# Patient Record
Sex: MALE | Race: WHITE | NOT HISPANIC OR LATINO | Employment: FULL TIME | URBAN - NONMETROPOLITAN AREA
[De-identification: names, ages, dates, MRNs, and addresses within clinical notes are randomized per-mention and may not be internally consistent; named-entity substitution may affect disease eponyms.]

---

## 2017-12-05 ENCOUNTER — HOSPITAL ENCOUNTER (OUTPATIENT)
Facility: HOSPITAL | Age: 42
Setting detail: OBSERVATION
Discharge: HOME | End: 2017-12-07
Attending: EMERGENCY MEDICINE | Admitting: INTERNAL MEDICINE
Payer: COMMERCIAL

## 2017-12-05 ENCOUNTER — APPOINTMENT (OUTPATIENT)
Dept: CT IMAGING | Facility: HOSPITAL | Age: 42
End: 2017-12-05
Payer: COMMERCIAL

## 2017-12-05 ENCOUNTER — APPOINTMENT (EMERGENCY)
Dept: RADIOLOGY | Facility: HOSPITAL | Age: 42
End: 2017-12-05
Payer: COMMERCIAL

## 2017-12-05 ENCOUNTER — APPOINTMENT (EMERGENCY)
Dept: CT IMAGING | Facility: HOSPITAL | Age: 42
End: 2017-12-05
Payer: COMMERCIAL

## 2017-12-05 DIAGNOSIS — R09.02 HYPOXIA: ICD-10-CM

## 2017-12-05 DIAGNOSIS — R41.0 CONFUSION: ICD-10-CM

## 2017-12-05 DIAGNOSIS — R09.02 HYPOXEMIA: ICD-10-CM

## 2017-12-05 DIAGNOSIS — G93.40 ACUTE ENCEPHALOPATHY: ICD-10-CM

## 2017-12-05 DIAGNOSIS — R41.82 ALTERED MENTAL STATUS: Primary | ICD-10-CM

## 2017-12-05 DIAGNOSIS — R41.0 DISORIENTATION: ICD-10-CM

## 2017-12-05 PROBLEM — G89.29 CHRONIC BACK PAIN: Status: ACTIVE | Noted: 2017-12-05

## 2017-12-05 PROBLEM — Z72.0 TOBACCO ABUSE: Status: ACTIVE | Noted: 2017-12-05

## 2017-12-05 PROBLEM — F11.20 OPIATE DEPENDENCE, CONTINUOUS (HCC): Status: ACTIVE | Noted: 2017-12-05

## 2017-12-05 PROBLEM — M54.9 CHRONIC BACK PAIN: Status: ACTIVE | Noted: 2017-12-05

## 2017-12-05 PROBLEM — I10 ESSENTIAL HYPERTENSION: Status: ACTIVE | Noted: 2017-12-05

## 2017-12-05 PROBLEM — R47.01 EXPRESSIVE APHASIA: Status: ACTIVE | Noted: 2017-12-05

## 2017-12-05 LAB
ALBUMIN SERPL BCP-MCNC: 3.6 G/DL (ref 3.5–5)
ALP SERPL-CCNC: 39 U/L (ref 46–116)
ALT SERPL W P-5'-P-CCNC: 29 U/L (ref 12–78)
AMMONIA PLAS-SCNC: 12 UMOL/L (ref 11–35)
AMPHETAMINES SERPL QL SCN: NEGATIVE
ANION GAP SERPL CALCULATED.3IONS-SCNC: 6 MMOL/L (ref 4–13)
APAP SERPL-MCNC: <2 UG/ML (ref 10–30)
AST SERPL W P-5'-P-CCNC: 14 U/L (ref 5–45)
BARBITURATES UR QL: NEGATIVE
BASE EX.OXY STD BLDV CALC-SCNC: 90.4 % (ref 60–80)
BASE EXCESS BLDV CALC-SCNC: 2.9 MMOL/L
BASOPHILS # BLD AUTO: 0.02 THOUSANDS/ΜL (ref 0–0.1)
BASOPHILS NFR BLD AUTO: 0 % (ref 0–1)
BENZODIAZ UR QL: NEGATIVE
BILIRUB SERPL-MCNC: 0.5 MG/DL (ref 0.2–1)
BILIRUB UR QL STRIP: NEGATIVE
BUN SERPL-MCNC: 17 MG/DL (ref 5–25)
CALCIUM SERPL-MCNC: 8.5 MG/DL (ref 8.3–10.1)
CHLORIDE SERPL-SCNC: 102 MMOL/L (ref 100–108)
CLARITY UR: CLEAR
CO2 SERPL-SCNC: 33 MMOL/L (ref 21–32)
COCAINE UR QL: NEGATIVE
COLOR UR: YELLOW
CREAT SERPL-MCNC: 0.98 MG/DL (ref 0.6–1.3)
EOSINOPHIL # BLD AUTO: 0.12 THOUSAND/ΜL (ref 0–0.61)
EOSINOPHIL NFR BLD AUTO: 2 % (ref 0–6)
ERYTHROCYTE [DISTWIDTH] IN BLOOD BY AUTOMATED COUNT: 13.2 % (ref 11.6–15.1)
ETHANOL SERPL-MCNC: <3 MG/DL (ref 0–3)
GFR SERPL CREATININE-BSD FRML MDRD: 94 ML/MIN/1.73SQ M
GLUCOSE SERPL-MCNC: 108 MG/DL (ref 65–140)
GLUCOSE SERPL-MCNC: 90 MG/DL (ref 65–140)
GLUCOSE UR STRIP-MCNC: NEGATIVE MG/DL
HCO3 BLDV-SCNC: 28.6 MMOL/L (ref 24–30)
HCT VFR BLD AUTO: 41.4 % (ref 36.5–49.3)
HGB BLD-MCNC: 13.7 G/DL (ref 12–17)
HGB UR QL STRIP.AUTO: NEGATIVE
INR PPP: 0.91 (ref 0.86–1.16)
KETONES UR STRIP-MCNC: ABNORMAL MG/DL
LACTATE SERPL-SCNC: 1.1 MMOL/L (ref 0.5–2)
LEUKOCYTE ESTERASE UR QL STRIP: NEGATIVE
LYMPHOCYTES # BLD AUTO: 2.57 THOUSANDS/ΜL (ref 0.6–4.47)
LYMPHOCYTES NFR BLD AUTO: 45 % (ref 14–44)
MAGNESIUM SERPL-MCNC: 2 MG/DL (ref 1.6–2.6)
MCH RBC QN AUTO: 29.8 PG (ref 26.8–34.3)
MCHC RBC AUTO-ENTMCNC: 33.1 G/DL (ref 31.4–37.4)
MCV RBC AUTO: 90 FL (ref 82–98)
METHADONE UR QL: NEGATIVE
MONOCYTES # BLD AUTO: 0.38 THOUSAND/ΜL (ref 0.17–1.22)
MONOCYTES NFR BLD AUTO: 7 % (ref 4–12)
NEUTROPHILS # BLD AUTO: 2.63 THOUSANDS/ΜL (ref 1.85–7.62)
NEUTS SEG NFR BLD AUTO: 46 % (ref 43–75)
NITRITE UR QL STRIP: NEGATIVE
O2 CT BLDV-SCNC: 17.7 ML/DL
OPIATES UR QL SCN: NEGATIVE
PCO2 BLDV: 48.1 MM HG (ref 42–50)
PCP UR QL: NEGATIVE
PH BLDV: 7.39 [PH] (ref 7.3–7.4)
PH UR STRIP.AUTO: 6 [PH] (ref 4.5–8)
PLATELET # BLD AUTO: 218 THOUSANDS/UL (ref 149–390)
PMV BLD AUTO: 9.4 FL (ref 8.9–12.7)
PO2 BLDV: 85.6 MM HG (ref 35–45)
POTASSIUM SERPL-SCNC: 3.8 MMOL/L (ref 3.5–5.3)
PROT SERPL-MCNC: 7 G/DL (ref 6.4–8.2)
PROT UR STRIP-MCNC: NEGATIVE MG/DL
PROTHROMBIN TIME: 12.2 SECONDS (ref 12.1–14.4)
RBC # BLD AUTO: 4.6 MILLION/UL (ref 3.88–5.62)
SALICYLATES SERPL-MCNC: 3 MG/DL (ref 3–20)
SODIUM SERPL-SCNC: 141 MMOL/L (ref 136–145)
SP GR UR STRIP.AUTO: 1.01 (ref 1–1.03)
THC UR QL: NEGATIVE
TROPONIN I SERPL-MCNC: <0.02 NG/ML
TSH SERPL DL<=0.05 MIU/L-ACNC: 2.05 UIU/ML (ref 0.36–3.74)
UROBILINOGEN UR QL STRIP.AUTO: 0.2 E.U./DL
WBC # BLD AUTO: 5.72 THOUSAND/UL (ref 4.31–10.16)

## 2017-12-05 PROCEDURE — 84443 ASSAY THYROID STIM HORMONE: CPT | Performed by: EMERGENCY MEDICINE

## 2017-12-05 PROCEDURE — 83735 ASSAY OF MAGNESIUM: CPT | Performed by: EMERGENCY MEDICINE

## 2017-12-05 PROCEDURE — 87086 URINE CULTURE/COLONY COUNT: CPT | Performed by: INTERNAL MEDICINE

## 2017-12-05 PROCEDURE — 36415 COLL VENOUS BLD VENIPUNCTURE: CPT | Performed by: EMERGENCY MEDICINE

## 2017-12-05 PROCEDURE — 86618 LYME DISEASE ANTIBODY: CPT | Performed by: EMERGENCY MEDICINE

## 2017-12-05 PROCEDURE — 80320 DRUG SCREEN QUANTALCOHOLS: CPT | Performed by: EMERGENCY MEDICINE

## 2017-12-05 PROCEDURE — 71275 CT ANGIOGRAPHY CHEST: CPT

## 2017-12-05 PROCEDURE — 82805 BLOOD GASES W/O2 SATURATION: CPT | Performed by: EMERGENCY MEDICINE

## 2017-12-05 PROCEDURE — G0480 DRUG TEST DEF 1-7 CLASSES: HCPCS | Performed by: EMERGENCY MEDICINE

## 2017-12-05 PROCEDURE — 81003 URINALYSIS AUTO W/O SCOPE: CPT | Performed by: EMERGENCY MEDICINE

## 2017-12-05 PROCEDURE — 70450 CT HEAD/BRAIN W/O DYE: CPT

## 2017-12-05 PROCEDURE — 71020 HB CHEST X-RAY 2VW FRONTAL&LATL: CPT

## 2017-12-05 PROCEDURE — 83605 ASSAY OF LACTIC ACID: CPT | Performed by: EMERGENCY MEDICINE

## 2017-12-05 PROCEDURE — 80307 DRUG TEST PRSMV CHEM ANLYZR: CPT | Performed by: EMERGENCY MEDICINE

## 2017-12-05 PROCEDURE — 93005 ELECTROCARDIOGRAM TRACING: CPT | Performed by: EMERGENCY MEDICINE

## 2017-12-05 PROCEDURE — 80053 COMPREHEN METABOLIC PANEL: CPT | Performed by: EMERGENCY MEDICINE

## 2017-12-05 PROCEDURE — 99285 EMERGENCY DEPT VISIT HI MDM: CPT

## 2017-12-05 PROCEDURE — 85025 COMPLETE CBC W/AUTO DIFF WBC: CPT | Performed by: EMERGENCY MEDICINE

## 2017-12-05 PROCEDURE — 84439 ASSAY OF FREE THYROXINE: CPT | Performed by: EMERGENCY MEDICINE

## 2017-12-05 PROCEDURE — 84484 ASSAY OF TROPONIN QUANT: CPT | Performed by: EMERGENCY MEDICINE

## 2017-12-05 PROCEDURE — 80329 ANALGESICS NON-OPIOID 1 OR 2: CPT | Performed by: EMERGENCY MEDICINE

## 2017-12-05 PROCEDURE — 85610 PROTHROMBIN TIME: CPT | Performed by: EMERGENCY MEDICINE

## 2017-12-05 PROCEDURE — 74177 CT ABD & PELVIS W/CONTRAST: CPT

## 2017-12-05 PROCEDURE — 82140 ASSAY OF AMMONIA: CPT | Performed by: EMERGENCY MEDICINE

## 2017-12-05 PROCEDURE — 82948 REAGENT STRIP/BLOOD GLUCOSE: CPT

## 2017-12-05 RX ORDER — NICOTINE 21 MG/24HR
1 PATCH, TRANSDERMAL 24 HOURS TRANSDERMAL DAILY
Status: DISCONTINUED | OUTPATIENT
Start: 2017-12-06 | End: 2017-12-07 | Stop reason: HOSPADM

## 2017-12-05 RX ORDER — ALPRAZOLAM 0.5 MG/1
TABLET ORAL
COMMUNITY
End: 2019-11-27 | Stop reason: HOSPADM

## 2017-12-05 RX ORDER — BISOPROLOL FUMARATE 5 MG/1
5 TABLET ORAL DAILY
Status: DISCONTINUED | OUTPATIENT
Start: 2017-12-06 | End: 2017-12-07 | Stop reason: HOSPADM

## 2017-12-05 RX ORDER — ACETAMINOPHEN 325 MG/1
650 TABLET ORAL EVERY 6 HOURS PRN
Status: DISCONTINUED | OUTPATIENT
Start: 2017-12-05 | End: 2017-12-07 | Stop reason: HOSPADM

## 2017-12-05 RX ORDER — ASPIRIN 81 MG/1
81 TABLET, CHEWABLE ORAL DAILY
Status: DISCONTINUED | OUTPATIENT
Start: 2017-12-05 | End: 2017-12-07 | Stop reason: HOSPADM

## 2017-12-05 RX ORDER — ONDANSETRON 2 MG/ML
4 INJECTION INTRAMUSCULAR; INTRAVENOUS EVERY 6 HOURS PRN
Status: DISCONTINUED | OUTPATIENT
Start: 2017-12-05 | End: 2017-12-07 | Stop reason: HOSPADM

## 2017-12-05 RX ORDER — SODIUM CHLORIDE 9 MG/ML
125 INJECTION, SOLUTION INTRAVENOUS CONTINUOUS
Status: DISCONTINUED | OUTPATIENT
Start: 2017-12-05 | End: 2017-12-06

## 2017-12-05 RX ORDER — BISOPROLOL FUMARATE 5 MG/1
5 TABLET ORAL DAILY
Status: ON HOLD | COMMUNITY
End: 2021-01-19 | Stop reason: SDUPTHER

## 2017-12-05 RX ORDER — OXYCODONE HYDROCHLORIDE 15 MG/1
10 TABLET, FILM COATED, EXTENDED RELEASE ORAL EVERY 12 HOURS SCHEDULED
COMMUNITY
End: 2019-11-27 | Stop reason: HOSPADM

## 2017-12-05 RX ADMIN — SODIUM CHLORIDE 125 ML/HR: 0.9 INJECTION, SOLUTION INTRAVENOUS at 23:37

## 2017-12-05 RX ADMIN — IOHEXOL 100 ML: 350 INJECTION, SOLUTION INTRAVENOUS at 23:08

## 2017-12-05 RX ADMIN — ASPIRIN 81 MG CHEWABLE TABLET 81 MG: 81 TABLET CHEWABLE at 23:29

## 2017-12-05 RX ADMIN — SODIUM CHLORIDE 1000 ML: 0.9 INJECTION, SOLUTION INTRAVENOUS at 22:03

## 2017-12-06 ENCOUNTER — APPOINTMENT (OUTPATIENT)
Dept: NON INVASIVE DIAGNOSTICS | Facility: HOSPITAL | Age: 42
End: 2017-12-06
Payer: COMMERCIAL

## 2017-12-06 ENCOUNTER — APPOINTMENT (OUTPATIENT)
Dept: MRI IMAGING | Facility: HOSPITAL | Age: 42
End: 2017-12-06
Payer: COMMERCIAL

## 2017-12-06 ENCOUNTER — APPOINTMENT (OUTPATIENT)
Dept: ULTRASOUND IMAGING | Facility: HOSPITAL | Age: 42
End: 2017-12-06
Payer: COMMERCIAL

## 2017-12-06 PROBLEM — E83.39 HYPERPHOSPHATEMIA: Status: ACTIVE | Noted: 2017-12-06

## 2017-12-06 LAB
25(OH)D3 SERPL-MCNC: 15 NG/ML (ref 30–100)
ALBUMIN SERPL BCP-MCNC: 3 G/DL (ref 3.5–5)
ALP SERPL-CCNC: 32 U/L (ref 46–116)
ALT SERPL W P-5'-P-CCNC: 25 U/L (ref 12–78)
AMPHETAMINES SERPL QL SCN: NEGATIVE
ANION GAP SERPL CALCULATED.3IONS-SCNC: 7 MMOL/L (ref 4–13)
ARTERIAL PATENCY WRIST A: YES
ARTERIAL PATENCY WRIST A: YES
AST SERPL W P-5'-P-CCNC: 12 U/L (ref 5–45)
BARBITURATES UR QL: NEGATIVE
BASE EXCESS BLDA CALC-SCNC: 0.8 MMOL/L
BASE EXCESS BLDA CALC-SCNC: 1.8 MMOL/L
BASOPHILS # BLD AUTO: 0.03 THOUSANDS/ΜL (ref 0–0.1)
BASOPHILS NFR BLD AUTO: 0 % (ref 0–1)
BENZODIAZ UR QL: NEGATIVE
BILIRUB SERPL-MCNC: 0.4 MG/DL (ref 0.2–1)
BUN SERPL-MCNC: 12 MG/DL (ref 5–25)
CALCIUM SERPL-MCNC: 8.2 MG/DL (ref 8.3–10.1)
CHLORIDE SERPL-SCNC: 104 MMOL/L (ref 100–108)
CHOLEST SERPL-MCNC: 163 MG/DL (ref 50–200)
CK SERPL-CCNC: 79 U/L (ref 39–308)
CO2 SERPL-SCNC: 29 MMOL/L (ref 21–32)
COCAINE UR QL: NEGATIVE
CREAT SERPL-MCNC: 0.81 MG/DL (ref 0.6–1.3)
EOSINOPHIL # BLD AUTO: 0.11 THOUSAND/ΜL (ref 0–0.61)
EOSINOPHIL NFR BLD AUTO: 2 % (ref 0–6)
ERYTHROCYTE [DISTWIDTH] IN BLOOD BY AUTOMATED COUNT: 13.3 % (ref 11.6–15.1)
EST. AVERAGE GLUCOSE BLD GHB EST-MCNC: 111 MG/DL
GAS + CO PNL BLDA: 4.1 % (ref 0–1.5)
GFR SERPL CREATININE-BSD FRML MDRD: 109 ML/MIN/1.73SQ M
GLUCOSE SERPL-MCNC: 90 MG/DL (ref 65–140)
HAV IGM SER QL: NORMAL
HBA1C MFR BLD: 5.5 % (ref 4.2–6.3)
HBV CORE IGM SER QL: NORMAL
HBV SURFACE AG SER QL: NORMAL
HCO3 BLDA-SCNC: 27.3 MMOL/L (ref 22–28)
HCO3 BLDA-SCNC: 27.6 MMOL/L (ref 22–28)
HCT VFR BLD AUTO: 38.9 % (ref 36.5–49.3)
HCV AB SER QL: NORMAL
HDLC SERPL-MCNC: 41 MG/DL (ref 40–60)
HGB BLD-MCNC: 12.6 G/DL (ref 12–17)
LACTATE SERPL-SCNC: 0.9 MMOL/L (ref 0.5–2)
LDLC SERPL CALC-MCNC: 107 MG/DL (ref 0–100)
LYMPHOCYTES # BLD AUTO: 2.16 THOUSANDS/ΜL (ref 0.6–4.47)
LYMPHOCYTES NFR BLD AUTO: 32 % (ref 14–44)
MAGNESIUM SERPL-MCNC: 1.8 MG/DL (ref 1.6–2.6)
MCH RBC QN AUTO: 29.4 PG (ref 26.8–34.3)
MCHC RBC AUTO-ENTMCNC: 32.4 G/DL (ref 31.4–37.4)
MCV RBC AUTO: 91 FL (ref 82–98)
METHADONE UR QL: NEGATIVE
MONOCYTES # BLD AUTO: 0.4 THOUSAND/ΜL (ref 0.17–1.22)
MONOCYTES NFR BLD AUTO: 6 % (ref 4–12)
NASAL CANNULA: 2
NASAL CANNULA: 2
NEUTROPHILS # BLD AUTO: 4.02 THOUSANDS/ΜL (ref 1.85–7.62)
NEUTS SEG NFR BLD AUTO: 60 % (ref 43–75)
O2 CT BLDA-SCNC: 17.5 ML/DL (ref 16–23)
O2 CT BLDA-SCNC: 18.5 ML/DL (ref 16–23)
OPIATES UR QL SCN: NEGATIVE
OXYHGB MFR BLDA: 91.9 % (ref 94–97)
OXYHGB MFR BLDA: 94.9 % (ref 94–97)
PCO2 BLDA: 46.5 MM HG (ref 36–44)
PCO2 BLDA: 53.3 MM HG (ref 36–44)
PCP UR QL: NEGATIVE
PH BLDA: 7.33 [PH] (ref 7.35–7.45)
PH BLDA: 7.39 [PH] (ref 7.35–7.45)
PHOSPHATE SERPL-MCNC: 4.7 MG/DL (ref 2.7–4.5)
PLATELET # BLD AUTO: 214 THOUSANDS/UL (ref 149–390)
PMV BLD AUTO: 9.6 FL (ref 8.9–12.7)
PO2 BLDA: 90.8 MM HG (ref 75–129)
PO2 BLDA: 98.5 MM HG (ref 75–129)
POTASSIUM SERPL-SCNC: 4.3 MMOL/L (ref 3.5–5.3)
PROT SERPL-MCNC: 6 G/DL (ref 6.4–8.2)
RBC # BLD AUTO: 4.28 MILLION/UL (ref 3.88–5.62)
SODIUM SERPL-SCNC: 140 MMOL/L (ref 136–145)
SPECIMEN SOURCE: ABNORMAL
SPECIMEN SOURCE: ABNORMAL
T4 FREE SERPL-MCNC: 1.07 NG/DL (ref 0.76–1.46)
THC UR QL: NEGATIVE
TRIGL SERPL-MCNC: 74 MG/DL
TROPONIN I SERPL-MCNC: <0.02 NG/ML
TROPONIN I SERPL-MCNC: <0.02 NG/ML
VIT B12 SERPL-MCNC: 305 PG/ML (ref 100–900)
WBC # BLD AUTO: 6.72 THOUSAND/UL (ref 4.31–10.16)

## 2017-12-06 PROCEDURE — 94664 DEMO&/EVAL PT USE INHALER: CPT

## 2017-12-06 PROCEDURE — 93880 EXTRACRANIAL BILAT STUDY: CPT

## 2017-12-06 PROCEDURE — 84100 ASSAY OF PHOSPHORUS: CPT | Performed by: INTERNAL MEDICINE

## 2017-12-06 PROCEDURE — 97167 OT EVAL HIGH COMPLEX 60 MIN: CPT

## 2017-12-06 PROCEDURE — 70553 MRI BRAIN STEM W/O & W/DYE: CPT

## 2017-12-06 PROCEDURE — 86592 SYPHILIS TEST NON-TREP QUAL: CPT | Performed by: INTERNAL MEDICINE

## 2017-12-06 PROCEDURE — 80074 ACUTE HEPATITIS PANEL: CPT | Performed by: INTERNAL MEDICINE

## 2017-12-06 PROCEDURE — A9585 GADOBUTROL INJECTION: HCPCS | Performed by: INTERNAL MEDICINE

## 2017-12-06 PROCEDURE — 80307 DRUG TEST PRSMV CHEM ANLYZR: CPT | Performed by: INTERNAL MEDICINE

## 2017-12-06 PROCEDURE — 80061 LIPID PANEL: CPT | Performed by: INTERNAL MEDICINE

## 2017-12-06 PROCEDURE — G8978 MOBILITY CURRENT STATUS: HCPCS | Performed by: PHYSICAL THERAPIST

## 2017-12-06 PROCEDURE — 70544 MR ANGIOGRAPHY HEAD W/O DYE: CPT

## 2017-12-06 PROCEDURE — 82306 VITAMIN D 25 HYDROXY: CPT | Performed by: INTERNAL MEDICINE

## 2017-12-06 PROCEDURE — 82805 BLOOD GASES W/O2 SATURATION: CPT | Performed by: INTERNAL MEDICINE

## 2017-12-06 PROCEDURE — 80053 COMPREHEN METABOLIC PANEL: CPT | Performed by: INTERNAL MEDICINE

## 2017-12-06 PROCEDURE — 82607 VITAMIN B-12: CPT | Performed by: INTERNAL MEDICINE

## 2017-12-06 PROCEDURE — 84484 ASSAY OF TROPONIN QUANT: CPT | Performed by: INTERNAL MEDICINE

## 2017-12-06 PROCEDURE — 85025 COMPLETE CBC W/AUTO DIFF WBC: CPT | Performed by: INTERNAL MEDICINE

## 2017-12-06 PROCEDURE — G8987 SELF CARE CURRENT STATUS: HCPCS

## 2017-12-06 PROCEDURE — 83036 HEMOGLOBIN GLYCOSYLATED A1C: CPT | Performed by: INTERNAL MEDICINE

## 2017-12-06 PROCEDURE — 82550 ASSAY OF CK (CPK): CPT | Performed by: INTERNAL MEDICINE

## 2017-12-06 PROCEDURE — 83735 ASSAY OF MAGNESIUM: CPT | Performed by: INTERNAL MEDICINE

## 2017-12-06 PROCEDURE — 94760 N-INVAS EAR/PLS OXIMETRY 1: CPT

## 2017-12-06 PROCEDURE — 97116 GAIT TRAINING THERAPY: CPT | Performed by: PHYSICAL THERAPIST

## 2017-12-06 PROCEDURE — 82375 ASSAY CARBOXYHB QUANT: CPT | Performed by: INTERNAL MEDICINE

## 2017-12-06 PROCEDURE — G8989 SELF CARE D/C STATUS: HCPCS

## 2017-12-06 PROCEDURE — 83605 ASSAY OF LACTIC ACID: CPT | Performed by: INTERNAL MEDICINE

## 2017-12-06 PROCEDURE — 93306 TTE W/DOPPLER COMPLETE: CPT

## 2017-12-06 PROCEDURE — G8988 SELF CARE GOAL STATUS: HCPCS

## 2017-12-06 PROCEDURE — 94762 N-INVAS EAR/PLS OXIMTRY CONT: CPT

## 2017-12-06 PROCEDURE — 97163 PT EVAL HIGH COMPLEX 45 MIN: CPT | Performed by: PHYSICAL THERAPIST

## 2017-12-06 PROCEDURE — G8979 MOBILITY GOAL STATUS: HCPCS | Performed by: PHYSICAL THERAPIST

## 2017-12-06 PROCEDURE — 87633 RESP VIRUS 12-25 TARGETS: CPT | Performed by: INTERNAL MEDICINE

## 2017-12-06 PROCEDURE — 36600 WITHDRAWAL OF ARTERIAL BLOOD: CPT

## 2017-12-06 PROCEDURE — 87389 HIV-1 AG W/HIV-1&-2 AB AG IA: CPT | Performed by: INTERNAL MEDICINE

## 2017-12-06 PROCEDURE — 97530 THERAPEUTIC ACTIVITIES: CPT

## 2017-12-06 RX ORDER — OXYCODONE HYDROCHLORIDE 5 MG/1
5 TABLET ORAL EVERY 6 HOURS PRN
Status: DISCONTINUED | OUTPATIENT
Start: 2017-12-06 | End: 2017-12-07 | Stop reason: HOSPADM

## 2017-12-06 RX ADMIN — OXYCODONE HYDROCHLORIDE 5 MG: 5 TABLET ORAL at 23:03

## 2017-12-06 RX ADMIN — ASPIRIN 81 MG CHEWABLE TABLET 81 MG: 81 TABLET CHEWABLE at 09:06

## 2017-12-06 RX ADMIN — ENOXAPARIN SODIUM 40 MG: 40 INJECTION SUBCUTANEOUS at 00:17

## 2017-12-06 RX ADMIN — NICOTINE 1 PATCH: 14 PATCH, EXTENDED RELEASE TRANSDERMAL at 09:06

## 2017-12-06 RX ADMIN — GADOBUTROL 9 ML: 604.72 INJECTION INTRAVENOUS at 19:52

## 2017-12-06 RX ADMIN — SODIUM CHLORIDE 125 ML/HR: 0.9 INJECTION, SOLUTION INTRAVENOUS at 09:08

## 2017-12-06 RX ADMIN — OXYCODONE HYDROCHLORIDE 5 MG: 5 TABLET ORAL at 15:43

## 2017-12-06 NOTE — RESPIRATORY THERAPY NOTE
RT Protocol Note  Donna Matthews 36 y o  male MRN: [id number]  Unit/Bed#: 021-21 Encounter: [id number]    Assessment    Principal Problem:    Acute encephalopathy  Active Problems:    Hypoxia    Tobacco abuse    Opiate dependence, continuous (HCC)    Essential hypertension    Expressive aphasia    Chronic back pain      Home Pulmonary Medications:  No home respiratory medicines    Past Medical History:   Diagnosis Date    Chronic pain     back    Hypertension      Social History     Social History    Marital status: /Civil Union     Spouse name: N/A    Number of children: N/A    Years of education: N/A     Social History Main Topics    Smoking status: Current Every Day Smoker    Smokeless tobacco: Never Used    Alcohol use No    Drug use: No    Sexual activity: Not Asked     Other Topics Concern    None     Social History Narrative    None       Subjective  No sob, patient stated his back hurts, but not from coughing, more from disc       Objective  If patient becomes wheezy, start bronchodialatory therapy  Physical Exam:   Assessment Type: Assess only  General Appearance: Alert, Awake, Eyes open/responds to voice, Drowsy  Respiratory Pattern: Normal  Chest Assessment: Chest expansion symmetrical  Bilateral Breath Sounds: Clear  Cough: Non-productive, Dry    Vitals:  Blood pressure 138/81, pulse 83, temperature 98 8 °F (37 1 °C), temperature source Temporal, resp  rate 18, height 5' 11" (1 803 m), weight 90 7 kg (199 lb 15 3 oz), SpO2 96 %  Results from last 7 days  Lab Units 12/06/17  0012   PH ART  7 332*   PCO2 ART mm Hg 53 3*   PO2 ART mm Hg 90 8   HCO3 ART mmol/L 27 6   BASE EXC ART mmol/L 0 8   O2 CONTENT ART mL/dL 17 5   O2 HGB, ARTERIAL % 91 9*   ABG SOURCE  Radial, Left   EARLENE TEST  Yes       Imaging and other studies: I have personally reviewed pertinent reports              Plan    Respiratory Plan: No distress/Pulmonary history

## 2017-12-06 NOTE — ED NOTES
When asking patient orientation questions, patient was orientated to date when first asked  Patient then stated it was January  When asked again in a few minutes, patient stated it was December        Moi Duffy RN  12/05/17 3883

## 2017-12-06 NOTE — SPEECH THERAPY NOTE
Consult received  Patient deferred evaluation; feeling his speech and language is [facility]  Patient was screened and without expressive or receptive language deficits  Dysphagia screen also completed and patient is [facility] on regular diet and thin liquids

## 2017-12-06 NOTE — PROGRESS NOTES
[facility] Progress Note  Patient: Leonel Montero 36 y o  male   MRN: [id number]  PCP: Gregory Pat DO  Unit/Bed#: 131-56 Encounter: [id number]  Date Of Visit: 17    Assessment:    Principal Problem:    Acute encephalopathy  Active Problems:    Hypoxia    Tobacco abuse    Opiate dependence, continuous (HCC)    Essential hypertension    Expressive aphasia    Chronic back pain    Hyperphosphatemia      Plan:    · Acute encephalopathy, present on admission, possible transient global amnesia, possible CVA, MRI MRA of the brain    Case discussed with Neurology, will follow up with PT OT evaluation, check carotid ultrasound    Patient had some hypercapnia present on admission, this has resolved, will assess ambulatory pulse ox later today, no clear etiology  VTE Pharmacologic Prophylaxis:   Pharmacologic: Enoxaparin (Lovenox)  Mechanical VTE Prophylaxis in Place: Yes    Current Length of Stay: 0 day(s)    Current Patient Status: Observation     Code Status: Level 1 - Full Code      Subjective:   No pain, patient has very poor recollection of the events that led to this hospital stay  Patient states that he was feeling well prior to getting admitted    Objective:     Vitals:   Temp (24hrs), Av 9 °F (36 6 °C), Min:96 7 °F (35 9 °C), Max:99 2 °F (37 3 °C)    HR:  [] 63  Resp:  [13-18] 18  BP: ()/(55-87) 107/57  SpO2:  [91 %-98 %] 95 %  Body mass index is 27 89 kg/m²  Input and Output Summary (last 24 hours): Intake/Output Summary (Last 24 hours) at 17 7600  Last data filed at 17 0908   Gross per 24 hour   Intake          1189 58 ml   Output              950 ml   Net           239 58 ml       Physical Exam:     Physical Exam   Constitutional: He appears well-developed and well-nourished  No distress  Pulmonary/Chest: Effort normal and breath sounds normal  No respiratory distress  He has no wheezes  Neurological: He displays normal reflexes   No cranial nerve deficit  He exhibits normal muscle tone  Coordination normal    Nystagmus  noted by Neurology team   Skin: He is not diaphoretic  Psychiatric:   Patient with flat affect, seems depressed, very slow to answer some questions, patient has very poor recollection of coming to the hospital and the events of yesterday evening   Nursing note and vitals reviewed  Additional Data:     Labs:      Results from last 7 days  Lab Units 12/06/17  0501   WBC Thousand/uL 6 72   HEMOGLOBIN g/dL 12 6   HEMATOCRIT % 38 9   PLATELETS Thousands/uL 214   NEUTROS PCT % 60   LYMPHS PCT % 32   MONOS PCT % 6   EOS PCT % 2       Results from last 7 days  Lab Units 12/06/17  0501   SODIUM mmol/L 140   POTASSIUM mmol/L 4 3   CHLORIDE mmol/L 104   CO2 mmol/L 29   BUN mg/dL 12   CREATININE mg/dL 0 81   CALCIUM mg/dL 8 2*   TOTAL PROTEIN g/dL 6 0*   BILIRUBIN TOTAL mg/dL 0 40   ALK PHOS U/L 32*   ALT U/L 25   AST U/L 12   GLUCOSE RANDOM mg/dL 90       Results from last 7 days  Lab Units 12/05/17  2109   INR  0 91       * I Have Reviewed All Lab Data Listed Above  * Additional Pertinent Lab Tests Reviewed: [facility] Admission Reviewed    CT scan chest abdomen pelvis, negative for any acute pathology, CT of the brain unremarkable        Recent Cultures (last 7 days):           Last 24 Hours Medication List:     aspirin 81 mg Oral Daily   bisoprolol 5 mg Oral Daily   enoxaparin 40 mg Subcutaneous Q24H   nicotine 1 patch Transdermal Daily        Today, Patient Was Seen By: Lori Aden DO

## 2017-12-06 NOTE — H&P
H&P Exam - Glenna Mckeon 36 y o  male MRN: [id number]    Unit/Bed#: DIPESH Encounter: [id number]    Assessment:  Patient Active Problem List   Diagnosis    Hypoxia    Acute encephalopathy    Tobacco abuse    Opiate dependence, continuous ([facility])    Essential hypertension    Expressive aphasia    Chronic back pain       Plan:  1)  Acute encephalopathy/Expressive aphasia:  Place the patient in 23-hour observation, med/surg level of care with telemetry monitoring  Initiate the Stroke/TIA pathway  Aspirin 81 mg PO Qdaily  NSS IV fluids  In the setting of hypoxia, check an ABG  Initiate continuous pulse oximetry  Check a rapid urine drug screen  NSS IV fluids  Check a carboxyhemoglobin (carbon monoxide) level  Check a Lyme antibody profile  Check a RPR  Check an MRI of the brain with and without contrast   Check an MRA of the head without contrast   Check a carotid duplex bilaterally  Check an EEG  Consult Tele Neurology  Check a urinalysis and urine culture  His ammonia level was within normal limits  The patient may require transfer to [address] [place] [address] for EMU (24-hour EEG monitoring)  I will hold his Oxycontin as well as his Alprazolam, both of which could be contributing to his symptomatology  He may need an LP (lumbar puncture) if his symptoms persist   Speech/swallow evaluation  Consult PT and OT  The patient may have PRES (posterior reversible encephalopathy syndrome) with his chronic hypertension and in the setting of the patient being non-compliant with his anti-hypertensive medication  2)  Hypoxia:  I will order supplemental oxygen via a nasal cannula to maintain his oxygen saturation at 92% and above  Initiate continuous pulse oximetry and the respiratory protocol  Incentive spirometry 10 times per hour while awake  Check an ABG  Check a CTA of the chest/PE protocol    He will need formal pulmonary function tests completed as an outpatient and will need outpatient follow-up with Pulmonology  Smoking cessation counseling  Check the respiratory pathogen profile  3)  Continuous opiate dependence/Chronic back pain:  Hold the patient's Oxycontin, which may be contributing to his current symptomatology  He will need to be seen by Pain Management as an outpatient  4)  Essential hypertension:  Per the patient's wife, the patient has been noncompliant with his anti-hypertensive medication  Continue PO Bisoprolol  Follow his blood pressure trend closely  5)  Tobacco abuse:  Nicotine patch  Smoking cessation counseling  6)  DVT Prophylaxis:  Lovenox 40 mg SQ every 24 hours  SCD's bilaterally  History of Present Illness   The patient is a 36year-old male presented to the emergency department with the complaints of confusion and word-finding difficulties  The patient was in his usual state of health this morning  This afternoon, the patient's family noticed that the patient was acting confused  He was talking about people and events that did not make sense  He had difficulty finding what words he wanted to say  Over last several weeks, the patient's wife has noticed that he has been falling asleep frequently  He has been experiencing brief unresponsive episodes  He has also been experiencing episodes of weakness in his bilateral hands, in which he uncontrollably drops an object that he is grasping  He had similar symptoms approximately 2 years ago and had an extensive hospitalization and workup at [address] [place] [address]  No definitive diagnosis was found at that time  Currently, he is falling asleep frequent during my history and physical exam     Review of Systems:  Per HPI, all other systems have been reviewed and were negative      Historical Information   Past Medical History:   Diagnosis Date    Chronic pain     back    Hypertension      Past Surgical History:   Procedure Laterality Date    CARPAL TUNNEL RELEASE      ROTATOR CUFF REPAIR      ULNAR NERVE REPAIR       Social History   History   Alcohol Use No     History   Drug Use No     History   Smoking Status    Current Every Day Smoker   Smokeless Tobacco    Never Used     Family History:   Mother with Multiple Sclerosis    Meds/Allergies   all medications and allergies reviewed     No Known Allergies    Objective   First Vitals:   Blood Pressure: 125/72 (12/05/17 2050)  Pulse: 95 (12/05/17 2050)  Temperature: 99 2 °F (37 3 °C) (12/05/17 2050)  Temp Source: Temporal (12/05/17 2050)  Respirations: 17 (12/05/17 2050)  Weight - Scale: 90 4 kg (199 lb 3 2 oz) (12/05/17 2050)  SpO2: 94 % (12/05/17 2050)    Current Vitals:   Blood Pressure: 132/87 (12/05/17 2330)  Pulse: 94 (12/05/17 2330)  Temperature: 99 2 °F (37 3 °C) (12/05/17 2050)  Temp Source: Temporal (12/05/17 2050)  Respirations: 17 (12/05/17 2330)  Weight - Scale: 90 4 kg (199 lb 3 2 oz) (12/05/17 2050)  SpO2: 98 % (12/05/17 2330)    No intake or output data in the 24 hours ending 12/05/17 2346    Invasive Devices     Peripheral Intravenous Line            Peripheral IV 12/05/17 Right Antecubital less than 1 day                Physical Exam   General:  NAD, lethargic, follows commands, falling asleep during the physical exam, oriented x 3  HEENT:  NC/AT, mucous membranes moist  Neck:  Supple, No JVP elevation  CV:  + S1, + S2, RRR  Pulm:  Lung fields are CTA bilaterally  Abd:  Soft, Non-tender, Non-distended  Ext:  No clubbing/cyanosis/edema  Neuro:  No focal deficits      Lab Results:  Reviewed  Lab Results   Component Value Date    WBC 5 72 12/05/2017    HGB 13 7 12/05/2017    HCT 41 4 12/05/2017    MCV 90 12/05/2017     12/05/2017     Lab Results   Component Value Date    GLUCOSE 108 12/05/2017    CALCIUM 8 5 12/05/2017     12/05/2017    K 3 8 12/05/2017    CO2 33 (H) 12/05/2017     12/05/2017    BUN 17 12/05/2017    CREATININE 0 98 12/05/2017     Lab Results   Component Value Date ALT 29 12/05/2017    AST 14 12/05/2017    ALKPHOS 39 (L) 12/05/2017    BILITOT 0 50 12/05/2017       Imaging:  CT scan of the head without contrast (12/5/17):  CT BRAIN - WITHOUT CONTRAST     INDICATION:  Confusion and disorientation for 40 minutes  Change in speech      COMPARISON:  None      TECHNIQUE:  CT examination of the brain was performed  In addition to axial images, coronal reformatted images were created and submitted for interpretation        Radiation dose length product (DLP) for this visit:  968 09 mGy-cm   This examination, like all CT scans performed in the [facility], was performed utilizing techniques to minimize radiation dose exposure, including the use of iterative   reconstruction and automated exposure control        IMAGE QUALITY:  Diagnostic      FINDINGS:     PARENCHYMA:  No intracranial mass, mass effect or midline shift  No CT signs of acute infarction  There is no parenchymal hemorrhage           VENTRICLES AND EXTRA-AXIAL SPACES:  Normal for patient's age      VISUALIZED ORBITS AND PARANASAL SINUSES:  Unremarkable      CALVARIUM AND EXTRACRANIAL SOFT TISSUES:   Normal      IMPRESSION:     No acute intracranial abnormality         Chest xray PA and lateral (12/5/17):  CHEST 2 View     INDICATION:  Change in mental status      COMPARISON:  11/4/2015     VIEWS:  PA and lateral projections; 3 images     FINDINGS:          Cardiomediastinal silhouette appears stable      The lungs are clear  No pneumothorax or pleural effusion      Visualized osseous structures appear within normal limits for the patient's age      IMPRESSION:     No active pulmonary disease  EKG, Pathology, and Other Studies:  EKG (12/5/17): Normal sinus rhythm, Heart rate of 98 bpm    Code Status: Level 1-Full Code  Advance Directive and Living Will:      Power of :    POLST:      Counseling / Coordination of Care: Total floor / unit time spent today 45 minutes

## 2017-12-06 NOTE — ED PROVIDER NOTES
History  Chief Complaint   Patient presents with    Altered Mental Status     Pt's family reports he had sudden onset of confusion  Pt has some unsteady gait  Pt's family reports change in speech     This is a 36year old male with the noted past medical history who presents with an abrupt episode of confusion and abnormal mental status beginning at home approximately 40 minutes prior to arrival and witnessed by his son who was present at the onset of symptoms  In discussion with his son, he describes the patient as having had brief episode of generalized shaking that occurred with eyes closed followed by a period of abnormal speech that lasted several minutes; in particular, the patient was noted to state, "That girl in [place] got killed and I'm going to find the marcus that did it " 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  that were nonsensical   Patient's wife was then contacted and brought him to the emergency department  Patient otherwise had been in his normal state of health; he had had 3 mechanical falls occurring within the past month  The patient denied any difficulties with gait/instability/unsteadiness on his feet that occurred prior to those falls  He does have a previous history of similar symptoms occurring in 2015; he was seen in this emergency department initially and subsequently transferred to [facility] where he underwent extensive workup that included EEG/LP that were unremarkable  He additionally had an MRI at that time that was also unremarkable  He has not had any episodes exactly analogous to the current one but his wife notes intermittent episodes in which he will appear slightly confused or dazed lasting a few minutes at a time  Denies associated fever/chills/focal weakness/focal paresthesias/chest pain/headache/blurred vision/double vision/nausea/vomiting/abdominal pain/rash   Of note, the patient's father-in-law  just over one month prior and both the patient and his other family members were strongly affected by this  Hypoxia vs  Infection vs  TIA/Stroke vs  Metabolic/Toxic  Dementia an unlikely cause given rapidity of onset  Hypoxia:   ED O2 sat showed 92-95% RA  Unclear etiology for hypoxemia but not sufficiently low to produce AMS  Plan: Continuous O2 NC and pulse ox - maintain O2 sat > 95%   CXR ordered and wnl  Infection: 1 of 4 SIRS criteria: Lab Results       Component                Value               Date                       WBC                      5 72                2017                 WBC                      6 64                2015              UA w/ reflex culture ordered   Blood cultures x2 not ordered: low suspicion for infectious etiology  Physical exam findings not suggestive of meningitis   Season not appropriate to [facility]; however patient with previous hx Lyme disease 1 yr prior and completed full abx treatment course for this  Did find tick in home several weeks prior although was not embedded and had not taken blood meal   TIA/stroke   History of TIA/CVA: none  No focal findings on physical exam   No early signs of stroke on HCT; no hemorrhage or other acute findings  AMS started in TPA window but not candidate d/t presence of questionable seizure at onset of sx  Additionally, he has no focal findings on physical examination suggestive of acute CVA and my overall suspicion for acute CVA is extremely low  Brain MRI may be indicated: will need to be admitted regardless for further workup given complexity/diagnostic uncertainty of sx  EEG may be indicated while admitted  Metabolic:   Electrolytes ordered and wnl    TSH wnl; T4 wnl  New medicines: none  Recently stopped medicines: none  Tox screen pending          History provided by:  Patient and spouse  Altered Mental Status   Presenting symptoms: confusion    Associated symptoms: no fever, no headaches, no light-headedness, no nausea, no palpitations, no rash, no vomiting and no weakness        Prior to Admission Medications   Prescriptions Last Dose Informant Patient Reported? Taking? ALPRAZolam (XANAX) 0 5 mg tablet   Yes Yes   Sig: Take by mouth daily at bedtime as needed for anxiety   UNKNOWN TO PATIENT   Yes Yes   bisoprolol (ZEBETA) 5 mg tablet   Yes Yes   Sig: Take 5 mg by mouth daily   oxyCODONE (OxyCONTIN) 15 mg 12 hr tablet   Yes Yes   Sig: Take 10 mg by mouth every 12 (twelve) hours      Facility-Administered Medications: None       Past Medical History:   Diagnosis Date    Chronic pain     back    Hypertension        Past Surgical History:   Procedure Laterality Date    CARPAL TUNNEL RELEASE      ROTATOR CUFF REPAIR      ULNAR NERVE REPAIR         History reviewed  No pertinent family history  I have reviewed and agree with the history as documented  Social History   Substance Use Topics    Smoking status: Current Every Day Smoker    Smokeless tobacco: Never Used    Alcohol use No        Review of Systems   Constitutional: Negative for chills and fever  Eyes: Negative for photophobia, pain and visual disturbance  Respiratory: Negative for cough and shortness of breath  Cardiovascular: Negative for chest pain and palpitations  Gastrointestinal: Negative for nausea and vomiting  Skin: Negative for color change, pallor, rash and wound  Neurological: Negative for dizziness, facial asymmetry, weakness, light-headedness, numbness and headaches  Hematological: Negative for adenopathy  Does not bruise/bleed easily  Psychiatric/Behavioral: Positive for confusion and decreased concentration  The patient is not nervous/anxious  All other systems reviewed and are negative        Physical Exam  ED Triage Vitals   Temperature Pulse Respirations Blood Pressure SpO2   12/05/17 2050 12/05/17 2050 12/05/17 2050 12/05/17 2050 12/05/17 2050   99 2 °F (37 3 °C) 95 17 125/72 94 %      Temp Source Heart Rate Source Patient Position - Orthostatic VS BP Location FiO2 (%)   12/05/17 2050 12/05/17 2050 12/05/17 2050 12/05/17 2050 --   Temporal Monitor Sitting Left arm       Pain Score       12/05/17 2315       5           Orthostatic Vital Signs  Vitals:    12/05/17 2130 12/05/17 2200 12/05/17 2245 12/05/17 2315   BP: 127/73 119/76 121/78 130/85   Pulse: 100 91 90 93   Patient Position - Orthostatic VS:   Sitting Sitting       Physical Exam   Constitutional: He appears well-developed and well-nourished  He is cooperative  No distress  HENT:   Head: Normocephalic and atraumatic  Right Ear: Hearing and external ear normal    Left Ear: Hearing and external ear normal    Nose: Nose normal    Mouth/Throat: Uvula is midline, oropharynx is clear and moist and mucous membranes are normal  No oropharyngeal exudate  Eyes: Conjunctivae, EOM and lids are normal  Pupils are equal, round, and reactive to light  Neck: Trachea normal, normal range of motion and phonation normal  Neck supple  No JVD present  No tracheal tenderness, no spinous process tenderness and no muscular tenderness present  No tracheal deviation present  No thyroid mass and no thyromegaly present  Cardiovascular: Normal rate, regular rhythm, S1 normal, S2 normal, normal heart sounds and intact distal pulses  Exam reveals no gallop and no friction rub  No murmur heard  Pulses:       Radial pulses are 2+ on the right side, and 2+ on the left side  Dorsalis pedis pulses are 2+ on the right side, and 2+ on the left side  Posterior tibial pulses are 2+ on the right side, and 2+ on the left side  Pulmonary/Chest: Effort normal and breath sounds normal  No stridor  No respiratory distress  He has no decreased breath sounds  He has no wheezes  He has no rhonchi  He has no rales  He exhibits no tenderness  Abdominal: Soft  He exhibits no distension and no mass  There is no tenderness   There is no rigidity, no rebound, no guarding and no CVA tenderness  Musculoskeletal: Normal range of motion  He exhibits no edema, tenderness or deformity  Lymphadenopathy:     He has no cervical adenopathy  Neurological: He has normal strength  He is disoriented (Oriented to person/place  Initially stated that month was January but year 2017 )  No cranial nerve deficit or sensory deficit  He exhibits normal muscle tone  Coordination (Finger/nose wnl bilaterally) normal  GCS eye subscore is 4  GCS verbal subscore is 4  GCS motor subscore is 6  PERRLA; EOMI  Sensation intact to light touch over face in V1-V3 distribution bilaterally  Facial expressions symmetric  Tongue/uvula midline  Shoulder shrug equal bilaterally  Strength 5/5 in UE/LE bilaterally  Sensation intact to light touch in UE/LE bilaterally  Skin: Skin is warm, dry and intact  No rash noted  He is not diaphoretic  No erythema  Psychiatric: He has a normal mood and affect  His speech is normal  He is withdrawn  Cognition and memory are not impaired  He exhibits normal recent memory and normal remote memory  Patient somewhat inattentive particularly when not actively stimulated or spoken to and required multiple reminders to attend to examination and answer questions  Overall mood slightly withdrawn; when speaking however, he is briskly conversant and appropriately responsive  He is inattentive  Nursing note and vitals reviewed        ED Medications  Medications   sodium chloride 0 9 % infusion (not administered)   acetaminophen (TYLENOL) tablet 650 mg (not administered)   aspirin chewable tablet 81 mg (not administered)   sodium chloride 0 9 % bolus 1,000 mL (1,000 mL Intravenous New Bag 12/5/17 2203)   iohexol (OMNIPAQUE) 350 MG/ML injection (SINGLE-DOSE) 100 mL (100 mL Intravenous Given 12/5/17 2308)       Diagnostic Studies  Results Reviewed     Procedure Component Value Units Date/Time    Urine culture [[id number]]     Lab Status:  No result Specimen:  Urine from Urine, Clean Catch     Blood gas, arterial [[id number]]     Lab Status:  No result Specimen:  Blood, Arterial     Blood gas, venous [[id number]]  (Abnormal) Collected:  12/05/17 2244    Lab Status:  Final result Specimen:  Blood from Arm, Left Updated:  12/05/17 2255     pH, Ab 7 392     pCO2, Ab 48 1 mm Hg      pO2, Ab 85 6 (H) mm Hg      HCO3, Ab 28 6 mmol/L      Base Excess, Ab 2 9 mmol/L      O2 Content, Ab 17 7 ml/dL      O2 HGB, VENOUS 90 4 (H) %     Lactic acid, plasma [[id number]]  (Normal) Collected:  12/05/17 2124    Lab Status:  Final result Specimen:  Blood from Arm, Right Updated:  12/05/17 2148     LACTIC ACID 1 1 mmol/L     Narrative:         Result may be elevated if tourniquet was used during collection  TSH [[id number]]  (Normal) Collected:  12/05/17 2109    Lab Status:  Final result Specimen:  Blood from Arm, Right Updated:  12/05/17 2147     TSH 3RD GENERATON 2 046 uIU/mL     Narrative:         Patients undergoing fluorescein dye angiography may retain small amounts of fluorescein in the body for 48-72 hours post procedure  Samples containing fluorescein can produce falsely depressed TSH values  If the patient had this procedure,a specimen should be resubmitted post fluorescein clearance      Acetaminophen level [[id number]]  (Abnormal) Collected:  12/05/17 2109    Lab Status:  Final result Specimen:  Blood from Arm, Right Updated:  12/05/17 2144     Acetaminophen Level <2 0 (L) ug/mL     Salicylate level [[id number]]  (Normal) Collected:  12/05/17 2109    Lab Status:  Final result Specimen:  Blood from Arm, Right Updated:  47/35/09 6365     Salicylate Lvl 3 0 mg/dL     Troponin I [[id number]]  (Normal) Collected:  12/05/17 2109    Lab Status:  Final result Specimen:  Blood from Arm, Right Updated:  12/05/17 2140     Troponin I <0 02 ng/mL     Narrative:         [employer] Chemistry analyzer 99% cutoff is > 0 04 ng/mL in network labs    o cTnI 99% cutoff is useful only when applied to patients in the clinical setting of myocardial ischemia  o cTnI 99% cutoff should be interpreted in the context of clinical history, ECG findings and possibly cardiac imaging to establish correct diagnosis  o cTnI 99% cutoff may be suggestive but clearly not indicative of a coronary event without the clinical setting of myocardial ischemia  Comprehensive metabolic panel [[id number]]  (Abnormal) Collected:  12/05/17 2109    Lab Status:  Final result Specimen:  Blood from Arm, Right Updated:  12/05/17 2136     Sodium 141 mmol/L      Potassium 3 8 mmol/L      Chloride 102 mmol/L      CO2 33 (H) mmol/L      Anion Gap 6 mmol/L      BUN 17 mg/dL      Creatinine 0 98 mg/dL      Glucose 108 mg/dL      Calcium 8 5 mg/dL      AST 14 U/L      ALT 29 U/L      Alkaline Phosphatase 39 (L) U/L      Total Protein 7 0 g/dL      Albumin 3 6 g/dL      Total Bilirubin 0 50 mg/dL      eGFR 94 ml/min/1 73sq m     Narrative:         National Kidney Disease Education Program recommendations are as follows:  GFR calculation is accurate only with a steady state creatinine  Chronic Kidney disease less than 60 ml/min/1 73 sq  meters  Kidney failure less than 15 ml/min/1 73 sq  meters  Ethanol [[id number]]  (Normal) Collected:  12/05/17 2109    Lab Status:  Final result Specimen:  Blood from Arm, Right Updated:  12/05/17 2135     Ethanol Lvl <3 mg/dL     Magnesium [[id number]]  (Normal) Collected:  12/05/17 2109    Lab Status:  Final result Specimen:  Blood from Arm, Right Updated:  12/05/17 2133     Magnesium 2 0 mg/dL     Ammonia [[id number]]  (Normal) Collected:  12/05/17 2109    Lab Status:  Final result Specimen:  Blood from Arm, Right Updated:  12/05/17 2129     Ammonia 12 umol/L     Lyme Antibody Profile with reflex to [facility] [[id number]] Collected:  12/05/17 2124    Lab Status:   In process Specimen:  Blood from Arm, Right Updated:  12/05/17 2128    Protime-INR [[id number]]  (Normal) Collected:  12/05/17 2109    Lab Status:  Final result Specimen:  Blood from Arm, Right Updated:  12/05/17 2124     Protime 12 2 seconds      INR 0 91    CBC and differential [[id number]]  (Abnormal) Collected:  12/05/17 2109    Lab Status:  Final result Specimen:  Blood from Arm, Right Updated:  12/05/17 2117     WBC 5 72 Thousand/uL      RBC 4 60 Million/uL      Hemoglobin 13 7 g/dL      Hematocrit 41 4 %      MCV 90 fL      MCH 29 8 pg      MCHC 33 1 g/dL      RDW 13 2 %      MPV 9 4 fL      Platelets 568 Thousands/uL      Neutrophils Relative 46 %      Lymphocytes Relative 45 (H) %      Monocytes Relative 7 %      Eosinophils Relative 2 %      Basophils Relative 0 %      Neutrophils Absolute 2 63 Thousands/µL      Lymphocytes Absolute 2 57 Thousands/µL      Monocytes Absolute 0 38 Thousand/µL      Eosinophils Absolute 0 12 Thousand/µL      Basophils Absolute 0 02 Thousands/µL     T4, free [[id number]] Collected:  12/05/17 2109    Lab Status: In process Specimen:  Blood from Arm, Right Updated:  12/05/17 2114    Rapid drug screen, urine [[id number]]     Lab Status:  No result Specimen:  Urine     UA w Reflex to Microscopic w Reflex to Culture [[id number]]     Lab Status:  No result Specimen:  Urine     Fingerstick Glucose (POCT) [[id number]]  (Normal) Collected:  12/05/17 2049    Lab Status:  Final result Updated:  12/05/17 2051     POC Glucose 90 mg/dl                  XR chest 2 views   ED Interpretation by Yasmin Aiken DO (12/05 2127)   Airway is midline  Lungs are clear bilaterally with no evidence of pulmonary vascular congestion/focal infiltrate/pleural effusion//pneumothorax  Cardiac and mediastinal silhouettes are within normal limits  Osseous structures appear normal       Final Result by Benny Carr MD (12/05 2201)      No active pulmonary disease  Workstation performed: [id number]         CT head without contrast   Final Result by Benny Carr MD (12/05 2120)      No acute intracranial abnormality           Workstation performed: [id number]         PE Study with CT abdomen & pelvis with contrast    (Results Pending)              Procedures  ECG 12 Lead Documentation  Date/Time: 12/5/2017 9:28 PM  Performed by: Lara Duarte  Authorized by: Lara Duarte     Indications / Diagnosis:  Altered mental status  ECG reviewed by me, the ED Provider: yes    Patient location:  ED  Previous ECG:     Previous ECG:  Compared to current    Comparison ECG info:  4 Nov 2015    Similarity:  No change    Comparison to cardiac monitor: Yes    Interpretation:     Interpretation: normal    Rate:     ECG rate:  98    ECG rate assessment: normal    Rhythm:     Rhythm: sinus rhythm    Ectopy:     Ectopy: none    QRS:     QRS axis:  Normal    QRS intervals:  Normal  Conduction:     Conduction: normal    ST segments:     ST segments:  Normal  T waves:     T waves: normal    Comments:      Pr 166 qrs 116 qtc 474           Phone Contacts  ED Phone Contact    ED Course  ED Course as of Dec 05 2355   Tue Dec 05, 2017   2155 1  WBC wnl   2  Hg/Hct wnl   3  Plt wnl   4  Electrolytes wnl   5  TSH wnl  T4 pending  6  Specific drug levels negative: EtOH/apap/salicylate  7  Ammonia wnl   8  Lactic acid wnl   9  UA/UDS pending  10  Lyme serology pending  11  CT head reviewed and wnl  No acute pathology present  12  CXR reviewed and wnl         2234 Persistent hypoxemia of unclear etiology while breathing room air without obvious hypoventilation and no acute abnormalities on CXR or physical exam  Will send VBG to assess cause of arterial hypoxemia and will obtain CTA chest/CT ap to assess for other acute abnormalities  Patient remains awake/alert without evidence of acute worsening or further change in mental status  D/w Dr Radha Valente of [address] [place] [address]  Patient case discussed; he agrees to accept patient in observation admission  2325 VBG: normal pH with normal PCO2 and HCO3          MDM  Number of Diagnoses or Management Options  Altered mental status: new and requires workup  Confusion: new and requires workup  Hypoxemia: new and requires workup     Amount and/or Complexity of Data Reviewed  Clinical lab tests: ordered and reviewed  Tests in the radiology section of CPT®: ordered and reviewed  Decide to obtain previous medical records or to obtain history from someone other than the patient: yes  Obtain history from someone other than the patient: yes  Review and summarize past medical records: yes  Discuss the patient with other providers: yes  Independent visualization of images, tracings, or specimens: yes    Risk of Complications, Morbidity, and/or Mortality  Presenting problems: high  Diagnostic procedures: high  Management options: high    Patient Progress  Patient progress: stable    The patient presented with a condition in which there was a high probability of imminent or life-threatening deterioration, and critical care services (excluding separately billable procedures) totalled 30-74 minutes  Disposition  Final diagnoses: Altered mental status   Confusion   Hypoxemia     Time reflects when diagnosis was documented in both MDM as applicable and the Disposition within this note     Time User Action Codes Description Comment    12/5/2017 10:33 PM Lizett Serum Add [R41 82] Altered mental status     12/5/2017 10:33 PM Lizett Serum Add [R41 0] Confusion     12/5/2017 10:33 PM Lizett Serum Add [R09 02] Hypoxemia       ED Disposition     ED Disposition Condition Comment    Admit  Case was discussed with Dr Lori Aldana and the patient's admission status was agreed to be Admission Status: observation status to the service of Dr Lori Aldana  Follow-up Information    None       Patient's Medications   Discharge Prescriptions    No medications on file     No discharge procedures on file      ED Provider  Electronically Signed by           Fidencio Jay DO  12/06/17 0008

## 2017-12-06 NOTE — PHYSICAL THERAPY NOTE
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)   Comments (I) with driving   Restrictions/Precautions   Other Precautions Droplet precautions;Multiple lines;Telemetry;O2;Pain   General   Family/Caregiver Present No   Cognition   Arousal/Participation Alert   Orientation Level Oriented X4   Following Commands Follows all commands and directions without difficulty   RLE Assessment   RLE Assessment WFL  (5/5)   LLE Assessment   LLE Assessment WFL  (5/5)   Coordination   Sensation WFL   Light Touch   RLE Light Touch Grossly intact   LLE Light Touch Grossly intact   Bed Mobility   Supine to Sit 7  Independent   Sit to Supine 7  Independent   Additional Comments no difficulty with bed mobility  pt on 2L's with SpO2 at rest 94-95%  Transfers   Sit to Stand 7  Independent   Stand to Sit 7  Independent   Stand pivot 7  Independent   Additional Comments pt with no difficulty performing transfers or ambulation  pt ambulates with a normal gait pattern no LOB or instability  Pt with normal coordination and balance tests  Normal alternate toe tapping and finger to nose      Ambulation/Elevation   Gait pattern WNL   Gait Assistance 7  Independent   Assistive Device None   Distance 200ft no A D  (I) but presents with decreased endurance with limited ambulation tolerance   Balance   Static Sitting Good   Dynamic Sitting Good   Static Standing Good   Dynamic Standing Good   Ambulatory Fair +   Endurance Deficit   Endurance Deficit Yes   Endurance Deficit Description limited ambulation tolerance due to fatigue   Activity Tolerance   Activity Tolerance Patient limited by fatigue   Assessment   Prognosis Good   Problem List Decreased endurance;Decreased mobility;Pain   Assessment Pt is a 36year old male presenting with fair to good strength balance and functional mobility performing all bed mobility transfers and ambulation at an (I) level  Pt however presents with decreased endurance and limited ambulation tolerance requiring 2L's O2 to maintain SpO2 at 93-94%  Pt with no strength or coordination deficits  Pt would benefit from continued PT to improve endurance ambulation and to ensure (I) stair negotiation  Goals   Patient Goals To feel better and return home   STG Expiration Date 12/13/17   Short Term Goal #1 (I) with ambulation 500ft no A D  Short Term Goal #2 (I) with stair negotiation 11 steps with HR    Plan   Treatment/Interventions Functional transfer training;LE strengthening/ROM; Elevations; Therapeutic exercise; Endurance training;Patient/family training;Bed mobility;Gait training   PT Frequency (3-5x/wk)   Recommendation   Recommendation Home independently   PT - OK to Discharge Yes   Pt with SCD's on when PT entered room  SCD's reapplied and turned on with pt supine in bed and call bell in reach

## 2017-12-06 NOTE — OCCUPATIONAL THERAPY NOTE
Occupational Therapy Evaluation     Patient Name: Megan BETANCOURT Date: 12/6/2017  Problem List  Patient Active Problem List   Diagnosis    Hypoxia    Acute encephalopathy    Tobacco abuse    Opiate dependence, continuous ([facility] [address] )    Essential hypertension    Expressive aphasia    Chronic back pain    Hyperphosphatemia     Past Medical History  Past Medical History:   Diagnosis Date    Chronic pain     back    Hypertension      Past Surgical History  Past Surgical History:   Procedure Laterality Date    CARPAL TUNNEL RELEASE      ROTATOR CUFF REPAIR      ULNAR NERVE REPAIR               12/06/17 9639   Note Type   Note type Eval only   Restrictions/Precautions   Weight Bearing Precautions Per Order No   Other Precautions Droplet precautions;Multiple lines;Telemetry;O2;Fall Risk;Pain   Pain Assessment   Pain Assessment 0-10   Pain Score 6   Pain Type Chronic pain   Pain Location Back   Home Living   Additional Comments see PT evaluation    Prior Function   Level of [place] Independent with ADLs and functional mobility   Lives With Spouse   ADL Assistance Independent   IADLs Independent   Falls in the last 6 months 1 to 4   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   Psychosocial   Psychosocial (WDL) X   Patient Behaviors/Mood Flat affect   ADL   Where Assessed Edge of bed   LB Dressing Assistance 7  Independent   Bed Mobility   Supine to Sit 6  Modified independent   Additional items Bedrails   Sit to Supine 6  Modified independent   Additional items Bedrails   Transfers   Sit to Stand 5  Supervision   Stand to Sit 5  Supervision   Additional Comments pt did not utilize device during session and stated he had no dizziness; pt with slight LOB at start of session   Functional Mobility   Functional Mobility 5  Supervision   Additional Comments pt performed FM c no device in hallway with no dizziness; SpO2 at start of session was 94% and at end of session was 96%;   Balance Static Sitting Good   Dynamic Sitting Good   Static Standing Fair +   Dynamic Standing Fair +   Ambulatory Fair   Activity Tolerance   Activity Tolerance Patient limited by fatigue;Patient limited by pain   RUE Assessment   RUE Assessment WFL   LUE Assessment   LUE Assessment WFL   Hand Function   Gross Motor Coordination Functional   Fine Motor Coordination Functional   Sensation   Light Touch No apparent deficits   Sharp/Dull No apparent deficits   Cognition   Overall Cognitive Status WFL   Arousal/Participation Alert   Attention Within functional limits   Orientation Level Oriented X4   Memory Within functional limits   Following Commands Follows all commands and directions without difficulty   Assessment   Assessment pt with no functional limitations requiring skilled OT intervention; pt is more appropriate for continued PT intervention at this time to improve endurance and FM prior to return home to ensure safety   Recommendation   OT Discharge Recommendation Home with family support   OT - OK to Discharge Yes   Barthel Index   Feeding 10   Bathing 5   Grooming Score 5   Dressing Score 10   Bladder Score 10   Bowels Score 10   Toilet Use Score 10   Transfers (Bed/Chair) Score 10   Mobility (Level Surface) Score 10   Stairs Score 0   Barthel Index Score 80     Pt left supine in bed at end of session; all needs within reach; all lines intact; scds connected and turned on  Pt is performing at [facility]; OT services will be discontinued at this time

## 2017-12-06 NOTE — SOCIAL WORK
Met with pt to discuss role as  in helping pt to develop discharge plan and to help pt carry out their plan  Pt lives in a split level house with his wife  Pt has 2 steps on the outside with no railing  Pt has 8 steps on the inside  Pt has no DME at home  Pt is independent with ADL's  Pt's wife does the cooking and cleaning  Pt and his wife both drive  Pt has never had home care or any services in the home  Pt's PCP is Dr Selene Dawson  Pt uses [facility]  Discharge checklist discussed with patient with good understanding  Pt with no Case management needs will continue to follow

## 2017-12-06 NOTE — CASE MANAGEMENT
Initial Clinical Review    Admission: Date/Time/Statement: OBSERVATION 12/5/17 @ 2233    Orders Placed This Encounter   Procedures    Place in Observation     Standing Status:   Standing     Number of Occurrences:   1     Order Specific Question:   Admitting Physician     Answer:   Cong Pratt [id number]     Order Specific Question:   Level of Care     Answer:   Med Surg [16]   ED: Date/Time/Mode of Arrival:   ED Arrival Information     Expected Arrival Acuity Means of Arrival Escorted By Service Admission Type    - 12/5/2017 20:40 Emergent Walk-In Family Member General Medicine Emergency    Arrival Complaint    ALT MENTAL STATUS      Chief Complaint:   Chief Complaint   Patient presents with    Altered Mental Status     Pt's family reports he had sudden onset of confusion  Pt has some unsteady gait  Pt's family reports change in speech   History of Illness: This is a 36year old male with the noted past medical history who presents with an abrupt episode of confusion and abnormal mental status beginning at home approximately 40 minutes prior to arrival and witnessed by his son who was present at the onset of symptoms  In discussion with his son, he describes the patient as having had brief episode of generalized shaking that occurred with eyes closed followed by a period of abnormal speech that lasted several minutes; in particular, the patient was noted to state, "That girl in [place] got killed and I'm going to find the marcus that did it " 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  that were nonsensical   Patient's wife was then contacted and brought him to the emergency department  Patient otherwise had been in his normal state of health; he had had 3 mechanical falls occurring within the past month  The patient denied any difficulties with gait/instability/unsteadiness on his feet that occurred prior to those falls    He does have a previous history of similar symptoms occurring in ; he was seen in this emergency department initially and subsequently transferred to [facility] where he underwent extensive workup that included EEG/LP that were unremarkable  He additionally had an MRI at that time that was also unremarkable  He has not had any episodes exactly analogous to the current one but his wife notes intermittent episodes in which he will appear slightly confused or dazed lasting a few minutes at a time  Denies associated fever/chills/focal weakness/focal paresthesias/chest pain/headache/blurred vision/double vision/nausea/vomiting/abdominal pain/rash  Of note, the patient's father-in-law  just over one month prior and both the patient and his other family members were strongly affected by this  Psychiatric/Behavioral: Positive for confusion and decreased concentration  Neurological: He has normal strength  He is disoriented (Oriented to person/place  Initially stated that month was January but year  )  No cranial nerve deficit or sensory deficit  He exhibits normal muscle tone  Coordination (Finger/nose wnl bilaterally) normal  GCS eye subscore is 4  GCS verbal subscore is 4  GCS motor subscore is 6  PERRLA; EOMI  Sensation intact to light touch over face in V1-V3 distribution bilaterally  Facial expressions symmetric  Tongue/uvula midline  Shoulder shrug equal bilaterally  Strength 5/5 in UE/LE bilaterally  Sensation intact to light touch in UE/LE bilaterally  Skin: Skin is warm, dry and intact  No rash noted  He is not diaphoretic  No erythema  Psychiatric: He has a normal mood and affect  His speech is normal  He is withdrawn  Cognition and memory are not impaired  He exhibits normal recent memory and normal remote memory  Patient somewhat inattentive particularly when not actively stimulated or spoken to and required multiple reminders to attend to examination and answer questions   Overall mood slightly withdrawn; when speaking however, he is briskly conversant and appropriately responsive  He is inattentive  ED Vital Signs:   ED Triage Vitals   Temperature Pulse Respirations Blood Pressure SpO2   12/05/17 2050 12/05/17 2050 12/05/17 2050 12/05/17 2050 12/05/17 2050   99 2 °F (37 3 °C) 95 17 125/72 94 %      Temp Source Heart Rate Source Patient Position - Orthostatic VS BP Location FiO2 (%)   12/05/17 2050 12/05/17 2050 12/05/17 2050 12/05/17 2050 --   Temporal Monitor Sitting Left arm       Pain Score       12/05/17 2315       5        Wt Readings from Last 1 Encounters:   12/06/17 90 7 kg (199 lb 15 3 oz)   Vital Signs (abnormal): Abnormal Labs/Diagnostic Test Results:   ACETAMINOPHEN <2 0   CARBOXYHGB 4 1   pH, Arterial 7 350 - 7 450 7 332   L    pCO2, Arterial 36 0 - 44 0 mm Hg 53 3   H    pO2, Arterial 75 0 - 129 0 mm Hg 90 8     HCO3, Arterial 22 0 - 28 0 mmol/L 27 6     Base Excess, Arterial mmol/L 0 8     O2 Content, Arterial 16 0 - 23 0 mL/dL 17 5     O2 HGB,Arterial  94 0 - 97 0 % 91 9   L    SOURCE   Radial, Left     EARLENE TEST  Yes     Nasal Cannula  2     CT HEAD=No acute intracranial abnormality  CXR=No active pulmonary disease  CTA CHEST, A/P=No evidence of pulmonary embolus  No acute intra-abdominal abnormality    ECG 12 Lead Documentation  Date/Time: 12/5/2017 9:28 PM  Performed by: Efraín Rodriguez  Authorized by: Efraín Rodriguez   Indications / Diagnosis:  Altered mental status  ECG reviewed by me, the ED Provider: yes    Patient location:  ED  Previous ECG:     Previous ECG:  Compared to current    Comparison ECG info:  4 Nov 2015    Similarity:  No change    Comparison to cardiac monitor: Yes    Interpretation:     Interpretation: normal    Rate:     ECG rate:  98    ECG rate assessment: normal    Rhythm:     Rhythm: sinus rhythm    Ectopy:     Ectopy: none    QRS:     QRS axis:  Normal    QRS intervals:  Normal  Conduction:     Conduction: normal    ST segments:     ST segments:  Normal  T waves: T waves: normal    Comments:      Pr 166 qrs 116 qtc 474  ED Treatment:   Medication Administration from 12/05/2017 2040 to 12/05/2017 2350       Date/Time Order Dose Route Action Action by Comments     12/05/2017 2342 sodium chloride 0 9 % bolus 1,000 mL 0 mL Intravenous Stopped Shailesh Muir RN      12/05/2017 2203 sodium chloride 0 9 % bolus 1,000 mL 1,000 mL Intravenous New Bag Philip Orr RN      12/05/2017 2337 sodium chloride 0 9 % infusion 125 mL/hr Intravenous New Bag Shailesh Muir RN      12/05/2017 2308 iohexol (OMNIPAQUE) 350 MG/ML injection (SINGLE-DOSE) 100 mL 100 mL Intravenous Given Kaur Mirza      12/05/2017 2329 aspirin chewable tablet 81 mg 81 mg Oral Given Shailesh Muir RN       Past Medical/Surgical History: Active Ambulatory Problems     Diagnosis Date Noted    No Active Ambulatory Problems     Resolved Ambulatory Problems     Diagnosis Date Noted    No Resolved Ambulatory Problems     Past Medical History:   Diagnosis Date    Chronic pain     Hypertension    Admitting Diagnosis: Hypoxemia [R09 02]  Confusion [R41 0]  Altered mental status [R41 82]  Acute encephalopathy [G93 40]  Age/Sex: 36 y o  male  Assessment/Plan:   Patient Active Problem List   Diagnosis    Hypoxia    Acute encephalopathy    Tobacco abuse    Opiate dependence, continuous ([facility] [address] )    Essential hypertension    Expressive aphasia    Chronic back pain    Plan:  1)  Acute encephalopathy/Expressive aphasia:  Place the patient in 23-hour observation, med/surg level of care with telemetry monitoring  Initiate the Stroke/TIA pathway  Aspirin 81 mg PO Qdaily  NSS IV fluids  In the setting of hypoxia, check an ABG  Initiate continuous pulse oximetry  Check a rapid urine drug screen  NSS IV fluids  Check a carboxyhemoglobin (carbon monoxide) level  Check a Lyme antibody profile  Check a RPR    Check an MRI of the brain with and without contrast   Check an MRA of the head without contrast  Check a carotid duplex bilaterally  Check an EEG  Consult Tele Neurology  Check a urinalysis and urine culture  His ammonia level was within normal limits  The patient may require transfer to [address] [place] [address] for EMU (24-hour EEG monitoring)  I will hold his Oxycontin as well as his Alprazolam, both of which could be contributing to his symptomatology  He may need an LP (lumbar puncture) if his symptoms persist   Speech/swallow evaluation  Consult PT and OT  The patient may have PRES (posterior reversible encephalopathy syndrome) with his chronic hypertension and in the setting of the patient being non-compliant with his anti-hypertensive medication  2)  Hypoxia:  I will order supplemental oxygen via a nasal cannula to maintain his oxygen saturation at 92% and above  Initiate continuous pulse oximetry and the respiratory protocol  Incentive spirometry 10 times per hour while awake  Check an ABG  Check a CTA of the chest/PE protocol  He will need formal pulmonary function tests completed as an outpatient and will need outpatient follow-up with Pulmonology  Smoking cessation counseling  Check the respiratory pathogen profile    3)  Continuous opiate dependence/Chronic back pain:  Hold the patient's Oxycontin, which may be contributing to his current symptomatology  He will need to be seen by Pain Management as an outpatient  4)  Essential hypertension:  Per the patient's wife, the patient has been noncompliant with his anti-hypertensive medication  Continue PO Bisoprolol  Follow his blood pressure trend closely  5)  Tobacco abuse:  Nicotine patch  Smoking cessation counseling  6)  DVT Prophylaxis:  Lovenox 40 mg SQ every 24 hours  SCD's bilaterally    Admission Orders:  Telemetry  ASPIRATION PRECAUTIONS  NEURO CHECKS  CVA EDUCATIN  INCENTIVE SPIROMETRY  PT/OT/ST EVAL & TX  BLE VENODYNES  DROPLET ISOLATION  CONSULT NEURO  Scheduled Meds:   aspirin 81 mg Oral Daily bisoprolol 5 mg Oral Daily   enoxaparin 40 mg Subcutaneous Q24H   nicotine 1 patch Transdermal Daily     Continuous Infusions:   sodium chloride 125 mL/hr Last Rate: 125 mL/hr (12/05/17 9084)     PRN Meds:   acetaminophen    ondansetron

## 2017-12-06 NOTE — RESPIRATORY THERAPY NOTE
oxygen safety       When I went in to assess patient, I noticed he had a pack of cigarettes and a lighter on his night table, this patient is on oxygen  I removed the cigarettes, placing them in the drawer  I went over oxygen safety and the hazards of him smoking while wearing the oxygen  He informed me he knew of the risks and that he is a [employer]    I also notified the nurse about the cigarettes and lighter

## 2017-12-06 NOTE — DISCHARGE INSTRUCTIONS
Cigarette Smoking and Your Health   WHAT YOU NEED TO KNOW:   What are the risks to my health if I smoke tobacco?  Nicotine and other chemicals found in tobacco damage every cell in your body  Even if you are a light smoker, you have an increased risk for cancer, heart disease, and lung disease  If you are pregnant or have diabetes, smoking increases your risk for complications  What are the benefits to my health if I stop smoking? · You decrease respiratory symptoms such as coughing, wheezing, and shortness of breath  · You reduce your risk for cancers of the lung, mouth, throat, kidney, bladder, pancreas, stomach, and cervix  If you already have cancer, you increase the benefits of chemotherapy  You also reduce your risk for cancer returning or a second cancer from developing  · You reduce your risk for heart disease, blood clots, heart attack, and stroke  · You reduce your risk for lung infections, and diseases such as pneumonia, asthma, chronic bronchitis, and emphysema  · Your circulation improves  More oxygen can be delivered to your body  If you have diabetes, you lower your risk for complications, such as kidney, artery, and eye diseases  You also lower your risk for nerve damage  Nerve damage can lead to amputations, poor vision, and blindness  · You improve your body's ability to heal and to fight infections  What are the health benefits to others if I stop smoking? Tobacco is harmful to nonsmokers who breathe in your secondhand smoke  The following are ways the health of others around you may improve when you stop smoking:  · You lower the risks for lung cancer and heart disease in nonsmoking adults  · If you are pregnant, you lower the risk for miscarriage, early delivery, low birth weight, and stillbirth  You also lower your baby's risk for SIDS, obesity, developmental delay, and neurobehavioral problems, such as ADHD       · If you have children, you lower their risk for ear infections, colds, pneumonia, bronchitis, and asthma  Where can I find more information and support to stop smoking? · [employer]  Phone: 1- 607 - 083-5131  Web Address: www [employer]  CARE AGREEMENT:   You have the right to help plan your care  Learn about your health condition and how it may be treated  Discuss treatment options with your caregivers to decide what care you want to receive  You always have the right to refuse treatment  The above information is an  only  It is not intended as medical advice for individual conditions or treatments  Talk to your doctor, nurse or pharmacist before following any medical regimen to see if it is safe and effective for you  © 2017 [address] Information is for End User's use only and may not be sold, redistributed or otherwise used for commercial purposes  All illustrations and images included in CareNotes® are the copyrighted property of A D A M , Inc  or [employer]  Cigarette Smoking and Your Health   [place]  Preventive Services Task Force (USPSTF): Final recommendation statement : statin use for the primary prevention of cardiovascular disease in adults: preventive medication  [place]  Preventive Services Task Force (USPSTF)  Sammy Anaya MD  2016  Available from URL: [employer]agram.com pt  As accessed 2016-12-07  American Cancer Society: Benefits of quitting smoking over time  [address]  Available from URL: http://hellerFishNet Securitye [place]/  org/healthy/stayawayfromtobacco/benefits-of-quitting-smoking-over-time  As accessed 2016-11-18  American Cancer Society: Deciding to quit smoking and making a plan  [address]   Available from URL: http://[employer]/  org/healthy/stayawayfromtobacco/guidetoquittingsmoking/deciding-to-quit-smoking-and-making-a-plan  As accessed 2016-11-18  Centers for Disease Control and Prevention (CDC): Smoking & tobacco use: quitting smoking  Centers for Disease Control and Prevention ([facility])  [place], [address]  Available from URL: http://TradeCloud.nl/  As accessed 2016-11-18  Centers for Disease Control and Prevention (CDC): Tips from former smokers: making a quit plan  [facility] for Disease Control and Prevention ([facility])  [place], [address]  Available from URL: https://[employer]/  As accessed 2016-11-18  American Heart Association: How cigarettes damage your body  American Heart Association  [place], [address] [facility]  Available from URL: [employer]  As accessed 2015-06-30  American Heart Association: How Cigarettes Damage Your Body  American Heart Association  [place], [address] [facility]  Available from URL: [employer]  As accessed 2013-10-02  Kristina Guerrier RM: Exposure to tobacco smoke causes immediate damage: a report of the Surgeon [address]; 126(2):158-159  Carmen R & Danielito WS: Perioperative smoking risk  Anesthesiology 2011; 114(4):734-736  [address] [facility]: Harms of Smoking and Health Benefits of Quitting  [address], [place]  2011  Available from URL: [employer] ee  As accessed 2013-10-02  Brian RE, Eunice PM, & Meridian SA: Health effects of light and intermittent smoking: a review  Circulation 2010; 121(13):3802-1686    Preston Gray NM: Tobacco smoke exposure and chronic conditions of childhood  Pediatrics 2010; 126(1):g511-v674  © 2017 [address] Information is for End User's use only and may not be sold, redistributed or otherwise used for commercial purposes  All illustrations and images included in CareNotes® are the copyrighted property of A D A M , Inc  or [place]  The above information is an  only  It is not intended as medical advice for individual conditions or treatments  Talk to your doctor, nurse or pharmacist before following any medical regimen to see if it is safe and effective for you  How to Stop Smoking   WHAT YOU NEED TO KNOW:   You will improve your health and the health of others around you if you stop smoking  Your risk for heart and lung disease, cancer, stroke, heart attack, and vision problems will also decrease  You can benefit from quitting no matter how long you have smoked  DISCHARGE INSTRUCTIONS:   Prepare to stop smoking:  Nicotine is a highly addictive drug found in cigarettes  Withdrawal symptoms can happen when you stop smoking and make it hard to quit  These include anxiety, depression, irritability, trouble sleeping, and increased appetite  You increase your chances of success if you prepare to quit  · Set a quit date  Gradie Minor a date that is within the next 2 weeks  Do not pick a day that you think may be stressful or busy  Write down the day or [place] it on your calender  · Tell friends and family that you plan to quit  Explain that you may have withdrawal symptoms when you try to quit  Ask them to support you  They may be able to encourage you and help reduce your stress to make it easier for you to quit  · Make a list of your reasons for quitting  Put the list somewhere you will see it every day, such as your refrigerator  You can look at the list when you have a craving  · Remove all tobacco and nicotine products from your home, car, and workplace  Also, remove anything else that will tempt you to smoke, such as lighters, matches, or ashtrays  Clean your car, home, and places at work that smell like smoke  The smell of smoke can trigger a craving  · Identify triggers that make you want to smoke  This may include activities, feelings, or people  Also write down 1 way you can deal with each of your triggers  For example, if you want to smoke as soon as you wake up, plan another activity during this time, such as exercise  · Make a plan for how you will quit  Learn about the tools that can help you quit, such as medicine, counseling, or nicotine replacement therapy  Choose at least 2 options to help you quit  Tools to help you stop smoking:   · Counseling  from a trained healthcare provider can provide you with support and skills to quit smoking  The provider will also teach you to manage your withdrawal symptoms and cravings  You may receive counseling from one counselor, in group therapy, or through phone therapy called a quit line  · Nicotine replacement therapy (NRT)  such as nicotine patches, gum, or lozenges may help reduce your nicotine cravings  You may get these without a doctor's order  Do not use e-cigarettes or smokeless tobacco in place of cigarettes or to help you quit  They still contain nicotine  · Prescription medicines  such as nasal sprays or nicotine inhalers may help reduce your withdrawal symptoms  Other medicines may also be used to reduce your urge to smoke  Ask your healthcare provider about these medicines  You may need to start certain medicines 2 weeks before your quit date for them to work well  · Hypnosis  is a practice that helps guide you through thoughts and feelings  Hypnosis may help decrease your cravings and make you more willing to quit  · Acupuncture therapy  uses very thin needles to balance energy channels in the body   This is thought to help decrease cravings and symptoms of nicotine withdrawal  · Support groups  let you talk to others who are trying to quit or have already quit  It may be helpful to speak with others about how they quit  Manage your cravings:   · Avoid situations, people, and places that tempt you to smoke  Go to nonsmoking places, such as libraries or restaurants  Understand what tempts you and try to avoid these things  · Keep your hands busy  Hold things such as a stress ball or pen  · Put candy or toothpicks in your mouth  Keep lollipops, sugarless gum, or toothpicks with you at all times  · Do not have alcohol or caffeine  These drinks may tempt you to smoke  Drink healthy liquids such as water or juice instead  · Reward yourself when you resist your cravings  Rewards will motivate you and help you stay positive  · Do an activity that distracts you from your craving  Examples include going for a walk, exercising, or cleaning  Prevent weight gain after you quit:  You may gain a few pounds after you quit smoking  It is healthier for you to gain a few pounds than to continue to smoke  The following can help you prevent weight gain:  · Eat healthy foods  These include fruits, vegetables, whole-grain breads, low-fat dairy products, beans, lean meats, and fish  Eat healthy snacks, such as low-fat yogurt, if you get hungry between meals  · Drink water before, during, and between meals  This will make your stomach feel full and help prevent you from overeating  Ask your healthcare provider how much liquid to drink each day and which liquids are best for you  · Exercise  Take a walk or do some kind of exercise every day  Ask your healthcare provider what exercise is right for you  This may help reduce your cravings and reduce stress  For support and more information:   · Smokefree  gov  Phone: 5- 780 - 772-5814  Web Address: www smokefree  gov  © 2017 [address] Information is for End User's use only and may not be sold, redistributed or otherwise used for commercial purposes  All illustrations and images included in CareNotes® are the copyrighted property of A D A M , Inc  or [place]  The above information is an  only  It is not intended as medical advice for individual conditions or treatments  Talk to your doctor, nurse or pharmacist before following any medical regimen to see if it is safe and effective for you  How to Stop Smoking   WHAT YOU NEED TO KNOW:   Why should I stop smoking? You will improve your health and the health of others around you if you stop smoking  Your risk for heart and lung disease, cancer, stroke, heart attack, and vision problems will also decrease  You can benefit from quitting no matter how long you have smoked  How can I prepare to stop smoking? Nicotine is a highly addictive drug found in cigarettes  Withdrawal symptoms can happen when you stop smoking and make it hard to quit  These include anxiety, depression, irritability, trouble sleeping, and increased appetite  You increase your chances of success if you prepare to quit  · Set a quit date  [place] a date that is within the next 2 weeks  Do not pick a day that you think may be stressful or busy  Write down the day or [place] it on your calender  · Tell friends and family that you plan to quit  Explain that you may have withdrawal symptoms when you try to quit  Ask them to support you  They may be able to encourage you and help reduce your stress to make it easier for you to quit  · Make a list of your reasons for quitting  Put the list somewhere you will see it every day, such as your refrigerator  You can look at the list when you have a craving  · Remove all tobacco and nicotine products from your home, car, and workplace  Also, remove anything else that will tempt you to smoke, such as lighters, matches, or ashtrays  Clean your car, home, and places at work that smell like smoke   The smell of smoke can trigger a craving  · Identify triggers that make you want to smoke  This may include activities, feelings, or people  Also write down 1 way you can deal with each of your triggers  For example, if you want to smoke as soon as you wake up, plan another activity during this time, such as exercise  · Make a plan for how you will quit  Learn about the tools that can help you quit, such as medicine, counseling, or nicotine replacement therapy  Choose at least 2 options to help you quit  What are some tools to help me stop smoking? · Counseling  from a trained healthcare provider can provide you with support and skills to quit smoking  The provider will also teach you to manage your withdrawal symptoms and cravings  You may receive counseling from one counselor, in group therapy, or through phone therapy called a quit line  · Nicotine replacement therapy (NRT)  such as nicotine patches, gum, or lozenges may help reduce your nicotine cravings  You may get these without a doctor's order  Do not use e-cigarettes or smokeless tobacco in place of cigarettes or to help you quit  They still contain nicotine  · Prescription medicines  such as nasal sprays or nicotine inhalers may help reduce your withdrawal symptoms  Other medicines may also be used to reduce your urge to smoke  Ask your healthcare provider about these medicines  You may need to start certain medicines 2 weeks before your quit date for them to work well  · Hypnosis  is a practice that helps guide you through thoughts and feelings  Hypnosis may help decrease your cravings and make you more willing to quit  · Acupuncture therapy  uses very thin needles to balance energy channels in the body  This is thought to help decrease cravings and symptoms of nicotine withdrawal      · Support groups  let you talk to others who are trying to quit or have already quit  It may be helpful to speak with others about how they quit    How can I manage my cravings? · Avoid situations, people, and places that tempt you to smoke  Go to nonsmoking places, such as libraries or restaurants  Understand what tempts you and try to avoid these things  · Keep your hands busy  Hold things such as a stress ball or pen  · Put candy or toothpicks in your mouth  Keep lollipops, sugarless gum, or toothpicks with you at all times  · Do not have alcohol or caffeine  These drinks may tempt you to smoke  Drink healthy liquids such as water or juice instead  · Reward yourself when you resist your cravings  Rewards will motivate you and help you stay positive  · Do an activity that distracts you from your craving  Examples include going for a walk, exercising, or cleaning  What should I know about weight gain after I quit? You may gain a few pounds after you quit smoking  It is healthier for you to gain a few pounds than to continue to smoke  The following can help you prevent weight gain:  · Eat healthy foods  These include fruits, vegetables, whole-grain breads, low-fat dairy products, beans, lean meats, and fish  Eat healthy snacks, such as low-fat yogurt, if you get hungry between meals  · Drink water before, during, and between meals  This will make your stomach feel full and help prevent you from overeating  Ask your healthcare provider how much liquid to drink each day and which liquids are best for you  · Exercise  Take a walk or do some kind of exercise every day  Ask your healthcare provider what exercise is right for you  This may help reduce your cravings and reduce stress  Where can I find support and more information? · [employer]  Phone: 8- 441 - 016-5409  Web Address: www [employer]  CARE AGREEMENT:   You have the right to help plan your care  Learn about your health condition and how it may be treated  Discuss treatment options with your caregivers to decide what care you want to receive   You always have the right to refuse treatment  The above information is an  only  It is not intended as medical advice for individual conditions or treatments  Talk to your doctor, nurse or pharmacist before following any medical regimen to see if it is safe and effective for you  © 2017 [address] Information is for End User's use only and may not be sold, redistributed or otherwise used for commercial purposes  All illustrations and images included in CareNotes® are the copyrighted property of A D A M , Inc  or [address]

## 2017-12-06 NOTE — PROGRESS NOTES
The ABG was completed with the following results:  [facility] for [facility] (MRN [id number]) as of 12/6/2017 06:12   Ref  Range 12/6/2017 00:12   EARLENE TEST Unknown Yes   pH, Arterial Latest Ref Range: 7 350 - 7 450  7 332 (L)   pCO2, Arterial Latest Ref Range: 36 0 - 44 0 mm Hg 53 3 (H)   pO2, Arterial Latest Ref Range: 75 0 - 129 0 mm Hg 90 8   HCO3, Arterial Latest Ref Range: 22 0 - 28 0 mmol/L 27 6   Base Excess, Arterial Latest Units: mmol/L 0 8   O2 Content, Arterial Latest Ref Range: 16 0 - 23 0 mL/dL 17 5   O2 HGB,Arterial Latest Ref Range: 94 0 - 97 0 % 91 9 (L)   ABG SOURCE Unknown Radial, Left   NASAL CANNULA (HISTORICAL) Unknown 2     I will have a repeat ABG completed at 8:00 a m  this morning  He may require BiPAP therapy or the Vapotherm unit  His chronic medication of OxyContin could be contributing to decreasing his respiratory status and increasing his carbon dioxide level  He may have obstructive lung disease with his history of tobacco abuse  He will need outpatient pulmonary function tests and a pulmonology evaluation

## 2017-12-07 VITALS
SYSTOLIC BLOOD PRESSURE: 142 MMHG | OXYGEN SATURATION: 95 % | HEIGHT: 71 IN | TEMPERATURE: 99.5 F | RESPIRATION RATE: 18 BRPM | HEART RATE: 76 BPM | DIASTOLIC BLOOD PRESSURE: 92 MMHG | WEIGHT: 192 LBS | BODY MASS INDEX: 26.88 KG/M2

## 2017-12-07 PROBLEM — R47.01 EXPRESSIVE APHASIA: Status: RESOLVED | Noted: 2017-12-05 | Resolved: 2017-12-07

## 2017-12-07 PROBLEM — R09.02 HYPOXIA: Status: RESOLVED | Noted: 2017-12-05 | Resolved: 2017-12-07

## 2017-12-07 PROBLEM — E83.39 HYPERPHOSPHATEMIA: Status: RESOLVED | Noted: 2017-12-06 | Resolved: 2017-12-07

## 2017-12-07 PROBLEM — G93.40 ACUTE ENCEPHALOPATHY: Status: RESOLVED | Noted: 2017-12-05 | Resolved: 2017-12-07

## 2017-12-07 LAB
ADENOVIRUS: NOT DETECTED
ALBUMIN SERPL BCP-MCNC: 3.4 G/DL (ref 3.5–5)
ALP SERPL-CCNC: 42 U/L (ref 46–116)
ALT SERPL W P-5'-P-CCNC: 26 U/L (ref 12–78)
ANION GAP SERPL CALCULATED.3IONS-SCNC: 5 MMOL/L (ref 4–13)
AST SERPL W P-5'-P-CCNC: 13 U/L (ref 5–45)
ATRIAL RATE: 98 BPM
BACTERIA UR CULT: NORMAL
BASOPHILS # BLD AUTO: 0.03 THOUSANDS/ΜL (ref 0–0.1)
BASOPHILS NFR BLD AUTO: 1 % (ref 0–1)
BILIRUB SERPL-MCNC: 0.8 MG/DL (ref 0.2–1)
BUN SERPL-MCNC: 10 MG/DL (ref 5–25)
C PNEUM DNA SPEC QL NAA+PROBE: NOT DETECTED
CALCIUM SERPL-MCNC: 8.5 MG/DL (ref 8.3–10.1)
CHLORIDE SERPL-SCNC: 105 MMOL/L (ref 100–108)
CO2 SERPL-SCNC: 32 MMOL/L (ref 21–32)
CREAT SERPL-MCNC: 0.83 MG/DL (ref 0.6–1.3)
EOSINOPHIL # BLD AUTO: 0.09 THOUSAND/ΜL (ref 0–0.61)
EOSINOPHIL NFR BLD AUTO: 1 % (ref 0–6)
ERYTHROCYTE [DISTWIDTH] IN BLOOD BY AUTOMATED COUNT: 12.9 % (ref 11.6–15.1)
FLUAV H1 RNA SPEC QL NAA+PROBE: NOT DETECTED
FLUAV H3 RNA SPEC QL NAA+PROBE: NOT DETECTED
FLUAV RNA SPEC QL NAA+PROBE: NOT DETECTED
FLUBV RNA SPEC QL NAA+PROBE: NOT DETECTED
GFR SERPL CREATININE-BSD FRML MDRD: 108 ML/MIN/1.73SQ M
GLUCOSE SERPL-MCNC: 110 MG/DL (ref 65–140)
HBOV DNA SPEC QL NAA+PROBE: NOT DETECTED
HCOV 229E RNA SPEC QL NAA+PROBE: NOT DETECTED
HCOV HKU1 RNA SPEC QL NAA+PROBE: NOT DETECTED
HCOV NL63 RNA SPEC QL NAA+PROBE: NOT DETECTED
HCOV OC43 RNA SPEC QL NAA+PROBE: NOT DETECTED
HCT VFR BLD AUTO: 42.9 % (ref 36.5–49.3)
HGB BLD-MCNC: 14.4 G/DL (ref 12–17)
HPIV1 RNA SPEC QL NAA+PROBE: NOT DETECTED
HPIV2 RNA SPEC QL NAA+PROBE: NOT DETECTED
HPIV3 RNA SPEC QL NAA+PROBE: NOT DETECTED
HPIV4 RNA SPEC QL NAA+PROBE: NOT DETECTED
LYMPHOCYTES # BLD AUTO: 1.79 THOUSANDS/ΜL (ref 0.6–4.47)
LYMPHOCYTES NFR BLD AUTO: 27 % (ref 14–44)
M PNEUMO DNA SPEC QL NAA+PROBE: NOT DETECTED
MAGNESIUM SERPL-MCNC: 2.1 MG/DL (ref 1.6–2.6)
MCH RBC QN AUTO: 29.8 PG (ref 26.8–34.3)
MCHC RBC AUTO-ENTMCNC: 33.6 G/DL (ref 31.4–37.4)
MCV RBC AUTO: 89 FL (ref 82–98)
METAPNEUMOVIRUS: NOT DETECTED
MONOCYTES # BLD AUTO: 0.43 THOUSAND/ΜL (ref 0.17–1.22)
MONOCYTES NFR BLD AUTO: 7 % (ref 4–12)
NEUTROPHILS # BLD AUTO: 4.32 THOUSANDS/ΜL (ref 1.85–7.62)
NEUTS SEG NFR BLD AUTO: 64 % (ref 43–75)
P AXIS: 40 DEGREES
PLATELET # BLD AUTO: 241 THOUSANDS/UL (ref 149–390)
PMV BLD AUTO: 9.4 FL (ref 8.9–12.7)
POTASSIUM SERPL-SCNC: 4.1 MMOL/L (ref 3.5–5.3)
PR INTERVAL: 166 MS
PROT SERPL-MCNC: 7 G/DL (ref 6.4–8.2)
QRS AXIS: -16 DEGREES
QRSD INTERVAL: 116 MS
QT INTERVAL: 372 MS
QTC INTERVAL: 474 MS
RBC # BLD AUTO: 4.83 MILLION/UL (ref 3.88–5.62)
RHINOVIRUS RNA SPEC QL NAA+PROBE: NOT DETECTED
RPR SER QL: NORMAL
RSV A RNA SPEC QL NAA+PROBE: NOT DETECTED
RSV B RNA SPEC QL NAA+PROBE: NOT DETECTED
SODIUM SERPL-SCNC: 142 MMOL/L (ref 136–145)
T WAVE AXIS: 54 DEGREES
VENTRICULAR RATE: 98 BPM
WBC # BLD AUTO: 6.66 THOUSAND/UL (ref 4.31–10.16)

## 2017-12-07 PROCEDURE — 80053 COMPREHEN METABOLIC PANEL: CPT | Performed by: INTERNAL MEDICINE

## 2017-12-07 PROCEDURE — 85025 COMPLETE CBC W/AUTO DIFF WBC: CPT | Performed by: INTERNAL MEDICINE

## 2017-12-07 PROCEDURE — 83735 ASSAY OF MAGNESIUM: CPT | Performed by: INTERNAL MEDICINE

## 2017-12-07 PROCEDURE — 94762 N-INVAS EAR/PLS OXIMTRY CONT: CPT

## 2017-12-07 RX ORDER — ERGOCALCIFEROL 1.25 MG/1
50000 CAPSULE ORAL WEEKLY
Qty: 8 CAPSULE | Refills: 0 | Status: SHIPPED | OUTPATIENT
Start: 2017-12-07 | End: 2021-01-19 | Stop reason: HOSPADM

## 2017-12-07 RX ORDER — NICOTINE 21 MG/24HR
1 PATCH, TRANSDERMAL 24 HOURS TRANSDERMAL DAILY
Qty: 28 PATCH | Refills: 0 | Status: SHIPPED | OUTPATIENT
Start: 2017-12-07 | End: 2019-11-27 | Stop reason: HOSPADM

## 2017-12-07 RX ADMIN — NICOTINE 1 PATCH: 14 PATCH, EXTENDED RELEASE TRANSDERMAL at 09:08

## 2017-12-07 RX ADMIN — ASPIRIN 81 MG CHEWABLE TABLET 81 MG: 81 TABLET CHEWABLE at 09:09

## 2017-12-07 RX ADMIN — OXYCODONE HYDROCHLORIDE 5 MG: 5 TABLET ORAL at 07:22

## 2017-12-07 RX ADMIN — BISOPROLOL FUMARATE 5 MG: 5 TABLET, COATED ORAL at 09:09

## 2017-12-07 NOTE — SOCIAL WORK
Pt is being discharged today  Set up follow up appointments for pt  was only able to get an appt with Pulmonary in 7 weeks   I did email  to get an appointment  In 1 month as requested by Md  Called Quentin Perez at Sleep study x2 to find how to set up sleep study at home  Left message awaiting her return phone call  Pt's family will give the pt a ride home  Case Management reviewed discharge planning process including the following: identifying help that is needed at home, pt's preference for discharge needs and Meds at [facility]  Reviewed with Pt that any member of the healthcare team can answer questions regarding : medications, jmportance of recognizing  Signs and symptoms of any  medical problems  Case Management also encouraged pt to follow up with all recommended appointments after discharge

## 2017-12-07 NOTE — CASE MANAGEMENT
Continued Stay Review    Date: 12-7-17      Vital Signs: /92   Pulse 76   Temp 99 5 °F (37 5 °C) (Temporal)   Resp 18   Ht 5' 11" (1 803 m)   Wt 87 1 kg (192 lb)   SpO2 95%   BMI 26 78 kg/m²     12/07/17 0712  99 5 °F (37 5 °C)  76  18  142/92  --  95 %  --  Lying     ROOM AIR SAT  95%       Medications:   Scheduled Meds:   aspirin 81 mg Oral Daily   bisoprolol 5 mg Oral Daily   enoxaparin 40 mg Subcutaneous Q24H   nicotine 1 patch Transdermal Daily     Continuous Infusions:    PRN Meds:   acetaminophen    ondansetron    oxyCODONE    Abnormal Labs/Diagnostic Results:      MRI  BRAIN  NO ACUTE FINDING  MRA MRV BRAIN  NO ACUTE FINDING  Age/Sex: 36 y o  male     Assessment/Plan:      CONSULT NEUROLOGY  RECOMMEND A WORKUP FOR TOXIC ENCEPHALOPATHY    PT OT AND SPEECH EVALS COMPLETED AND WNL       assess ambulatory pulse ox  PENDING     Discharge Plan: HOME

## 2017-12-07 NOTE — DISCHARGE INSTR - APPOINTMENTS
Pt has an appt with Dr Eli Rosas (pulmonary ) on Tuesday January 23, 2018 at 1:40 pm in the [place] office

## 2017-12-07 NOTE — DISCHARGE SUMMARY
Discharge Summary - [facility]    Patient Information: Ghazala Proctor 36 y o  male MRN: [id number]  Unit/Bed#: 737-14 Encounter: [id number]    Discharging Physician / Practitioner: Jemma Black DO  PCP: Alexi Blunt DO  Admission Date: 12/5/2017  Discharge Date: 12/07/17    Reason for Admission:     Admitting HPI as noted below:      The patient is a 36year-old male presented to the emergency department with the complaints of confusion and word-finding difficulties  The patient was in his usual state of health this morning  This afternoon, the patient's family noticed that the patient was acting confused  He was talking about people and events that did not make sense  He had difficulty finding what words he wanted to say  Over last several weeks, the patient's wife has noticed that he has been falling asleep frequently  He has been experiencing brief unresponsive episodes  He has also been experiencing episodes of weakness in his bilateral hands, in which he uncontrollably drops an object that he is grasping  He had similar symptoms approximately 2 years ago and had an extensive hospitalization and workup at [address] [place] [address]  No definitive diagnosis was found at that time    Currently, he is falling asleep frequent during my history and physical exam       Discharge Diagnoses:     Principal Problem (Resolved):    Acute encephalopathy  Active Problems:    Tobacco abuse    Opiate dependence, continuous (HCC)    Essential hypertension    Chronic back pain  Resolved Problems:    Hypoxia    Expressive aphasia    Hyperphosphatemia    Consultations During Hospital Stay:  · Telemedicine neurology    Procedures Performed:     · None    Significant Findings / Test Results:     · Transient hypoxia and hypercapnia  · Overnight pulse oximetry/sleep apnea screen was negative for any hypoxia on room air, please note the patient did not sleep much at all overnight during the screening test     Test Results Pending at Discharge (will require follow up): · None     Outpatient Tests Requested:  · Sleep study  · Pulmonary function tests  · Repeat vitamin D level in 8 week's    Complications:  None    Hospital Course:     Bhargav Markham is a 36 y o  male patient who originally presented to the hospital on 12/5/2017 due to HPI as noted above, patient had gradual in persistent improvement in mentation, patient currently functioning at baseline with no other acute episodes occurring during his hospital stay  Patient was seen and evaluated by tele neurology consulted, recommendation was for MRI and MRA of the brain  Patient also had an echocardiogram performed which showed normal ejection fraction of 65%  Carotid ultrasound negative for any significant stenosis  Patient has history of hypertension, has a history of noncompliance with blood pressure medications, patient was restarted on his home bisoprolol with improvement in blood pressure, patient did not have any significant uncontrolled hypertension during this hospital stay  Patient advised to stop smoking cigarettes, patient prescribed nicotine patch at discharge  No clear etiology for patient's presentation was identified, he is recommended to follow up closely with his primary care physician, recommendations to follow up with pain management as long-term narcotic therapy is not recommended  This recommendation was specifically discussed with the patient at bedside on day of discharge  Patient was given information to follow up with [facility] management  Condition at Discharge: good     Discharge Day Visit / Exam:     Subjective:  Patient is mentating at baseline, he has no acute complaints    Vitals: Blood Pressure: 142/92 (12/07/17 0712)  Pulse: 76 (12/07/17 0712)  Temperature: 99 5 °F (37 5 °C) (12/07/17 0712)  Temp Source: Temporal (12/07/17 4904)  Respirations: 18 (12/07/17 0712)  Height: 5' 11" (180 3 cm) (12/05/17 2355)  Weight - Scale: 87 1 kg (192 lb) (12/07/17 0600)  SpO2: 95 % (12/07/17 3357)  Exam:   Physical Exam   Constitutional: He is oriented to person, place, and time  He appears well-nourished  No distress  Pulmonary/Chest: Effort normal    Neurological: He is alert and oriented to person, place, and time  No cranial nerve deficit  Coordination normal    Skin: He is not diaphoretic  Psychiatric: He has a normal mood and affect  His behavior is normal  Judgment and thought content normal    Nursing note and vitals reviewed  Discharge instructions/Information to patient and family:   See after visit summary for information provided to patient and family  Provisions for Follow-Up Care:  See after visit summary for information related to follow-up care and any pertinent home health orders  Disposition:     Home    For Discharges to [facility]:   · Not Applicable to this Patient - Not Applicable to this Patient    Planned Readmission:  No     Discharge Statement:  I spent 35 minutes discharging the patient  This time was spent on the day of discharge  I had direct contact with the patient on the day of discharge  Greater than 50% of the total time was spent examining patient, answering all patient questions, arranging and discussing plan of care with patient as well as directly providing post-discharge instructions  Additional time then spent on discharge activities  Discharge Medications:  See after visit summary for reconciled discharge medications provided to patient and family        ** Please Note: This note has been constructed using a voice recognition system **

## 2017-12-07 NOTE — NURSING NOTE
Patient discharged to home in stable condition  Discharge paperwork explained to patient with patient stating verbal understanding of same  Patient chose to walk to the hospital exit

## 2017-12-07 NOTE — PLAN OF CARE

## 2017-12-08 LAB
B BURGDOR IGG SER IA-ACNC: 0.1
B BURGDOR IGM SER IA-ACNC: 0.16
HIV 1+2 AB+HIV1 P24 AG SERPL QL IA: NORMAL

## 2018-01-03 ENCOUNTER — TRANSCRIBE ORDERS (OUTPATIENT)
Dept: SLEEP CENTER | Facility: CLINIC | Age: 43
End: 2018-01-03

## 2018-01-15 ENCOUNTER — APPOINTMENT (OUTPATIENT)
Dept: LAB | Facility: MEDICAL CENTER | Age: 43
End: 2018-01-15
Payer: COMMERCIAL

## 2018-01-15 ENCOUNTER — TRANSCRIBE ORDERS (OUTPATIENT)
Dept: RADIOLOGY | Facility: MEDICAL CENTER | Age: 43
End: 2018-01-15

## 2018-01-15 DIAGNOSIS — Z79.899 LONG TERM USE OF DRUG: Primary | ICD-10-CM

## 2018-01-15 PROCEDURE — 80307 DRUG TEST PRSMV CHEM ANLYZR: CPT

## 2018-01-19 ENCOUNTER — GENERIC CONVERSION - ENCOUNTER (OUTPATIENT)
Dept: OTHER | Facility: OTHER | Age: 43
End: 2018-01-19

## 2018-01-19 LAB
AMPHETAMINES UR QL SCN: NEGATIVE NG/ML
BARBITURATES UR QL SCN: NEGATIVE NG/ML
BENZODIAZ UR QL SCN: NEGATIVE
BZE UR QL SCN: NEGATIVE NG/ML
CANNABINOIDS UR QL SCN: NEGATIVE NG/ML
METHADONE UR QL SCN: NEGATIVE NG/ML
OPIATES UR QL: NEGATIVE
PCP UR QL: NEGATIVE NG/ML
PROPOXYPH UR QL: NEGATIVE NG/ML

## 2018-01-27 ENCOUNTER — APPOINTMENT (OUTPATIENT)
Dept: RADIOLOGY | Facility: CLINIC | Age: 43
End: 2018-01-27
Payer: COMMERCIAL

## 2018-01-27 ENCOUNTER — OFFICE VISIT (OUTPATIENT)
Dept: URGENT CARE | Facility: CLINIC | Age: 43
End: 2018-01-27
Payer: COMMERCIAL

## 2018-01-27 VITALS
DIASTOLIC BLOOD PRESSURE: 88 MMHG | SYSTOLIC BLOOD PRESSURE: 128 MMHG | OXYGEN SATURATION: 98 % | TEMPERATURE: 98 F | RESPIRATION RATE: 14 BRPM | HEART RATE: 87 BPM

## 2018-01-27 DIAGNOSIS — R11.0 NAUSEA: Primary | ICD-10-CM

## 2018-01-27 PROCEDURE — 99213 OFFICE O/P EST LOW 20 MIN: CPT | Performed by: NURSE PRACTITIONER

## 2018-01-27 PROCEDURE — 74018 RADEX ABDOMEN 1 VIEW: CPT

## 2018-01-27 RX ORDER — PROCHLORPERAZINE MALEATE 5 MG/1
5 TABLET ORAL EVERY 6 HOURS PRN
Qty: 15 TABLET | Refills: 0 | Status: SHIPPED | OUTPATIENT
Start: 2018-01-27 | End: 2019-11-27 | Stop reason: HOSPADM

## 2018-01-27 NOTE — PROGRESS NOTES
Assessment/Plan:      Diagnoses and all orders for this visit:    Nausea  -     X-ray abdomen 1 view          Subjective:     Patient ID: Heather Chappell is a 36 y o  male  C/O nausea and intermittent vomiting  First started in December after being placed on Arlon Lavon for uncontrolled hypertension  He has had several incidences of vomiting and nausea that seem to improve when he Discontinues the Arlon Lavon for a few days  Never mentioned this to PCP  This episode started 3 days ago with nausea  He vomited once yesterday and once this morning  He reports minimal nausea at the time of this visit  Incidentally, he stopped the Arlon Lavon again a couple days ago  Denies abdominal pain  Vomiting          Review of Systems   Constitutional: Negative  HENT: Negative  Eyes: Negative  Respiratory: Negative  Cardiovascular: Negative  Gastrointestinal: Positive for nausea and vomiting  Endocrine: Negative  Musculoskeletal: Negative  Skin: Negative  Psychiatric/Behavioral: Negative  Objective:  /88   Pulse 87   Temp 98 °F (36 7 °C)   Resp 14   SpO2 98%      Physical Exam   Constitutional: He is oriented to person, place, and time  He appears well-developed and well-nourished  HENT:   Head: Normocephalic and atraumatic  Cardiovascular: Normal rate and regular rhythm  Pulmonary/Chest: Breath sounds normal    Abdominal: Soft  Bowel sounds are normal    Neurological: He is alert and oriented to person, place, and time  He has normal reflexes  Skin: Skin is warm  No rash noted  Psychiatric: He has a normal mood and affect  Nursing note and vitals reviewed  Patient Instructions   X-ray of abdomen preliminarily reviewed  No acute process noted  I advised him to contact PCP and make him aware of possible nausea from Arlon Lavon  If symptoms persist despite stopping Zybeta, recommend GI consult  Pt and wife verbalized understanding

## 2018-01-27 NOTE — PATIENT INSTRUCTIONS
X-ray of abdomen preliminarily reviewed  No acute process noted  I advised him to contact PCP and make him aware of possible nausea from Dacosta Fake  If symptoms persist despite stopping Zybeta, recommend GI consult  Pt and wife verbalized understanding

## 2018-02-08 ENCOUNTER — TRANSCRIBE ORDERS (OUTPATIENT)
Dept: LAB | Facility: MEDICAL CENTER | Age: 43
End: 2018-02-08

## 2018-02-08 ENCOUNTER — APPOINTMENT (OUTPATIENT)
Dept: LAB | Facility: MEDICAL CENTER | Age: 43
End: 2018-02-08
Payer: COMMERCIAL

## 2018-02-08 DIAGNOSIS — F11.20: ICD-10-CM

## 2018-02-08 DIAGNOSIS — F11.20: Primary | ICD-10-CM

## 2018-02-08 DIAGNOSIS — G89.4 CHRONIC PAIN SYNDROME: Primary | ICD-10-CM

## 2018-02-08 DIAGNOSIS — G89.4 CHRONIC PAIN DISORDER: ICD-10-CM

## 2018-02-08 PROCEDURE — 80307 DRUG TEST PRSMV CHEM ANLYZR: CPT | Performed by: FAMILY MEDICINE

## 2018-02-08 PROCEDURE — G0480 DRUG TEST DEF 1-7 CLASSES: HCPCS

## 2018-02-08 PROCEDURE — 80365 DRUG SCREENING OXYCODONE: CPT

## 2018-02-17 LAB
AMPHETAMINES UR QL SCN: NEGATIVE NG/ML
BARBITURATES UR QL SCN: NEGATIVE NG/ML
BENZODIAZ UR QL SCN: POSITIVE
BZE UR QL SCN: NEGATIVE NG/ML
CANNABINOIDS UR QL SCN: NEGATIVE NG/ML
METHADONE UR QL SCN: NEGATIVE NG/ML
OPIATES UR QL: NEGATIVE
PCP UR QL: NEGATIVE NG/ML
PROPOXYPH UR QL: NEGATIVE NG/ML

## 2018-02-19 LAB
OXYCODONE UR QL CFM: ABNORMAL NG/ML
OXYCODONE+OXYMORPHONE SERPLBLD QL SCN: POSITIVE
OXYCODONE+OXYMORPHONE UR QL SCN: POSITIVE
OXYMORPHONE UR CFM-MCNC: 4950 NG/ML
OXYMORPHONE UR QL CFM: POSITIVE

## 2018-04-17 ENCOUNTER — TRANSCRIBE ORDERS (OUTPATIENT)
Dept: LAB | Facility: CLINIC | Age: 43
End: 2018-04-17

## 2018-04-17 ENCOUNTER — APPOINTMENT (OUTPATIENT)
Dept: LAB | Facility: CLINIC | Age: 43
End: 2018-04-17
Payer: COMMERCIAL

## 2018-04-17 DIAGNOSIS — I10 HYPERTENSION, UNSPECIFIED TYPE: Primary | ICD-10-CM

## 2018-04-17 DIAGNOSIS — I10 HYPERTENSION, UNSPECIFIED TYPE: ICD-10-CM

## 2018-04-17 LAB
ANION GAP SERPL CALCULATED.3IONS-SCNC: 7 MMOL/L (ref 4–13)
BUN SERPL-MCNC: 34 MG/DL (ref 5–25)
CALCIUM SERPL-MCNC: 8.8 MG/DL (ref 8.3–10.1)
CHLORIDE SERPL-SCNC: 107 MMOL/L (ref 100–108)
CO2 SERPL-SCNC: 24 MMOL/L (ref 21–32)
CREAT SERPL-MCNC: 1 MG/DL (ref 0.6–1.3)
GFR SERPL CREATININE-BSD FRML MDRD: 92 ML/MIN/1.73SQ M
GLUCOSE P FAST SERPL-MCNC: 115 MG/DL (ref 65–99)
POTASSIUM SERPL-SCNC: 4 MMOL/L (ref 3.5–5.3)
SODIUM SERPL-SCNC: 138 MMOL/L (ref 136–145)

## 2018-04-17 PROCEDURE — 36415 COLL VENOUS BLD VENIPUNCTURE: CPT

## 2018-04-17 PROCEDURE — 80048 BASIC METABOLIC PNL TOTAL CA: CPT

## 2019-04-14 ENCOUNTER — OFFICE VISIT (OUTPATIENT)
Dept: URGENT CARE | Facility: CLINIC | Age: 44
End: 2019-04-14
Payer: COMMERCIAL

## 2019-04-14 VITALS
SYSTOLIC BLOOD PRESSURE: 142 MMHG | OXYGEN SATURATION: 99 % | DIASTOLIC BLOOD PRESSURE: 80 MMHG | RESPIRATION RATE: 20 BRPM | TEMPERATURE: 98.9 F | HEART RATE: 110 BPM

## 2019-04-14 DIAGNOSIS — L08.9 LACERATION OF LEFT THUMB WITH INFECTION, SUBSEQUENT ENCOUNTER: Primary | ICD-10-CM

## 2019-04-14 DIAGNOSIS — S61.012D LACERATION OF LEFT THUMB WITH INFECTION, SUBSEQUENT ENCOUNTER: Primary | ICD-10-CM

## 2019-04-14 PROCEDURE — 99203 OFFICE O/P NEW LOW 30 MIN: CPT | Performed by: NURSE PRACTITIONER

## 2019-04-14 RX ORDER — CEPHALEXIN 500 MG/1
500 CAPSULE ORAL EVERY 8 HOURS SCHEDULED
Qty: 30 CAPSULE | Refills: 0 | Status: SHIPPED | OUTPATIENT
Start: 2019-04-14 | End: 2019-04-24

## 2019-04-14 RX ORDER — LISINOPRIL 10 MG/1
10 TABLET ORAL DAILY
COMMUNITY
End: 2021-01-19 | Stop reason: HOSPADM

## 2019-11-24 ENCOUNTER — APPOINTMENT (EMERGENCY)
Dept: CT IMAGING | Facility: HOSPITAL | Age: 44
End: 2019-11-24
Payer: COMMERCIAL

## 2019-11-24 ENCOUNTER — HOSPITAL ENCOUNTER (OUTPATIENT)
Facility: HOSPITAL | Age: 44
Setting detail: OBSERVATION
End: 2019-11-25
Attending: EMERGENCY MEDICINE | Admitting: FAMILY MEDICINE
Payer: COMMERCIAL

## 2019-11-24 ENCOUNTER — APPOINTMENT (EMERGENCY)
Dept: RADIOLOGY | Facility: HOSPITAL | Age: 44
End: 2019-11-24
Payer: COMMERCIAL

## 2019-11-24 DIAGNOSIS — T50.902A INTENTIONAL DRUG OVERDOSE, INITIAL ENCOUNTER (HCC): Primary | ICD-10-CM

## 2019-11-24 LAB
ALBUMIN SERPL BCP-MCNC: 3.7 G/DL (ref 3.5–5)
ALP SERPL-CCNC: 56 U/L (ref 46–116)
ALT SERPL W P-5'-P-CCNC: 15 U/L (ref 12–78)
AMMONIA PLAS-SCNC: 23 UMOL/L (ref 11–35)
AMPHETAMINES SERPL QL SCN: NEGATIVE
ANION GAP SERPL CALCULATED.3IONS-SCNC: 11 MMOL/L (ref 4–13)
APAP SERPL-MCNC: <2 UG/ML (ref 10–20)
APTT PPP: 24 SECONDS (ref 23–37)
AST SERPL W P-5'-P-CCNC: 19 U/L (ref 5–45)
BACTERIA UR QL AUTO: ABNORMAL /HPF
BARBITURATES UR QL: NEGATIVE
BASE EXCESS BLDA CALC-SCNC: -8.6 MMOL/L
BASOPHILS # BLD AUTO: 0.04 THOUSANDS/ΜL (ref 0–0.1)
BASOPHILS NFR BLD AUTO: 1 % (ref 0–1)
BENZODIAZ UR QL: POSITIVE
BILIRUB SERPL-MCNC: 0.4 MG/DL (ref 0.2–1)
BILIRUB UR QL STRIP: NEGATIVE
BUN SERPL-MCNC: 17 MG/DL (ref 5–25)
CALCIUM SERPL-MCNC: 7.9 MG/DL (ref 8.3–10.1)
CHLORIDE SERPL-SCNC: 106 MMOL/L (ref 100–108)
CK MB SERPL-MCNC: 1.2 % (ref 0–2.5)
CK MB SERPL-MCNC: 2.4 NG/ML (ref 0–5)
CK SERPL-CCNC: 201 U/L (ref 39–308)
CLARITY UR: CLEAR
CO2 SERPL-SCNC: 23 MMOL/L (ref 21–32)
COCAINE UR QL: NEGATIVE
COLOR UR: YELLOW
CREAT SERPL-MCNC: 1.21 MG/DL (ref 0.6–1.3)
EOSINOPHIL # BLD AUTO: 0.09 THOUSAND/ΜL (ref 0–0.61)
EOSINOPHIL NFR BLD AUTO: 2 % (ref 0–6)
ERYTHROCYTE [DISTWIDTH] IN BLOOD BY AUTOMATED COUNT: 13.2 % (ref 11.6–15.1)
ETHANOL SERPL-MCNC: <3 MG/DL (ref 0–3)
GFR SERPL CREATININE-BSD FRML MDRD: 72 ML/MIN/1.73SQ M
GLUCOSE SERPL-MCNC: 74 MG/DL (ref 65–140)
GLUCOSE SERPL-MCNC: 87 MG/DL (ref 65–140)
GLUCOSE UR STRIP-MCNC: NEGATIVE MG/DL
HCO3 BLDA-SCNC: 16.9 MMOL/L (ref 22–28)
HCT VFR BLD AUTO: 41.5 % (ref 36.5–49.3)
HGB BLD-MCNC: 13 G/DL (ref 12–17)
HGB UR QL STRIP.AUTO: NEGATIVE
IMM GRANULOCYTES # BLD AUTO: 0.03 THOUSAND/UL (ref 0–0.2)
IMM GRANULOCYTES NFR BLD AUTO: 1 % (ref 0–2)
INR PPP: 1.07 (ref 0.84–1.19)
KETONES UR STRIP-MCNC: NEGATIVE MG/DL
LACTATE SERPL-SCNC: 1.7 MMOL/L (ref 0.5–2)
LEUKOCYTE ESTERASE UR QL STRIP: ABNORMAL
LYMPHOCYTES # BLD AUTO: 1.21 THOUSANDS/ΜL (ref 0.6–4.47)
LYMPHOCYTES NFR BLD AUTO: 21 % (ref 14–44)
MAGNESIUM SERPL-MCNC: 1.7 MG/DL (ref 1.6–2.6)
MCH RBC QN AUTO: 29.7 PG (ref 26.8–34.3)
MCHC RBC AUTO-ENTMCNC: 31.3 G/DL (ref 31.4–37.4)
MCV RBC AUTO: 95 FL (ref 82–98)
METHADONE UR QL: NEGATIVE
MONOCYTES # BLD AUTO: 0.33 THOUSAND/ΜL (ref 0.17–1.22)
MONOCYTES NFR BLD AUTO: 6 % (ref 4–12)
NEUTROPHILS # BLD AUTO: 3.99 THOUSANDS/ΜL (ref 1.85–7.62)
NEUTS SEG NFR BLD AUTO: 69 % (ref 43–75)
NITRITE UR QL STRIP: NEGATIVE
NON-SQ EPI CELLS URNS QL MICRO: ABNORMAL /HPF
NRBC BLD AUTO-RTO: 0 /100 WBCS
O2 CT BLDA-SCNC: 17.7 ML/DL (ref 16–23)
OPIATES UR QL SCN: POSITIVE
OXYHGB MFR BLDA: 93.7 % (ref 94–97)
PCO2 BLDA: 35.3 MM HG (ref 36–44)
PCP UR QL: POSITIVE
PH BLDA: 7.3 [PH] (ref 7.35–7.45)
PH BLDV: 7.28 [PH] (ref 7.3–7.4)
PH UR STRIP.AUTO: 6 [PH]
PLATELET # BLD AUTO: 252 THOUSANDS/UL (ref 149–390)
PMV BLD AUTO: 9 FL (ref 8.9–12.7)
PO2 BLDA: 83 MM HG (ref 75–129)
POTASSIUM SERPL-SCNC: 3.8 MMOL/L (ref 3.5–5.3)
PROT SERPL-MCNC: 7.3 G/DL (ref 6.4–8.2)
PROT UR STRIP-MCNC: ABNORMAL MG/DL
PROTHROMBIN TIME: 14 SECONDS (ref 11.6–14.5)
RBC # BLD AUTO: 4.37 MILLION/UL (ref 3.88–5.62)
RBC #/AREA URNS AUTO: ABNORMAL /HPF
SALICYLATES SERPL-MCNC: <3 MG/DL (ref 3–20)
SODIUM SERPL-SCNC: 140 MMOL/L (ref 136–145)
SP GR UR STRIP.AUTO: >=1.03 (ref 1–1.03)
SPECIMEN SOURCE: ABNORMAL
THC UR QL: NEGATIVE
TROPONIN I SERPL-MCNC: <0.02 NG/ML
TSH SERPL DL<=0.05 MIU/L-ACNC: 0.94 UIU/ML (ref 0.36–3.74)
UROBILINOGEN UR QL STRIP.AUTO: 0.2 E.U./DL
WBC # BLD AUTO: 5.69 THOUSAND/UL (ref 4.31–10.16)
WBC #/AREA URNS AUTO: ABNORMAL /HPF

## 2019-11-24 PROCEDURE — 83735 ASSAY OF MAGNESIUM: CPT | Performed by: EMERGENCY MEDICINE

## 2019-11-24 PROCEDURE — 99285 EMERGENCY DEPT VISIT HI MDM: CPT | Performed by: EMERGENCY MEDICINE

## 2019-11-24 PROCEDURE — 82553 CREATINE MB FRACTION: CPT | Performed by: EMERGENCY MEDICINE

## 2019-11-24 PROCEDURE — 82550 ASSAY OF CK (CPK): CPT | Performed by: EMERGENCY MEDICINE

## 2019-11-24 PROCEDURE — 99285 EMERGENCY DEPT VISIT HI MDM: CPT

## 2019-11-24 PROCEDURE — 96361 HYDRATE IV INFUSION ADD-ON: CPT

## 2019-11-24 PROCEDURE — 84443 ASSAY THYROID STIM HORMONE: CPT | Performed by: EMERGENCY MEDICINE

## 2019-11-24 PROCEDURE — 71045 X-RAY EXAM CHEST 1 VIEW: CPT

## 2019-11-24 PROCEDURE — 80320 DRUG SCREEN QUANTALCOHOLS: CPT | Performed by: EMERGENCY MEDICINE

## 2019-11-24 PROCEDURE — 85610 PROTHROMBIN TIME: CPT | Performed by: EMERGENCY MEDICINE

## 2019-11-24 PROCEDURE — 93005 ELECTROCARDIOGRAM TRACING: CPT

## 2019-11-24 PROCEDURE — 81001 URINALYSIS AUTO W/SCOPE: CPT | Performed by: EMERGENCY MEDICINE

## 2019-11-24 PROCEDURE — G0425 INPT/ED TELECONSULT30: HCPCS | Performed by: EMERGENCY MEDICINE

## 2019-11-24 PROCEDURE — 36600 WITHDRAWAL OF ARTERIAL BLOOD: CPT

## 2019-11-24 PROCEDURE — 82800 BLOOD PH: CPT | Performed by: EMERGENCY MEDICINE

## 2019-11-24 PROCEDURE — 84484 ASSAY OF TROPONIN QUANT: CPT | Performed by: EMERGENCY MEDICINE

## 2019-11-24 PROCEDURE — 82948 REAGENT STRIP/BLOOD GLUCOSE: CPT

## 2019-11-24 PROCEDURE — 85730 THROMBOPLASTIN TIME PARTIAL: CPT | Performed by: EMERGENCY MEDICINE

## 2019-11-24 PROCEDURE — 82140 ASSAY OF AMMONIA: CPT | Performed by: EMERGENCY MEDICINE

## 2019-11-24 PROCEDURE — 96365 THER/PROPH/DIAG IV INF INIT: CPT

## 2019-11-24 PROCEDURE — 80053 COMPREHEN METABOLIC PANEL: CPT | Performed by: EMERGENCY MEDICINE

## 2019-11-24 PROCEDURE — 80307 DRUG TEST PRSMV CHEM ANLYZR: CPT | Performed by: EMERGENCY MEDICINE

## 2019-11-24 PROCEDURE — 85025 COMPLETE CBC W/AUTO DIFF WBC: CPT | Performed by: EMERGENCY MEDICINE

## 2019-11-24 PROCEDURE — 82805 BLOOD GASES W/O2 SATURATION: CPT | Performed by: EMERGENCY MEDICINE

## 2019-11-24 PROCEDURE — 70450 CT HEAD/BRAIN W/O DYE: CPT

## 2019-11-24 PROCEDURE — 83605 ASSAY OF LACTIC ACID: CPT | Performed by: EMERGENCY MEDICINE

## 2019-11-24 PROCEDURE — 80329 ANALGESICS NON-OPIOID 1 OR 2: CPT | Performed by: EMERGENCY MEDICINE

## 2019-11-24 PROCEDURE — 36415 COLL VENOUS BLD VENIPUNCTURE: CPT | Performed by: EMERGENCY MEDICINE

## 2019-11-24 PROCEDURE — 99219 PR INITIAL OBSERVATION CARE/DAY 50 MINUTES: CPT | Performed by: PHYSICIAN ASSISTANT

## 2019-11-24 RX ORDER — BISOPROLOL FUMARATE 5 MG/1
5 TABLET ORAL DAILY
Status: DISCONTINUED | OUTPATIENT
Start: 2019-11-25 | End: 2019-11-25 | Stop reason: HOSPADM

## 2019-11-24 RX ORDER — MIDAZOLAM HYDROCHLORIDE 1 MG/ML
3 INJECTION INTRAMUSCULAR; INTRAVENOUS ONCE
Status: COMPLETED | OUTPATIENT
Start: 2019-11-24 | End: 2019-11-24

## 2019-11-24 RX ORDER — LISINOPRIL 10 MG/1
10 TABLET ORAL DAILY
Status: DISCONTINUED | OUTPATIENT
Start: 2019-11-25 | End: 2019-11-25 | Stop reason: HOSPADM

## 2019-11-24 RX ORDER — MAGNESIUM SULFATE HEPTAHYDRATE 40 MG/ML
2 INJECTION, SOLUTION INTRAVENOUS ONCE
Status: COMPLETED | OUTPATIENT
Start: 2019-11-24 | End: 2019-11-24

## 2019-11-24 RX ORDER — ONDANSETRON 2 MG/ML
4 INJECTION INTRAMUSCULAR; INTRAVENOUS EVERY 6 HOURS PRN
Status: DISCONTINUED | OUTPATIENT
Start: 2019-11-24 | End: 2019-11-25 | Stop reason: HOSPADM

## 2019-11-24 RX ORDER — NALOXONE HYDROCHLORIDE 1 MG/ML
1 INJECTION PARENTERAL ONCE
Status: COMPLETED | OUTPATIENT
Start: 2019-11-24 | End: 2019-11-24

## 2019-11-24 RX ORDER — SODIUM CHLORIDE 9 MG/ML
100 INJECTION, SOLUTION INTRAVENOUS CONTINUOUS
Status: DISCONTINUED | OUTPATIENT
Start: 2019-11-24 | End: 2019-11-25 | Stop reason: HOSPADM

## 2019-11-24 RX ORDER — NICOTINE 21 MG/24HR
1 PATCH, TRANSDERMAL 24 HOURS TRANSDERMAL DAILY
Status: DISCONTINUED | OUTPATIENT
Start: 2019-11-25 | End: 2019-11-25 | Stop reason: HOSPADM

## 2019-11-24 RX ADMIN — SODIUM CHLORIDE 100 ML/HR: 0.9 INJECTION, SOLUTION INTRAVENOUS at 20:30

## 2019-11-24 RX ADMIN — CALCIUM GLUCONATE 2 G: 98 INJECTION, SOLUTION INTRAVENOUS at 18:03

## 2019-11-24 RX ADMIN — SODIUM CHLORIDE 1000 ML: 0.9 INJECTION, SOLUTION INTRAVENOUS at 16:39

## 2019-11-24 RX ADMIN — SODIUM CHLORIDE 1000 ML: 0.9 INJECTION, SOLUTION INTRAVENOUS at 13:51

## 2019-11-24 RX ADMIN — MAGNESIUM SULFATE HEPTAHYDRATE 2 G: 40 INJECTION, SOLUTION INTRAVENOUS at 16:40

## 2019-11-24 NOTE — CONSULTS
TeleConsultation - Medical Toxicology  Julius Lazo 38 y o  male MRN: [id number]  Unit/Bed#: TR15 Encounter: [id number]      REQUIRED DOCUMENTATION:     1  This service was provided via Telemedicine  2  Provider located at [facility]  3  TeleMed provider: Cathy Banda DO   4  Identify all parties in room with patient during tele consult:  Francisco Mckeon RN, Philippe Koyanagi (mother-in-law)  5  After connecting through [facility], patient was identified by name and date of birth and assistant checked wristband  Patient was then informed that this was a Telemedicine visit and that the exam was being conducted confidentially over secure lines  My office door was closed  No one else was in the room  Patient acknowledged consent and understanding of privacy and security of the Telemedicine visit, and gave us permission to have the assistant stay in the room in order to assist with the history and to conduct the exam   I informed the patient that I have reviewed their record in Epic and presented the opportunity for them to ask any questions regarding the visit today  The patient agreed to participate  Consultation - Medical Toxicology  Julius Lazo 38 y o  male MRN: [id number]  Unit/Bed#: TR15 Encounter: [id number]      Reason for Consult / Principal Problem: overdose  Inpatient consult to Toxicology  Consult performed by: Cathy Banda DO  Consult ordered by: Lashae Sotelo MD        11/24/19      ASSESSMENT:  38year-old male with acute, intentional overdose on dextromethorphan, diphenhydramine, ibuprofen and loperamide  1  Acute, intentional overdose on dextromethorphan, diphenhydramine, ibuprofen and loperamide  2  Anticholinergic toxicity, improved  3  QT interval prolongation  4  QRS prolongation  5  Hypocalcemia  6  Hypomagnesemia  7  Dehydration with possible renal insufficiency   8  Substance abuse    Results for Zo Leal (MRN [id number]) as of 11/24/2019 16:22   Ref   Range 12/6/2017 05:01 12/7/2017 06:18 4/17/2018 07:35 11/24/2019 13:48   Creatinine Latest Ref Range: 0 60 - 1 30 mg/dL 0 81 0 83 1 00 1 21       RECOMMENDATIONS:  Please admit patient overnight and continue supportive care, telemetry for QT monitoring, electrolyte optimization, and renal monitoring  Loperamide and diphenhydramine are both QT prolonging agents and are likely contributory to his prolonged QT interval, as well as his depressed calcium and magnesium  His mental status has normalized and he is no longer anticholinergic with delirium, has not signs of dextromethorphan toxicity and has no toxicity associated with ibuprofen as there were no gastrointestinal symptoms  Urine drug screen positive for PCP is likely a result of dextromethorphan  Positive benzodiazepine result may be from the prehospital midazolam  However, opiate result is likely indicative of exposure to heroin, codeine or morphine  Provided QT interval is less than 500 ms in the morning after electrolyte optimization, and patient is asymptomatic with normal vital signs, mental status and ambulatory ability, he may be cleared from a toxicology perspective  QRS prolongation is likely a result of the diphenhydramine but does appear consistent with prior ECGS  HE has abused these medications in the past so it is unknown if this is his baseline QRS  Nonetheless, telemetry monitoring is recommended  Although he denies suicidal ideation, his history is concerning for suicide attempt and psychiatric evaluation is recommended  For further questions, please call [facility]  Service or Patient Access Center to reach the medical  on call  QT prolongation recommendations:  Treatment of prolonged QT interval primarily includes optimization of electrolytes (magnesium, potassium, calcium)  A concerning corrected QT interval in the toxicological setting is > 500ms  This may increase risk of torsades de pointes  Bradycardia further increases the risk while tachycardia is generally protective  Should torsades de pointes occur, electrical cardioversion should occur if hemodynamically stable, whereas stable patients may be treated with magnesium, lidocaine and overdrive pacing if stable  Recommended magnesium administration is 2 grams IV over 5 minutes, may repeat if necessary, and followed by 0 5-1 gram/hour infusion  Overdrive pacing is started at 130-150 beats per minute and then decreased as tolerated toward 100-120 beats per minute  Pharmacological alternatives to electrical pacing may include epinephrine, dobutamine or isoproterenol  Avoid class 1a and 3 antidysrhythmics  QRS prolongation:  For QRS widening greater than 120ms, please administer 1-2 ampule (50-100mEq) bolus of sodium bicarbonate and recheck ECG, VBG and BMP  Repeat sodium bicarbonate administration as necessary for QRS >120ms  Goal QRS is less than 120ms, Na is 145-155 and goal pH is 7 45-7  55  Bicarbonate drips are usually unnecessary and of limited therapeutic use  Alternative methods to achieve these parameters are hyperventilation if patient is ventilated, hypertonic saline administration, and lidocaine  QRS > 100ms is associated with seizures and QRS > 160ms is associated with dysrhythmias  Of note, one third of population may have incomplete right bundle branch block  Benzodiazepines are indicated for seizures in this setting  For dysrhythmias, sodium bicarbonate is indicated along with lidocaine as the antidysrhythmic of choice  HPI: Tyler Mosquera is a 38y o  year old male who presents with agitated delirium consistent with anticholinergic toxicity as described by ED physician  Patient overdosed on ibuprofen, dextromethorphan, loperamide, and diphenhydramine  Mental status improved to baseline in ED but he remained mildly tachycardic with prolonged QT interval  Electrolytes were abnormal and creatinine appeared about 0 4 above baseline  ROS provided by patient and ED physician   Review of Systems   Constitutional: Positive for activity change  HENT: Negative  Eyes: Negative  Respiratory: Negative  Cardiovascular: Positive for palpitations  Gastrointestinal: Negative  Genitourinary: Negative  Musculoskeletal: Negative  Neurological: Positive for speech difficulty  Psychiatric/Behavioral: Positive for agitation, confusion, decreased concentration and hallucinations  Negative for self-injury and suicidal ideas  The patient is nervous/anxious and is hyperactive  Historical Information   Past Medical History:   Diagnosis Date    Biceps tendon tear     Carpal tunnel syndrome     Chronic pain     back    Hypertension      Past Surgical History:   Procedure Laterality Date    CARPAL TUNNEL RELEASE      ROTATOR CUFF REPAIR      ULNAR NERVE REPAIR       Social History   Social History     Substance and Sexual Activity   Alcohol Use No     Social History     Substance and Sexual Activity   Drug Use No    Comment: pt denies all substances      Social History     Tobacco Use   Smoking Status Current Every Day Smoker    Packs/day: 1 00    Years: 25 00    Pack years: 25 00   Smokeless Tobacco Never Used     History reviewed  No pertinent family history  Prior to Admission medications    Medication Sig Start Date End Date Taking?  Authorizing Provider   ALPRAZolam [place]) 0 5 mg tablet Take by mouth daily at bedtime as needed for anxiety    Historical Provider, MD   bisoprolol (ZEBETA) 5 mg tablet Take 5 mg by mouth daily    Historical Provider, MD   ergocalciferol (VITAMIN D2) 50,000 units Take 1 capsule by mouth once a week for 8 doses 12/7/17 1/26/18  Mignon Chavira DO   lisinopril (ZESTRIL) 10 mg tablet Take 10 mg by mouth daily    Historical Provider, MD   nicotine (NICODERM CQ) 14 mg/24hr TD 24 hr patch Place 1 patch on the skin daily 12/7/17   Mignon Chavira DO   oxyCODONE (OxyCONTIN) 15 mg 12 hr tablet Take 10 mg by mouth every 12 (twelve) hours    Historical Provider, MD feldmanorperazine (COMPAZINE) 5 mg tablet Take 1 tablet (5 mg total) by mouth every 6 (six) hours as needed for nausea or vomiting for up to 3 days 1/27/18 1/30/18  SCOTT Sosa       Current Facility-Administered Medications   Medication Dose Route Frequency    calcium gluconate 2 g in sodium chloride 0 9 % 100 mL IVPB  2 g Intravenous Once    magnesium sulfate 2 g/50 mL IVPB (premix) 2 g  2 g Intravenous Once    sodium chloride 0 9 % bolus 1,000 mL  1,000 mL Intravenous Once       No Known Allergies    Objective       Intake/Output Summary (Last 24 hours) at 11/24/2019 1708  Last data filed at 11/24/2019 1446  Gross per 24 hour   Intake 1000 ml   Output    Net 1000 ml       Invasive Devices:   Peripheral IV 11/24/19 Right Antecubital (Active)   Site Assessment Clean;Dry; Intact 11/24/2019  1:33 PM   Dressing Type Transparent 11/24/2019  1:33 PM   Line Status Blood return noted; Flushed 11/24/2019  1:33 PM   Dressing Status Dry;Clean; Intact 11/24/2019  1:33 PM       Peripheral IV 11/24/19 Left Antecubital (Active)   Site Assessment Clean;Dry; Intact 11/24/2019  1:35 PM   Dressing Type Transparent 11/24/2019  1:35 PM   Line Status Blood return noted 11/24/2019  1:35 PM   Dressing Status Clean;Dry; Intact 11/24/2019  1:35 PM       Peripheral IV 11/24/19 Right Forearm (Active)   Site Assessment Clean;Dry; Intact 11/24/2019  1:48 PM   Dressing Type Transparent 11/24/2019  1:48 PM   Line Status Blood return noted 11/24/2019  1:48 PM   Dressing Status Clean;Dry; Intact 11/24/2019  1:48 PM       Vitals   Vitals:    11/24/19 1600 11/24/19 1615 11/24/19 1630 11/24/19 1645   BP: 116/70 122/72 118/80 120/82   Pulse: 92 89 93 99   Resp: 16 16 13 15   Patient Position - Orthostatic VS: Sitting Sitting Sitting Sitting   Temp:           Physical Exam   Constitutional: He is oriented to person, place, and time  He appears well-developed and well-nourished  HENT:   Head: Normocephalic and atraumatic  Eyes: Pupils are equal, round, and reactive to light  Conjunctivae and EOM are normal    Neck: Normal range of motion  Cardiovascular: Regular rhythm  Tachycardia present  Pulmonary/Chest: Effort normal    Musculoskeletal: Normal range of motion  Neurological: He is alert and oriented to person, place, and time  Skin: Skin is dry  Psychiatric: His behavior is normal  He is not actively hallucinating  He expresses impulsivity and inappropriate judgment  He expresses no suicidal plans  He exhibits abnormal recent memory  He exhibits normal remote memory  He is attentive  Nursing note and vitals reviewed  EKG, Pathology, and Other Studies: I have personally reviewed and interpreted the ECG: NSR 91 BPM,  MSQTc 504 MS, no obvious ischemic changes    Lab Results: I have personally reviewed pertinent reports        Labs:  Results from last 7 days   Lab Units 11/24/19  1348   WBC Thousand/uL 5 69   HEMOGLOBIN g/dL 13 0   HEMATOCRIT % 41 5   PLATELETS Thousands/uL 252   NEUTROS PCT % 69   LYMPHS PCT % 21   MONOS PCT % 6      Results from last 7 days   Lab Units 11/24/19  1348   SODIUM mmol/L 140   POTASSIUM mmol/L 3 8   CHLORIDE mmol/L 106   CO2 mmol/L 23   BUN mg/dL 17   CREATININE mg/dL 1 21   CALCIUM mg/dL 7 9*   ALK PHOS U/L 56   ALT U/L 15   AST U/L 19   MAGNESIUM mg/dL 1 7      Results from last 7 days   Lab Units 11/24/19  1348   INR  1 07   PTT seconds 24     Results from last 7 days   Lab Units 11/24/19  1348   LACTIC ACID mmol/L 1 7     0   Lab Value Date/Time    TROPONINI <0 02 11/24/2019 1348    TROPONINI <0 02 12/06/2017 0501    TROPONINI <0 02 12/06/2017 0200    TROPONINI <0 02 12/05/2017 2109     Results from last 7 days   Lab Units 11/24/19  1348   PH MUKESH  7 275*     Results from last 7 days   Lab Units 11/24/19  1348   ACETAMINOPHEN LVL ug/mL <2*   ETHANOL LVL mg/dL <3   SALICYLATE LVL mg/dL <3*         Imaging Studies: I have personally reviewed pertinent reports     and CT head normal      Minutes of critical care time 48  -Critical care time was exclusive of seperately billable procedures and teaching time    -Critical care was necessary to treat or prevent imminent or life-threatening deterioration of the following condition: cardiac failure, CNS failure/comprimise, dehydration, renal failure, toxidrome   -Critical care time was spent personally by me on the following activities as well as the above as per the course and rest of chart: obtaining history from patient/surrogate, developement of a treatment plan, discussions with primary provider(s), evaluation of patient's response to the treatment, examination of the patient, recommending treatements and interventions, re-evaluation of the patient's condition, review of old charts, recommending/reviewing laboratory studies, recommending/reviewing of radiographic studies

## 2019-11-24 NOTE — ED PROVIDER NOTES
History  Chief Complaint   Patient presents with    Overdose - Intentional     pt is very confused and is not answering any questions; pt is denying taking any substances; found on the side of the road stating that he felt like he was having a heart attack     Patient: Sydnee Amador  44 y o /male  YOB: 1975  MRN: [id number]  PCP: Lanie Dang DO  Date of evaluation: 11/24/2019    (N B   [address] may have been used in the preparation of this document  Occasional wrong word or "sound-alike" substitutions may have occurred due to the inherent limitations of voice recognition software  Interpretation should be guided by context )    (history and review of systems are impossible at this point  Patient is physically agitated, reaching for invisible objects, speaking in recognizable words but word salad  )    EMS providers report the patient was attempting to flag down help  He told them that he was afraid he was having a heart attack  He was afraid he would die  On their evaluation, he was very agitated, unable to give history except fact that he had taken a lot of Tylenol p m     They were able to give him a total of 5 mg of Versed which made him calm enough for them to get an EKG intravenous access  History provided by:  EMS personnel  History limited by:  Mental status change  Overdose - Intentional   Ingested substance: Per EMS history and per PD information from patient's family, ingested substances will include Tylenol p m , Mucinex, and methamphetamines  Prior to Admission Medications   Prescriptions Last Dose Informant Patient Reported? Taking?    ALPRAZolam (XANAX) 0 5 mg tablet   Yes No   Sig: Take by mouth daily at bedtime as needed for anxiety   bisoprolol (ZEBETA) 5 mg tablet   Yes No   Sig: Take 5 mg by mouth daily   ergocalciferol (VITAMIN D2) 50,000 units   No No   Sig: Take 1 capsule by mouth once a week for 8 doses   lisinopril (ZESTRIL) 10 mg tablet Yes No   Sig: Take 10 mg by mouth daily   nicotine (NICODERM CQ) 14 mg/24hr TD 24 hr patch   No No   Sig: Place 1 patch on the skin daily   oxyCODONE (OxyCONTIN) 15 mg 12 hr tablet   Yes No   Sig: Take 10 mg by mouth every 12 (twelve) hours   prochlorperazine (COMPAZINE) 5 mg tablet   No No   Sig: Take 1 tablet (5 mg total) by mouth every 6 (six) hours as needed for nausea or vomiting for up to 3 days      Facility-Administered Medications: None       Past Medical History:   Diagnosis Date    Biceps tendon tear     Carpal tunnel syndrome     Chronic pain     back    Hypertension        Past Surgical History:   Procedure Laterality Date    CARPAL TUNNEL RELEASE      ROTATOR CUFF REPAIR      ULNAR NERVE REPAIR         History reviewed  No pertinent family history  I have reviewed and agree with the history as documented  Social History     Tobacco Use    Smoking status: Current Every Day Smoker     Packs/day: 1 00     Years: 25 00     Pack years: 25 00    Smokeless tobacco: Never Used   Substance Use Topics    Alcohol use: No    Drug use: No     Comment: pt denies all substances         Review of Systems   Unable to perform ROS: Mental status change       Physical Exam  Physical Exam   Constitutional: He is oriented to person, place, and time  He appears well-developed and well-nourished  He is cooperative  HENT:   Mouth/Throat: Oropharynx is clear and moist and mucous membranes are normal    Voice normal   Eyes: Pupils are equal, round, and reactive to light  EOM are normal    Cardiovascular: Normal rate and regular rhythm  Pulmonary/Chest: Effort normal    Abdominal: Soft  Normal appearance  Bowel sounds are decreased  There is no tenderness  There is no rigidity, no rebound and no guarding  Neurological: He is alert and oriented to person, place, and time  He displays no atrophy  GCS eye subscore is 4  GCS verbal subscore is 5  GCS motor subscore is 6     Moves all extremities equally and with purpose   Skin: Skin is warm and dry  He is not diaphoretic  Psychiatric: He has a normal mood and affect  His speech is normal  He is agitated  He is not aggressive and not combative  Paranoid: Denied  Homicidal:  denied  The patient is attempting to communicate  He uses recognizable words but it is a word salad  He is attentive  Nursing note and vitals reviewed        Vital Signs  ED Triage Vitals   Temperature Pulse Respirations Blood Pressure SpO2   11/24/19 1332 11/24/19 1332 11/24/19 1332 11/24/19 1332 11/24/19 1332   98 7 °F (37 1 °C) 80 (!) 24 131/89 97 %      Temp src Heart Rate Source Patient Position - Orthostatic VS BP Location FiO2 (%)   -- 11/24/19 1332 11/24/19 1332 11/24/19 1332 --    Monitor Sitting Right arm       Pain Score       11/24/19 1330       No Pain           Vitals:    11/24/19 1430 11/24/19 1515 11/24/19 1530 11/24/19 1545   BP: 116/82 138/80 130/80 128/72   Pulse: 91 91 92 93   Patient Position - Orthostatic VS: Sitting Sitting Sitting Sitting         Visual Acuity  Visual Acuity      Most Recent Value   L Pupil Size (mm)  2   R Pupil Size (mm)  2          ED Medications  Medications   naloxone (FOR EMS ONLY) (NARCAN) 2 MG/2ML injection 2 mg (has no administration in time range)   midazolam (FOR EMS ONLY) (VERSED) 2 mg/2 mL injection 6 mg (has no administration in time range)   sodium chloride 0 9 % bolus 1,000 mL (0 mL Intravenous Stopped 11/24/19 1446)       Diagnostic Studies  Results Reviewed     Procedure Component Value Units Date/Time    Urine Microscopic [[id number]]  (Abnormal) Collected:  11/24/19 1448    Lab Status:  Final result Specimen:  Urine, Clean Catch Updated:  11/24/19 1513     RBC, UA None Seen /hpf      WBC, UA 0-1 /hpf      Epithelial Cells None Seen /hpf      Bacteria, UA Occasional /hpf     Rapid drug screen, urine [[id number]]  (Abnormal) Collected:  11/24/19 1449    Lab Status:  Final result Specimen:  Urine, Clean Catch Updated:  11/24/19 0199 Amph/Meth UR Negative     Barbiturate Ur Negative     Benzodiazepine Urine Positive     Cocaine Urine Negative     Methadone Urine Negative     Opiate Urine Positive     PCP Ur Positive     THC Urine Negative    Narrative:       Presumptive report  If requested, specimen will be sent to reference lab for confirmation  FOR MEDICAL PURPOSES ONLY  IF CONFIRMATION NEEDED PLEASE CONTACT THE LAB WITHIN 5 DAYS  Drug Screen Cutoff Levels:  AMPHETAMINE/METHAMPHETAMINES  1000 ng/mL  BARBITURATES     200 ng/mL  BENZODIAZEPINES     200 ng/mL  COCAINE      300 ng/mL  METHADONE      300 ng/mL  OPIATES      300 ng/mL  PHENCYCLIDINE     25 ng/mL  THC       50 ng/mL      UA (URINE) with reflex to Scope [[id number]]  (Abnormal) Collected:  11/24/19 1448    Lab Status:  Final result Specimen:  Urine, Clean Catch Updated:  11/24/19 1501     Color, UA Yellow     Clarity, UA Clear     Specific Gravity, UA >=1 030     pH, UA 6 0     Leukocytes, UA Trace     Nitrite, UA Negative     Protein, UA 30 (1+) mg/dl      Glucose, UA Negative mg/dl      Ketones, UA Negative mg/dl      Urobilinogen, UA 0 2 E U /dl      Bilirubin, UA Negative     Blood, UA Negative    Blood gas, arterial [[id number]]  (Abnormal) Collected:  11/24/19 1449    Lab Status:  Final result Specimen:  Blood, Arterial from Artery Updated:  11/24/19 1455     pH, Arterial 7 299     pCO2, Arterial 35 3 mm Hg      pO2, Arterial 83 0 mm Hg      HCO3, Arterial 16 9 mmol/L      Base Excess, Arterial -8 6 mmol/L      O2 Content, Arterial 17 7 mL/dL      O2 HGB,Arterial  93 7 %      SOURCE Artery    TSH [[id number]]  (Normal) Collected:  11/24/19 1348    Lab Status:  Final result Specimen:  Blood from Arm, Right Updated:  11/24/19 1439     TSH 3RD GENERATON 0 940 uIU/mL     Narrative:       Patients undergoing fluorescein dye angiography may retain small amounts of fluorescein in the body for 48-72 hours post procedure   Samples containing fluorescein can produce falsely depressed TSH values  If the patient had this procedure,a specimen should be resubmitted post fluorescein clearance  CKMB [[id number]]  (Normal) Collected:  11/24/19 1348    Lab Status:  Final result Specimen:  Blood from Arm, Right Updated:  11/24/19 1439     CK-MB Index 1 2 %      CK-MB 2 4 ng/mL     Lactic acid, plasma [[id number]]  (Normal) Collected:  11/24/19 1348    Lab Status:  Final result Specimen:  Blood from Arm, Right Updated:  11/24/19 1422     LACTIC ACID 1 7 mmol/L     Narrative:       Result may be elevated if tourniquet was used during collection  Troponin I [[id number]]  (Normal) Collected:  11/24/19 1348    Lab Status:  Final result Specimen:  Blood from Arm, Right Updated:  11/24/19 1421     Troponin I <0 02 ng/mL     Magnesium [[id number]]  (Normal) Collected:  11/24/19 1348    Lab Status:  Final result Specimen:  Blood from Arm, Right Updated:  11/24/19 1420     Magnesium 1 7 mg/dL     Salicylate level [[id number]]  (Abnormal) Collected:  11/24/19 1348    Lab Status:  Final result Specimen:  Blood from Arm, Right Updated:  44/98/88 5357     Salicylate Lvl <3 mg/dL     Acetaminophen level-If concentration is detectable, please discuss with medical  on call   [[id number]]  (Abnormal) Collected:  11/24/19 1348    Lab Status:  Final result Specimen:  Blood from Arm, Right Updated:  11/24/19 1420     Acetaminophen Level <2 ug/mL     CK Total with Reflex CKMB [[id number]]  (Normal) Collected:  11/24/19 1348    Lab Status:  Final result Specimen:  Blood from Arm, Right Updated:  11/24/19 1419     Total  U/L     Ethanol [[id number]]  (Normal) Collected:  11/24/19 1348    Lab Status:  Final result Specimen:  Blood from Arm, Right Updated:  11/24/19 1418     Ethanol Lvl <3 mg/dL     Protime-INR [[id number]]  (Normal) Collected:  11/24/19 1348    Lab Status:  Final result Specimen:  Blood from Arm, Right Updated:  11/24/19 1414     Protime 14 0 seconds      INR 1 07    APTT [[id number]]  (Normal) Collected:  11/24/19 1348    Lab Status:  Final result Specimen:  Blood from Arm, Right Updated:  11/24/19 1414     PTT 24 seconds     Ammonia [[id number]]  (Normal) Collected:  11/24/19 1348    Lab Status:  Final result Specimen:  Blood from Arm, Right Updated:  11/24/19 1414     Ammonia 23 umol/L     Fingerstick Glucose (POCT) [[id number]]  (Normal) Collected:  11/24/19 1412    Lab Status:  Final result Updated:  11/24/19 1413     POC Glucose 74 mg/dl     Comprehensive metabolic panel [[id number]]  (Abnormal) Collected:  11/24/19 1348    Lab Status:  Final result Specimen:  Blood from Arm, Right Updated:  11/24/19 1412     Sodium 140 mmol/L      Potassium 3 8 mmol/L      Chloride 106 mmol/L      CO2 23 mmol/L      ANION GAP 11 mmol/L      BUN 17 mg/dL      Creatinine 1 21 mg/dL      Glucose 87 mg/dL      Calcium 7 9 mg/dL      AST 19 U/L      ALT 15 U/L      Alkaline Phosphatase 56 U/L      Total Protein 7 3 g/dL      Albumin 3 7 g/dL      Total Bilirubin 0 40 mg/dL      eGFR 72 ml/min/1 73sq m     Narrative:       [place] guidelines for Chronic Kidney Disease (CKD):     Stage 1 with normal or high GFR (GFR > 90 mL/min/1 73 square meters)    Stage 2 Mild CKD (GFR = 60-89 mL/min/1 73 square meters)    Stage 3A Moderate CKD (GFR = 45-59 mL/min/1 73 square meters)    Stage 3B Moderate CKD (GFR = 30-44 mL/min/1 73 square meters)    Stage 4 Severe CKD (GFR = 15-29 mL/min/1 73 square meters)    Stage 5 End Stage CKD (GFR <15 mL/min/1 73 square meters)  Note: GFR calculation is accurate only with a steady state creatinine    CBC and differential [[id number]]  (Abnormal) Collected:  11/24/19 1348    Lab Status:  Final result Specimen:  Blood from Arm, Right Updated:  11/24/19 1356     WBC 5 69 Thousand/uL      RBC 4 37 Million/uL      Hemoglobin 13 0 g/dL      Hematocrit 41 5 %      MCV 95 fL      MCH 29 7 pg      MCHC 31 3 g/dL      RDW 13 2 %      MPV 9 0 fL      Platelets 081 Thousands/uL nRBC 0 /100 WBCs      Neutrophils Relative 69 %      Immat GRANS % 1 %      Lymphocytes Relative 21 %      Monocytes Relative 6 %      Eosinophils Relative 2 %      Basophils Relative 1 %      Neutrophils Absolute 3 99 Thousands/µL      Immature Grans Absolute 0 03 Thousand/uL      Lymphocytes Absolute 1 21 Thousands/µL      Monocytes Absolute 0 33 Thousand/µL      Eosinophils Absolute 0 09 Thousand/µL      Basophils Absolute 0 04 Thousands/µL     pH, venous [[id number]]  (Abnormal) Collected:  11/24/19 1348    Lab Status:  Final result Specimen:  Blood from Arm, Right Updated:  11/24/19 1356     pH, Ab 7 275                 CT head without contrast   Final Result by Jalen Ayala MD (11/24 1414)      No acute intracranial abnormality  Workstation performed: [id number]         XR chest 1 view portable    (Results Pending)              Procedures  ECG 12 Lead Documentation Only  Date/Time: 11/24/2019 1:31 PM  Performed by: Nazanin Nino MD  Authorized by: Nazanin Nino MD     Indications / Diagnosis:  Overdose  ECG reviewed by me, the ED Provider: yes    Previous ECG:     Comparison to cardiac monitor: Yes    Interpretation:     Interpretation: abnormal    Rate:     ECG rate:  91    ECG rate assessment: normal    Rhythm:     Rhythm: sinus rhythm    Ectopy:     Ectopy: none             ED Course  ED Course as of Nov 24 1608   Sun Nov 24, 2019   1408 Pt is calm and cooperative at present, physically relaxed        [address]: <3   9603 SALICYLATE LEVEL(!): <3   1426 ACETAMINOPHEN LEVEL(!): <2   1426 POC Glucose: 74   1444 IMPRESSION:     No acute intracranial abnormality                  Workstation performed: [id number]   CT head without contrast   1517 PHENCYCLIDINE URINE(!): Positive   1517 OPIATE URINE(!): Positive   1517 BENZODIAZEPINE URINE(!): Positive   1518 AMPH/METH: Negative   1518 COCAINE URINE: Negative   1518 METHADONE URINE: Negative   1603 Pt reports he took about 16 Mucinex DM, 60 loperamide, 20 Advil PM   He denies that he meant to harm himself; he says these things just to "numb" your mind from all your problems  He denies active suicidal thoughts or plans  He does sometimes feel that he wouldn't mind if he   He does recall that when he summoned EMS he definitely wanted to live  MDM  Number of Diagnoses or Management Options     Amount and/or Complexity of Data Reviewed  Tests in the radiology section of CPT®: ordered and reviewed  Tests in the medicine section of CPT®: ordered and reviewed  Decide to obtain previous medical records or to obtain history from someone other than the patient: yes  Obtain history from someone other than the patient: yes  Independent visualization of images, tracings, or specimens: yes    Risk of Complications, Morbidity, and/or Mortality  Presenting problems: high  Diagnostic procedures: high  Management options: high    Patient Progress  Patient progress: improved      Disposition  Final diagnoses:   None     ED Disposition     None      Follow-up Information    None         Patient's Medications   Discharge Prescriptions    No medications on file     No discharge procedures on file      ED Provider  Electronically Signed by           Nazanin Nino MD  19 [id number]

## 2019-11-24 NOTE — ED NOTES
Lilo Villegas in pharmacy called about sending down calcium gluconate 2g in sodium chloride        Odilia Carreon RN  11/24/19 8395

## 2019-11-24 NOTE — ED NOTES
Patient states that he does not want to hurt himself or anyone else  Patient states that he took "a bunch of tylenol pm and mucinex because "he likes the way it makes him feel"  Patient states he has been going through relationship issues and it "numbs" the pain and makes him feel "nothing" and that is why he takes it  Patient denies any other illegal substances        Selvin Castellon RN  11/24/19 3324

## 2019-11-24 NOTE — ED NOTES
Jeff Wing from respiratory therapy called about ABG on patient        Eugenia Shaver RN  11/24/19 7013

## 2019-11-24 NOTE — ED NOTES
Security at bedside  Patient is very restless/agitated with fists clenched        Anum Del Valle RN  11/24/19 6710

## 2019-11-24 NOTE — ED NOTES
Patient resting comfortably in bed  Patient states he does not have any pain at this time        Samina Portillo RN  11/24/19 4315

## 2019-11-25 ENCOUNTER — HOSPITAL ENCOUNTER (INPATIENT)
Facility: HOSPITAL | Age: 44
LOS: 2 days | Discharge: HOME | DRG: 753 | End: 2019-11-27
Attending: PSYCHIATRY & NEUROLOGY | Admitting: HOSPITALIST
Payer: COMMERCIAL

## 2019-11-25 VITALS
HEIGHT: 71 IN | OXYGEN SATURATION: 97 % | HEART RATE: 66 BPM | WEIGHT: 198.19 LBS | DIASTOLIC BLOOD PRESSURE: 90 MMHG | BODY MASS INDEX: 27.75 KG/M2 | SYSTOLIC BLOOD PRESSURE: 141 MMHG | TEMPERATURE: 98 F | RESPIRATION RATE: 18 BRPM

## 2019-11-25 DIAGNOSIS — F19.10 SUBSTANCE ABUSE (HCC): Primary | ICD-10-CM

## 2019-11-25 DIAGNOSIS — T50.902A INTENTIONAL DRUG OVERDOSE, INITIAL ENCOUNTER (HCC): ICD-10-CM

## 2019-11-25 PROBLEM — F11.93 OPIATE WITHDRAWAL (HCC): Status: ACTIVE | Noted: 2019-11-25

## 2019-11-25 PROBLEM — F11.23 OPIATE WITHDRAWAL (HCC): Status: ACTIVE | Noted: 2019-11-25

## 2019-11-25 LAB
ALBUMIN SERPL BCP-MCNC: 3.2 G/DL (ref 3.5–5)
ALP SERPL-CCNC: 48 U/L (ref 46–116)
ALT SERPL W P-5'-P-CCNC: 13 U/L (ref 12–78)
ANION GAP SERPL CALCULATED.3IONS-SCNC: 8 MMOL/L (ref 4–13)
ANION GAP SERPL CALCULATED.3IONS-SCNC: 9 MMOL/L (ref 4–13)
AST SERPL W P-5'-P-CCNC: 13 U/L (ref 5–45)
ATRIAL RATE: 67 BPM
ATRIAL RATE: 70 BPM
ATRIAL RATE: 78 BPM
ATRIAL RATE: 91 BPM
BILIRUB SERPL-MCNC: 0.4 MG/DL (ref 0.2–1)
BUN SERPL-MCNC: 12 MG/DL (ref 5–25)
BUN SERPL-MCNC: 13 MG/DL (ref 5–25)
CALCIUM SERPL-MCNC: 7.5 MG/DL (ref 8.3–10.1)
CALCIUM SERPL-MCNC: 8 MG/DL (ref 8.3–10.1)
CHLORIDE SERPL-SCNC: 110 MMOL/L (ref 100–108)
CHLORIDE SERPL-SCNC: 110 MMOL/L (ref 100–108)
CO2 SERPL-SCNC: 21 MMOL/L (ref 21–32)
CO2 SERPL-SCNC: 22 MMOL/L (ref 21–32)
CREAT SERPL-MCNC: 0.85 MG/DL (ref 0.6–1.3)
CREAT SERPL-MCNC: 0.85 MG/DL (ref 0.6–1.3)
ERYTHROCYTE [DISTWIDTH] IN BLOOD BY AUTOMATED COUNT: 13.5 % (ref 11.6–15.1)
GFR SERPL CREATININE-BSD FRML MDRD: 105 ML/MIN/1.73SQ M
GFR SERPL CREATININE-BSD FRML MDRD: 105 ML/MIN/1.73SQ M
GLUCOSE SERPL-MCNC: 90 MG/DL (ref 65–140)
GLUCOSE SERPL-MCNC: 93 MG/DL (ref 65–140)
HCT VFR BLD AUTO: 44.3 % (ref 36.5–49.3)
HGB BLD-MCNC: 13.7 G/DL (ref 12–17)
MAGNESIUM SERPL-MCNC: 2 MG/DL (ref 1.6–2.6)
MAGNESIUM SERPL-MCNC: 2.1 MG/DL (ref 1.6–2.6)
MCH RBC QN AUTO: 29.8 PG (ref 26.8–34.3)
MCHC RBC AUTO-ENTMCNC: 30.9 G/DL (ref 31.4–37.4)
MCV RBC AUTO: 97 FL (ref 82–98)
P AXIS: 37 DEGREES
P AXIS: 50 DEGREES
P AXIS: 56 DEGREES
P AXIS: 59 DEGREES
PHOSPHATE SERPL-MCNC: 3.3 MG/DL (ref 2.7–4.5)
PLATELET # BLD AUTO: 253 THOUSANDS/UL (ref 149–390)
PMV BLD AUTO: 9.4 FL (ref 8.9–12.7)
POTASSIUM SERPL-SCNC: 3.8 MMOL/L (ref 3.5–5.3)
POTASSIUM SERPL-SCNC: 4 MMOL/L (ref 3.5–5.3)
PR INTERVAL: 144 MS
PR INTERVAL: 150 MS
PR INTERVAL: 150 MS
PR INTERVAL: 158 MS
PROT SERPL-MCNC: 6.6 G/DL (ref 6.4–8.2)
QRS AXIS: -20 DEGREES
QRS AXIS: -5 DEGREES
QRS AXIS: 1 DEGREES
QRS AXIS: 6 DEGREES
QRSD INTERVAL: 100 MS
QRSD INTERVAL: 100 MS
QRSD INTERVAL: 102 MS
QRSD INTERVAL: 104 MS
QT INTERVAL: 410 MS
QT INTERVAL: 430 MS
QT INTERVAL: 444 MS
QT INTERVAL: 448 MS
QTC INTERVAL: 469 MS
QTC INTERVAL: 483 MS
QTC INTERVAL: 490 MS
QTC INTERVAL: 504 MS
RBC # BLD AUTO: 4.59 MILLION/UL (ref 3.88–5.62)
SODIUM SERPL-SCNC: 139 MMOL/L (ref 136–145)
SODIUM SERPL-SCNC: 141 MMOL/L (ref 136–145)
T WAVE AXIS: 32 DEGREES
T WAVE AXIS: 43 DEGREES
T WAVE AXIS: 44 DEGREES
T WAVE AXIS: 45 DEGREES
VENTRICULAR RATE: 67 BPM
VENTRICULAR RATE: 70 BPM
VENTRICULAR RATE: 78 BPM
VENTRICULAR RATE: 91 BPM
WBC # BLD AUTO: 4.97 THOUSAND/UL (ref 4.31–10.16)

## 2019-11-25 PROCEDURE — 85027 COMPLETE CBC AUTOMATED: CPT | Performed by: PHYSICIAN ASSISTANT

## 2019-11-25 PROCEDURE — 99217 PR OBSERVATION CARE DISCHARGE MANAGEMENT: CPT | Performed by: PHYSICIAN ASSISTANT

## 2019-11-25 PROCEDURE — RECHECK: Performed by: PHYSICIAN ASSISTANT

## 2019-11-25 PROCEDURE — 93005 ELECTROCARDIOGRAM TRACING: CPT

## 2019-11-25 PROCEDURE — 84100 ASSAY OF PHOSPHORUS: CPT | Performed by: PHYSICIAN ASSISTANT

## 2019-11-25 PROCEDURE — 80048 BASIC METABOLIC PNL TOTAL CA: CPT | Performed by: PHYSICIAN ASSISTANT

## 2019-11-25 PROCEDURE — 93010 ELECTROCARDIOGRAM REPORT: CPT | Performed by: INTERNAL MEDICINE

## 2019-11-25 PROCEDURE — 80053 COMPREHEN METABOLIC PANEL: CPT | Performed by: PHYSICIAN ASSISTANT

## 2019-11-25 PROCEDURE — 83735 ASSAY OF MAGNESIUM: CPT | Performed by: PHYSICIAN ASSISTANT

## 2019-11-25 RX ORDER — HYDROXYZINE HYDROCHLORIDE 25 MG/1
50 TABLET, FILM COATED ORAL EVERY 4 HOURS PRN
Status: DISCONTINUED | OUTPATIENT
Start: 2019-11-25 | End: 2019-11-27 | Stop reason: HOSPADM

## 2019-11-25 RX ORDER — RISPERIDONE 1 MG/1
1 TABLET, ORALLY DISINTEGRATING ORAL
Status: DISCONTINUED | OUTPATIENT
Start: 2019-11-25 | End: 2019-11-27 | Stop reason: HOSPADM

## 2019-11-25 RX ORDER — NICOTINE 21 MG/24HR
1 PATCH, TRANSDERMAL 24 HOURS TRANSDERMAL DAILY
Status: CANCELLED | OUTPATIENT
Start: 2019-11-26

## 2019-11-25 RX ORDER — METHOCARBAMOL 500 MG/1
500 TABLET, FILM COATED ORAL EVERY 6 HOURS PRN
Status: DISCONTINUED | OUTPATIENT
Start: 2019-11-25 | End: 2019-11-27 | Stop reason: HOSPADM

## 2019-11-25 RX ORDER — RISPERIDONE 1 MG/1
1 TABLET, ORALLY DISINTEGRATING ORAL
Status: CANCELLED | OUTPATIENT
Start: 2019-11-25

## 2019-11-25 RX ORDER — MAGNESIUM HYDROXIDE/ALUMINUM HYDROXICE/SIMETHICONE 120; 1200; 1200 MG/30ML; MG/30ML; MG/30ML
30 SUSPENSION ORAL EVERY 4 HOURS PRN
Status: CANCELLED | OUTPATIENT
Start: 2019-11-25

## 2019-11-25 RX ORDER — ACETAMINOPHEN 325 MG/1
650 TABLET ORAL EVERY 6 HOURS PRN
Status: DISCONTINUED | OUTPATIENT
Start: 2019-11-25 | End: 2019-11-27 | Stop reason: HOSPADM

## 2019-11-25 RX ORDER — ACETAMINOPHEN 325 MG/1
650 TABLET ORAL EVERY 4 HOURS PRN
Status: CANCELLED | OUTPATIENT
Start: 2019-11-25

## 2019-11-25 RX ORDER — DICYCLOMINE HYDROCHLORIDE 10 MG/1
20 CAPSULE ORAL EVERY 6 HOURS PRN
Status: DISCONTINUED | OUTPATIENT
Start: 2019-11-25 | End: 2019-11-27 | Stop reason: HOSPADM

## 2019-11-25 RX ORDER — BENZTROPINE MESYLATE 1 MG/ML
1 INJECTION INTRAMUSCULAR; INTRAVENOUS EVERY 6 HOURS PRN
Status: CANCELLED | OUTPATIENT
Start: 2019-11-25

## 2019-11-25 RX ORDER — BENZTROPINE MESYLATE 1 MG/1
1 TABLET ORAL EVERY 6 HOURS PRN
Status: DISCONTINUED | OUTPATIENT
Start: 2019-11-25 | End: 2019-11-27 | Stop reason: HOSPADM

## 2019-11-25 RX ORDER — BISOPROLOL FUMARATE 5 MG/1
5 TABLET ORAL DAILY
Status: DISCONTINUED | OUTPATIENT
Start: 2019-11-26 | End: 2019-11-27 | Stop reason: HOSPADM

## 2019-11-25 RX ORDER — HYDROXYZINE HYDROCHLORIDE 25 MG/1
50 TABLET, FILM COATED ORAL EVERY 4 HOURS PRN
Status: CANCELLED | OUTPATIENT
Start: 2019-11-25

## 2019-11-25 RX ORDER — OLANZAPINE 10 MG/1
5 INJECTION, POWDER, LYOPHILIZED, FOR SOLUTION INTRAMUSCULAR
Status: CANCELLED | OUTPATIENT
Start: 2019-11-25

## 2019-11-25 RX ORDER — LORAZEPAM 2 MG/ML
2 INJECTION INTRAMUSCULAR EVERY 6 HOURS PRN
Status: DISCONTINUED | OUTPATIENT
Start: 2019-11-25 | End: 2019-11-27 | Stop reason: HOSPADM

## 2019-11-25 RX ORDER — BISOPROLOL FUMARATE 5 MG/1
5 TABLET ORAL DAILY
Status: CANCELLED | OUTPATIENT
Start: 2019-11-26

## 2019-11-25 RX ORDER — HYDROXYZINE HYDROCHLORIDE 25 MG/1
25 TABLET, FILM COATED ORAL EVERY 6 HOURS PRN
Status: CANCELLED | OUTPATIENT
Start: 2019-11-25

## 2019-11-25 RX ORDER — ACETAMINOPHEN 325 MG/1
650 TABLET ORAL EVERY 6 HOURS PRN
Status: CANCELLED | OUTPATIENT
Start: 2019-11-25

## 2019-11-25 RX ORDER — CLONIDINE HYDROCHLORIDE 0.1 MG/1
0.1 TABLET ORAL EVERY 6 HOURS PRN
Status: DISCONTINUED | OUTPATIENT
Start: 2019-11-25 | End: 2019-11-27 | Stop reason: HOSPADM

## 2019-11-25 RX ORDER — LORAZEPAM 2 MG/ML
2 INJECTION INTRAMUSCULAR EVERY 6 HOURS PRN
Status: CANCELLED | OUTPATIENT
Start: 2019-11-25

## 2019-11-25 RX ORDER — ACETAMINOPHEN 325 MG/1
650 TABLET ORAL EVERY 4 HOURS PRN
Status: DISCONTINUED | OUTPATIENT
Start: 2019-11-25 | End: 2019-11-27 | Stop reason: HOSPADM

## 2019-11-25 RX ORDER — BENZTROPINE MESYLATE 1 MG/ML
1 INJECTION INTRAMUSCULAR; INTRAVENOUS EVERY 6 HOURS PRN
Status: DISCONTINUED | OUTPATIENT
Start: 2019-11-25 | End: 2019-11-27 | Stop reason: HOSPADM

## 2019-11-25 RX ORDER — DIPHENHYDRAMINE HCL 25 MG
25 TABLET ORAL EVERY 6 HOURS PRN
Status: DISCONTINUED | OUTPATIENT
Start: 2019-11-25 | End: 2019-11-27 | Stop reason: HOSPADM

## 2019-11-25 RX ORDER — NICOTINE 21 MG/24HR
1 PATCH, TRANSDERMAL 24 HOURS TRANSDERMAL DAILY
Status: DISCONTINUED | OUTPATIENT
Start: 2019-11-26 | End: 2019-11-27 | Stop reason: HOSPADM

## 2019-11-25 RX ORDER — DIPHENHYDRAMINE HYDROCHLORIDE 50 MG/ML
25 INJECTION INTRAMUSCULAR; INTRAVENOUS EVERY 6 HOURS PRN
Status: DISCONTINUED | OUTPATIENT
Start: 2019-11-25 | End: 2019-11-27 | Stop reason: HOSPADM

## 2019-11-25 RX ORDER — ACETAMINOPHEN 325 MG/1
975 TABLET ORAL EVERY 6 HOURS PRN
Status: CANCELLED | OUTPATIENT
Start: 2019-11-25

## 2019-11-25 RX ORDER — TRAZODONE HYDROCHLORIDE 50 MG/1
50 TABLET ORAL
Status: CANCELLED | OUTPATIENT
Start: 2019-11-25

## 2019-11-25 RX ORDER — HYDROXYZINE HYDROCHLORIDE 25 MG/1
25 TABLET, FILM COATED ORAL EVERY 6 HOURS PRN
Status: DISCONTINUED | OUTPATIENT
Start: 2019-11-25 | End: 2019-11-27 | Stop reason: HOSPADM

## 2019-11-25 RX ORDER — DIPHENHYDRAMINE HCL 25 MG
25 TABLET ORAL EVERY 6 HOURS PRN
Status: CANCELLED | OUTPATIENT
Start: 2019-11-25

## 2019-11-25 RX ORDER — TRAZODONE HYDROCHLORIDE 50 MG/1
50 TABLET ORAL
Status: DISCONTINUED | OUTPATIENT
Start: 2019-11-25 | End: 2019-11-27 | Stop reason: HOSPADM

## 2019-11-25 RX ORDER — LISINOPRIL 10 MG/1
10 TABLET ORAL DAILY
Status: CANCELLED | OUTPATIENT
Start: 2019-11-26

## 2019-11-25 RX ORDER — MAGNESIUM HYDROXIDE/ALUMINUM HYDROXICE/SIMETHICONE 120; 1200; 1200 MG/30ML; MG/30ML; MG/30ML
30 SUSPENSION ORAL EVERY 4 HOURS PRN
Status: DISCONTINUED | OUTPATIENT
Start: 2019-11-25 | End: 2019-11-27 | Stop reason: HOSPADM

## 2019-11-25 RX ORDER — BENZTROPINE MESYLATE 1 MG/1
1 TABLET ORAL EVERY 6 HOURS PRN
Status: CANCELLED | OUTPATIENT
Start: 2019-11-25

## 2019-11-25 RX ORDER — ACETAMINOPHEN 325 MG/1
975 TABLET ORAL EVERY 6 HOURS PRN
Status: DISCONTINUED | OUTPATIENT
Start: 2019-11-25 | End: 2019-11-27 | Stop reason: HOSPADM

## 2019-11-25 RX ORDER — OLANZAPINE 10 MG/1
5 INJECTION, POWDER, LYOPHILIZED, FOR SOLUTION INTRAMUSCULAR
Status: DISCONTINUED | OUTPATIENT
Start: 2019-11-25 | End: 2019-11-27 | Stop reason: HOSPADM

## 2019-11-25 RX ORDER — DIPHENHYDRAMINE HYDROCHLORIDE 50 MG/ML
25 INJECTION INTRAMUSCULAR; INTRAVENOUS EVERY 6 HOURS PRN
Status: CANCELLED | OUTPATIENT
Start: 2019-11-25

## 2019-11-25 RX ADMIN — DICYCLOMINE HYDROCHLORIDE 20 MG: 10 CAPSULE ORAL at 22:51

## 2019-11-25 RX ADMIN — NICOTINE 1 PATCH: 14 PATCH TRANSDERMAL at 10:23

## 2019-11-25 RX ADMIN — BISOPROLOL FUMARATE 5 MG: 5 TABLET ORAL at 10:23

## 2019-11-25 RX ADMIN — HYDROXYZINE HYDROCHLORIDE 50 MG: 25 TABLET ORAL at 22:51

## 2019-11-25 RX ADMIN — CLONIDINE HYDROCHLORIDE 0.1 MG: 0.1 TABLET ORAL at 22:51

## 2019-11-25 RX ADMIN — LISINOPRIL 10 MG: 10 TABLET ORAL at 10:23

## 2019-11-25 RX ADMIN — METHOCARBAMOL 500 MG: 500 TABLET, FILM COATED ORAL at 22:51

## 2019-11-25 NOTE — SOCIAL WORK
Authorization process was started I called [phone] and spoke with Amy and the aurthorization process was started , I was told we should receive a call from the rn for clinical in an hour or so, cm will continue to follow

## 2019-11-25 NOTE — SOCIAL WORK
Crisis came to see the pt , pt signed the 201 , crisis worker stated we need to find the pt a bed, referral was sent to the referral line for  Abed, cm will continue to follow

## 2019-11-25 NOTE — PLAN OF CARE
Problem: DISCHARGE PLANNING - CARE MANAGEMENT  Goal: Discharge to post-acute care or home with appropriate resources  Description  INTERVENTIONS:  - Conduct assessment to determine patient/family and health care team treatment goals, and need for post-acute services based on payer coverage, community resources, and patient preferences, and barriers to discharge  - Address psychosocial, clinical, and financial barriers to discharge as identified in assessment in conjunction with the patient/family and health care team  - Arrange appropriate level of post-acute services according to patient's   needs and preference and payer coverage in collaboration with the physician and health care team  - Communicate with and update the patient/family, physician, and health care team regarding progress on the discharge plan  - Arrange appropriate transportation to post-acute venues    Pt to be d/c to [facility]   Outcome: Completed

## 2019-11-25 NOTE — PROGRESS NOTES
Progress Note - Leti Dillon 1975, 38 y o  male MRN: [id number]    Unit/Bed#: 420-01 Encounter: [id number]    Primary Care Provider: Yulia Pettit DO   Date and time admitted to hospital: 11/24/2019  1:25 PM        * Intentional drug overdose [facility])  Assessment & Plan  He presented to the ER via EMS for possible overdose  He admits to taking undisclosed number of benadryl, Imodium, Mucinex pills  He received magnesium 2 g IV and calcium gluconate 2 g IV in the ER  ER attending discussed case with toxicology  EKG showed prolonged QT interval in the ED  Continue one-to-one observation  Monitor electrolytes replace as needed  Repeat EKG showed QT interval less than 500  The patient is medically stable to discharge to inpatient psychiatric facility  Psych consultation appreciated, the patient signed a 201 form but meets criteria for 302 if he changes his mind  Crisis to start bed search  Essential hypertension  Assessment & Plan  Blood pressure is stable  He reports that he has not been taking his prescribed blood pressure medication (bisoprolol, lisinopril)  Will continue home medication  Monitor closely  Encourage close PCP follow up post admission    Chronic back pain  Assessment & Plan  He does admit to chronic back pain  He states that he does not currently have any back pain  Will monitor     Tobacco abuse  Assessment & Plan  Smoking cessation counseling   Nicotine patch      VTE Pharmacologic Prophylaxis:   Pharmacologic: low risk  Mechanical VTE Prophylaxis in Place: Yes    Patient Centered Rounds: I have performed bedside rounds with nursing staff today  Discussions with Specialists or Other Care Team Provider: nursing, CM, and attending      Time Spent for Care: 20 minutes  More than 50% of total time spent on counseling and coordination of care as described above      Current Length of Stay: 0 day(s)    Current Patient Status: Observation   Certification Statement: The patient will continue to require additional inpatient hospital stay due to need for placement to inpatient psychiatry facility  Discharge Plan: Patient is medically stable to discharge, Crisis to start bed search for placement to inpatient psychiatry facility  Code Status: Level 1 - Full Code      Subjective: The patient was seen and examined  The patient states he was not trying to kill or harm himself  He states he was taking the medication to make himself feel "numb"  He currently denies any complaints  Objective:     Vitals:   Temp (24hrs), Av 9 °F (36 6 °C), Min:97 4 °F (36 3 °C), Max:98 2 °F (36 8 °C)    Temp:  [97 4 °F (36 3 °C)-98 2 °F (36 8 °C)] 98 2 °F (36 8 °C)  HR:  [81-99] 81  Resp:  [13-18] 18  BP: (116-164)/() 147/93  SpO2:  [94 %-97 %] 97 %  Body mass index is 27 64 kg/m²  Input and Output Summary (last 24 hours): Intake/Output Summary (Last 24 hours) at 2019 1454  Last data filed at 2019 0900  Gross per 24 hour   Intake 1035 42 ml   Output    Net 1035 42 ml       Physical Exam:     Physical Exam   Constitutional: He is oriented to person, place, and time  Vital signs are normal  He appears well-developed and well-nourished  He is active and cooperative  Cardiovascular: Normal rate, regular rhythm and normal heart sounds  Pulmonary/Chest: Effort normal and breath sounds normal  He has no wheezes  He has no rhonchi  He has no rales  Abdominal: Soft  Normal appearance and bowel sounds are normal  There is no tenderness  Neurological: He is alert and oriented to person, place, and time  No cranial nerve deficit  Skin: Skin is warm, dry and intact  Psychiatric: His speech is normal and behavior is normal  He exhibits a depressed mood  Nursing note and vitals reviewed      Additional Data:     Labs:    Results from last 7 days   Lab Units 19  0649 19  1348   WBC Thousand/uL 4 97 5 69   HEMOGLOBIN g/dL 13 7 13 0   HEMATOCRIT % 44 3 41 5 PLATELETS Thousands/uL 253 252   NEUTROS PCT %  --  69   LYMPHS PCT %  --  21   MONOS PCT %  --  6   EOS PCT %  --  2     Results from last 7 days   Lab Units 11/25/19  0649   SODIUM mmol/L 139   POTASSIUM mmol/L 4 0   CHLORIDE mmol/L 110*   CO2 mmol/L 21   BUN mg/dL 13   CREATININE mg/dL 0 85   ANION GAP mmol/L 8   CALCIUM mg/dL 8 0*   ALBUMIN g/dL 3 2*   TOTAL BILIRUBIN mg/dL 0 40   ALK PHOS U/L 48   ALT U/L 13   AST U/L 13   GLUCOSE RANDOM mg/dL 90     Results from last 7 days   Lab Units 11/24/19  1348   INR  1 07     Results from last 7 days   Lab Units 11/24/19  1412   POC GLUCOSE mg/dl 74         Results from last 7 days   Lab Units 11/24/19  1348   LACTIC ACID mmol/L 1 7           * I Have Reviewed All Lab Data Listed Above  * Additional Pertinent Lab Tests Reviewed: All Labs Within Last 24 Hours Reviewed    Imaging:    Imaging Reports Reviewed Today Include: none  Imaging Personally Reviewed by Myself Includes:  none    Recent Cultures (last 7 days):           Last 24 Hours Medication List:     Current Facility-Administered Medications:  bisoprolol 5 mg Oral Daily Raghu Rosado PA-C    lisinopril 10 mg Oral Daily Raghu Rosado PA-C    nicotine 1 patch Transdermal Daily Raghu Rosado PA-C    ondansetron 4 mg Intravenous Q6H PRN Raghu Rosado PA-C    sodium chloride 100 mL/hr Intravenous Continuous Raghu Rosado PA-C Last Rate: 100 mL/hr (11/24/19 2030)        Today, Patient Was Seen By: Angelique Spurling, PA-C    ** Please Note: Dictation voice to text software may have been used in the creation of this document   **

## 2019-11-25 NOTE — ASSESSMENT & PLAN NOTE
Blood pressure is stable  He reports that he has not been taking his prescribed blood pressure medication (bisoprolol, lisinopril)  Will continue home medication  Monitor closely  Encourage close PCP follow up post admission

## 2019-11-25 NOTE — ASSESSMENT & PLAN NOTE
He presented to the ER via EMS for possible overdose  He admits to taking undisclosed number of benadryl, Imodium, Mucinex pills  He received magnesium 2 g IV and calcium gluconate 2 g IV in the ER  ER attending discussed case with toxicology  EKG showed prolonged QT interval in the ED  Continue one-to-one observation  Monitor electrolytes replace as needed  Repeat EKG showed QT interval less than 500  The patient is medically stable to discharge to inpatient psychiatric facility  Psych consultation appreciated, the patient signed a 201 form but meets criteria for 302 if he changes his mind  Patient was discharged to [facility] behavioral health unit for inpatient psychiatric treatment

## 2019-11-25 NOTE — DISCHARGE SUMMARY
Discharge- Christo West 1975, 38 y o  male MRN: [id number]    Unit/Bed#: 420-01 Encounter: [id number]    Primary Care Provider: Sylvan Bernheim, DO   Date and time admitted to hospital: 11/24/2019  1:25 PM        * Intentional drug overdose [facility])  Assessment & Plan  He presented to the ER via EMS for possible overdose  He admits to taking undisclosed number of benadryl, Imodium, Mucinex pills  He received magnesium 2 g IV and calcium gluconate 2 g IV in the ER  ER attending discussed case with toxicology  EKG showed prolonged QT interval in the ED  Continue one-to-one observation  Monitor electrolytes replace as needed  Repeat EKG showed QT interval less than 500  The patient is medically stable to discharge to inpatient psychiatric facility  Psych consultation appreciated, the patient signed a 201 form but meets criteria for 302 if he changes his mind  Patient was discharged to [address] behavioral health unit for inpatient psychiatric treatment  Essential hypertension  Assessment & Plan  Blood pressure is stable  He reports that he has not been taking his prescribed blood pressure medication (bisoprolol, lisinopril)  Will continue home medication  Monitor closely  Encourage close PCP follow up post admission    Chronic back pain  Assessment & Plan  He does admit to chronic back pain  He states that he does not currently have any back pain    Tobacco abuse  Assessment & Plan  Smoking cessation counseling   Nicotine patch      Discharging Physician / Practitioner: Levy Britt PA-C  PCP: Sylvan Bernheim, DO  Admission Date:   Admission Orders (From admission, onward)     Ordered        11/24/19 1822  Place in Observation  Once         Signed and Held  DISCHARGE READMIT Admit Patient to [address] [place] [address] (use with Discharge Readmit Navigator in [facility] [address] Discharge Readmit scenario including from any IP unit or different campus ED to [facility])  Nurse to release order when pt   arrives to [facility]   Once                   Discharge Date: 11/25/19    Resolved Problems  Date Reviewed: 11/25/2019    None          Consultations During Hospital Stay:  · Tele-psychiatry    Procedures Performed:   · none    Significant Findings / Test Results:   Xr Chest 1 View Portable    Result Date: 11/24/2019  Impression: No acute cardiopulmonary disease  Workstation performed: [id number]     Ct Head Without Contrast    Result Date: 11/24/2019  · Impression: No acute intracranial abnormality  Workstation performed: [id number]     Incidental Findings:   · none     Test Results Pending at Discharge (will require follow up):   · none     Outpatient Tests Requested:  · none    Complications:  none    Reason for Admission: medication overdose     Hospital Course:     Austin De Leon is a 38 y o  male patient who originally presented to the hospital on 11/24/2019 due to drug overdose  As per EMS he was found on the side of the road stated that he he was afraid that he was having heart attack  He also verbalized that he was afraid that he would die  He does admit that he took an undisclosed number of Benadryl, Imodium, and Mucinex pills  As per EMS he appeared quite agitated at the scene  He was given Versed 5 mg  Upon arrival to the emergency room he was still a bit agitated and reaching for invisible objects  ER attending discussed case with Dr Linda Muniz (Toxicology)  Dr Linda Muniz recommended admission on observation with telemetry monitoring and optimization of electrolytes  He also recommended monitoring EKGs for change in the QT intervals  He received calcium gluconate 2 g IV and magnesium sulfate 2 g emergency room  Also received normal saline 2 L in emergency room       Of note he was asked about possible drug use to which he  stated that he has not been using drugs  However his urine drug screen is positive for benzos, opiates and phencyclidine    He additionally when I ask if he wants to hurt himself of a he stated "not any more"  He was also asked if he took the pills try to hurt himself and his answer was no  He states that he had  "some things" going on  He also reported that he is a  but he is not currently working       Patient is being admitted on observation status [facility] care for further work up and management of intentional overdose    Please see above list of diagnoses and related plan for additional information  Condition at Discharge: good     Discharge Day Visit / Exam:     * Please refer to separate progress note for these details *      Discharge instructions/Information to patient and family:   See after visit summary for information provided to patient and family  Provisions for Follow-Up Care:  See after visit summary for information related to follow-up care and any pertinent home health orders  Disposition:     Inpatient Psychiatry at [address] behavioral health unit    For Discharges to [facility]:   · Not Applicable to this Patient - Not Applicable to this Patient    Planned Readmission: yes     Discharge Statement:  I spent 20 minutes discharging the patient  This time was spent on the day of discharge  I had direct contact with the patient on the day of discharge  Greater than 50% of the total time was spent examining patient, answering all patient questions, arranging and discussing plan of care with patient as well as directly providing post-discharge instructions  Additional time then spent on discharge activities  Discharge Medications:  See after visit summary for reconciled discharge medications provided to patient and family        ** Please Note: This note has been constructed using a voice recognition system **

## 2019-11-25 NOTE — ASSESSMENT & PLAN NOTE
He presented to the ER via EMS for possible overdose  He admits to taking disclose number of benadryl, Imodium, Mucinex pills  He received magnesium 2 g IV and calcium gluconate 2 g IV in the ER  ER attending discussed case with toxicology  Admit on observation  Telemetry monitoring  Check EKG to evaluated for widening QT interval  Continue one-to-one observation  Monitor electrolytes replace as needed  A m   Labs  Psych consult  Supportive care

## 2019-11-25 NOTE — PLAN OF CARE
Problem: Potential for Falls  Goal: Patient will remain free of falls  Description  INTERVENTIONS:  - Assess patient frequently for physical needs  -  Identify cognitive and physical deficits and behaviors that affect risk of falls    -  [place] fall precautions as indicated by assessment   - Educate patient/family on patient safety including physical limitations  - Instruct patient to call for assistance with activity based on assessment  - Modify environment to reduce risk of injury  - Consider OT/PT consult to assist with strengthening/mobility  Outcome: Progressing     Problem: PAIN - ADULT  Goal: Verbalizes/displays adequate comfort level or baseline comfort level  Description  Interventions:  - Encourage patient to monitor pain and request assistance  - Assess pain using appropriate pain scale  - Administer analgesics based on type and severity of pain and evaluate response  - Implement non-pharmacological measures as appropriate and evaluate response  - Consider cultural and social influences on pain and pain management  - Notify physician/advanced practitioner if interventions unsuccessful or patient reports new pain  Outcome: Progressing     Problem: INFECTION - ADULT  Goal: Absence or prevention of progression during hospitalization  Description  INTERVENTIONS:  - Assess and monitor for signs and symptoms of infection  - Monitor lab/diagnostic results  - Monitor all insertion sites, i e  indwelling lines, tubes, and drains  - Monitor endotracheal if appropriate and nasal secretions for changes in amount and color  - [place] appropriate cooling/warming therapies per order  - Administer medications as ordered  - Instruct and encourage patient and family to use good hand hygiene technique  - Identify and instruct in appropriate isolation precautions for identified infection/condition  Outcome: Progressing  Goal: Absence of fever/infection during neutropenic period  Description  INTERVENTIONS:  - Monitor WBC    Outcome: Progressing     Problem: SAFETY ADULT  Goal: Maintain or return to baseline ADL function  Description  INTERVENTIONS:  -  Assess patient's ability to carry out ADLs; assess patient's baseline for ADL function and identify physical deficits which impact ability to perform ADLs (bathing, care of mouth/teeth, toileting, grooming, dressing, etc )  - Assess/evaluate cause of self-care deficits   - Assess range of motion  - Assess patient's mobility; develop plan if impaired  - Assess patient's need for assistive devices and provide as appropriate  - Encourage maximum independence but intervene and supervise when necessary  - Involve family in performance of ADLs  - Assess for home care needs following discharge   - Consider OT consult to assist with ADL evaluation and planning for discharge  - Provide patient education as appropriate  Outcome: Progressing  Goal: Maintain or return mobility status to optimal level  Description  INTERVENTIONS:  - Assess patient's baseline mobility status (ambulation, transfers, stairs, etc )    - Identify cognitive and physical deficits and behaviors that affect mobility  - Identify mobility aids required to assist with transfers and/or ambulation (gait belt, sit-to-stand, lift, walker, cane, etc )  - [place] fall precautions as indicated by assessment  - Record patient progress and toleration of activity level on Mobility SBAR; progress patient to next Phase/Stage  - Instruct patient to call for assistance with activity based on assessment  - Consider rehabilitation consult to assist with strengthening/weightbearing, etc   Outcome: Progressing     Problem: DISCHARGE PLANNING  Goal: Discharge to home or other facility with appropriate resources  Description  INTERVENTIONS:  - Identify barriers to discharge w/patient and caregiver  - Arrange for needed discharge resources and transportation as appropriate  - Identify discharge learning needs (meds, wound care, etc )  - Arrange for interpretive services to assist at discharge as needed  - Refer to Case Management Department for coordinating discharge planning if the patient needs post-hospital services based on physician/advanced practitioner order or complex needs related to functional status, cognitive ability, or social support system  Outcome: Progressing     Problem: Knowledge Deficit  Goal: Patient/family/caregiver demonstrates understanding of disease process, treatment plan, medications, and discharge instructions  Description  Complete learning assessment and assess knowledge base    Interventions:  - Provide teaching at level of understanding  - Provide teaching via preferred learning methods  Outcome: Progressing     Problem: RESPIRATORY - ADULT  Goal: Achieves optimal ventilation and oxygenation  Description  INTERVENTIONS:  - Assess for changes in respiratory status  - Assess for changes in mentation and behavior  - Position to facilitate oxygenation and minimize respiratory effort  - Oxygen administered by appropriate delivery if ordered  - Initiate smoking cessation education as indicated  - Encourage broncho-pulmonary hygiene including cough, deep breathe, Incentive Spirometry  - Assess the need for suctioning and aspirate as needed  - Assess and instruct to report SOB or any respiratory difficulty  - Respiratory Therapy support as indicated  Outcome: Progressing     Problem: METABOLIC, FLUID AND ELECTROLYTES - ADULT  Goal: Electrolytes maintained within normal limits  Description  INTERVENTIONS:  - Monitor labs and assess patient for signs and symptoms of electrolyte imbalances  - Administer electrolyte replacement as ordered  - Monitor response to electrolyte replacements, including repeat lab results as appropriate  - Instruct patient on fluid and nutrition as appropriate  Outcome: Progressing  Goal: Fluid balance maintained  Description  INTERVENTIONS:  - Monitor labs   - Monitor I/O and WT  - Instruct patient on fluid and nutrition as appropriate  - Assess for signs & symptoms of volume excess or deficit  Outcome: Progressing  Goal: Glucose maintained within target range  Description  INTERVENTIONS:  - Monitor Blood Glucose as ordered  - Assess for signs and symptoms of hyperglycemia and hypoglycemia  - Administer ordered medications to maintain glucose within target range  - Assess nutritional intake and initiate nutrition service referral as needed  Outcome: Progressing     Problem: MUSCULOSKELETAL - ADULT  Goal: Maintain or return mobility to safest level of function  Description  INTERVENTIONS:  - Assess patient's ability to carry out ADLs; assess patient's baseline for ADL function and identify physical deficits which impact ability to perform ADLs (bathing, care of mouth/teeth, toileting, grooming, dressing, etc )  - Assess/evaluate cause of self-care deficits   - Assess range of motion  - Assess patient's mobility  - Assess patient's need for assistive devices and provide as appropriate  - Encourage maximum independence but intervene and supervise when necessary  - Involve family in performance of ADLs  - Assess for home care needs following discharge   - Consider OT consult to assist with ADL evaluation and planning for discharge  - Provide patient education as appropriate  Outcome: Progressing  Goal: Maintain proper alignment of affected body part  Description  INTERVENTIONS:  - Support, maintain and protect limb and body alignment  - Provide patient/ family with appropriate education  Outcome: Progressing

## 2019-11-25 NOTE — ASSESSMENT & PLAN NOTE
He does admit to chronic back pain  He states that he does not currently have any back pain  Will monitor

## 2019-11-25 NOTE — SOCIAL WORK
16:10 I received a call from the rn and clinical was given, Odilia Samanta was received @16:55 pt approved to go today 11/25/19 approved 3 days  nrd 11/27/19  Auth# [id number]

## 2019-11-25 NOTE — H&P
[facility]  H&P- Alonzo Nickel 1975, 38 y o  male MRN: [id number]    Unit/Bed#: 420-01 Encounter: [id number]    Primary Care Provider: Ky Jordan DO   Date and time admitted to hospital: 11/24/2019  1:25 PM        * Intentional drug overdose [facility])  Assessment & Plan  He presented to the ER via EMS for possible overdose  He admits to taking disclose number of benadryl, Imodium, Mucinex pills  He received magnesium 2 g IV and calcium gluconate 2 g IV in the ER  ER attending discussed case with toxicology  Admit on observation  Telemetry monitoring  Check EKG to evaluated for widening QT interval  Continue one-to-one observation  Monitor electrolytes replace as needed  A m  Labs  Psych consult  Supportive care        Essential hypertension  Assessment & Plan  Blood pressure is stable  He reports that he has not been taking his prescribed blood pressure medication (bisoprolol, lisinopril)  Will continue home medication  Monitor closely  Encourage close PCP follow up post admission    Chronic back pain  Assessment & Plan  He does admit to chronic back pain  He states that he does not currently have any back pain  Will monitor     Tobacco abuse  Assessment & Plan  Smoking cessation counseling   Nicotine patch        VTE Prophylaxis: Pharmacologic VTE Prophylaxis contraindicated due to Low risk after VTE screen  / reason for no mechanical VTE prophylaxis Low risk afer VTE screen   Code Status: Level 1- Full Code  POLST: POLST is not applicable to this patient  Discussion with family: None    Anticipated Length of Stay:  Patient will be admitted on an Observation basis with an anticipated length of stay of  < 2 midnights  Justification for Hospital Stay: Intentional Drug overdose    Total Time for Visit, including Counseling / Coordination of Care: 30 minutes  Greater than 50% of this total time spent on direct patient counseling and coordination of care      Chief Complaint:   Drug Overdose-Intentional    History of Present Illness:    Olinda Quiros is a 38 y o  male who presents to the emergency room via EMS for evaluation of drug overdose  As per EMS he was found on the side of the road stated that he he was afraid that he was having heart attack  He also verbalized that he was afraid that he would die  He does admit that he took an undisclosed number of Benadryl, Imodium, and Mucinex pills  As per EMS he appeared quite agitated at the scene  He was given Versed 5 mg  Upon arrival to the emergency room he was still a bit agitated and reaching for invisible objects  Labs completed in emergency room with results as shown below  Chest x-ray completed with results as shown below  CT head completed with results as shown below  ER attending discussed case with Dr Brent Olivares (Toxicology)  Dr Brent Olivares recommended admission on observation with telemetry monitoring and optimization of electrolytes  He also recommended monitoring EKGs for change in the QT intervals  He received calcium gluconate 2 g IV and magnesium sulfate 2 g emergency room  Also received normal saline 2 L in emergency room  Of note he was asked about possible drug use to which he  stated that he has not been using drugs  However his urine drug screen is positive for benzos, opiates and phencyclidine  He additionally when I ask if he wants to hurt himself of a he stated "not any more"  He was also asked if he took the pills try to hurt himself and his answer was no  He states that he had  "some things" going on  He also reported that he is a  but he is not currently working  Patient is being admitted on observation status [facility] level care for further work up and management of intentional overdose  Review of Systems:    Review of Systems   Constitutional: Positive for appetite change  Negative for chills, diaphoresis and fever     HENT: Negative for congestion, sore throat, tinnitus, trouble swallowing and voice change  Eyes: Negative for photophobia, pain, redness and visual disturbance  Respiratory: Negative for cough, chest tightness, shortness of breath and wheezing  Cardiovascular: Negative for chest pain, palpitations and leg swelling  Gastrointestinal: Negative for abdominal pain, diarrhea, nausea and vomiting  Endocrine: Negative for polydipsia and polyuria  Genitourinary: Negative for difficulty urinating, dysuria, flank pain, frequency and hematuria  Musculoskeletal: Positive for back pain  Negative for arthralgias, gait problem and joint swelling  Skin: Negative for color change, pallor, rash and wound  Neurological: Negative for dizziness, speech difficulty, weakness and headaches  Psychiatric/Behavioral: Positive for suicidal ideas  Negative for agitation and confusion  The patient is not nervous/anxious  Past Medical and Surgical History:     Past Medical History:   Diagnosis Date    Biceps tendon tear     Carpal tunnel syndrome     Chronic pain     back    Hypertension        Past Surgical History:   Procedure Laterality Date    CARPAL TUNNEL RELEASE      ROTATOR CUFF REPAIR      ULNAR NERVE REPAIR         Meds/Allergies:    Prior to Admission medications    Medication Sig Start Date End Date Taking?  Authorizing Provider   ALPRAZolam [place]) 0 5 mg tablet Take by mouth daily at bedtime as needed for anxiety    Historical Provider, MD   bisoprolol (ZEBETA) 5 mg tablet Take 5 mg by mouth daily    Historical Provider, MD   ergocalciferol (VITAMIN D2) 50,000 units Take 1 capsule by mouth once a week for 8 doses 12/7/17 1/26/18  Mignon Chavira DO   lisinopril (ZESTRIL) 10 mg tablet Take 10 mg by mouth daily    Historical Provider, MD   nicotine (NICODERM CQ) 14 mg/24hr TD 24 hr patch Place 1 patch on the skin daily 12/7/17   Mignon Chavira DO   oxyCODONE (OxyCONTIN) 15 mg 12 hr tablet Take 10 mg by mouth every 12 (twelve) hours    Historical Provider, MD prochlorperazine (COMPAZINE) 5 mg tablet Take 1 tablet (5 mg total) by mouth every 6 (six) hours as needed for nausea or vomiting for up to 3 days 1/27/18 1/30/18  Wilhemena Nephew, SCOTT     I have reviewed home medications with patient personally  Allergies: No Known Allergies    Social History:     Marital Status: /Civil Union   Occupation: Currently Unemployed  Patient Pre-hospital Living Situation: Lives with family  Patient Pre-hospital Level of Mobility: Active  Patient Pre-hospital Diet Restrictions: None reported  Substance Use History:   Social History     Substance and Sexual Activity   Alcohol Use No    Alcohol/week: 0 0 standard drinks    Frequency: Never    Drinks per session: Patient refused    Binge frequency: Never    Comment: 0     Social History     Tobacco Use   Smoking Status Current Every Day Smoker    Packs/day: 1 00    Years: 25 00    Pack years: 25 00   Smokeless Tobacco Never Used     Social History     Substance and Sexual Activity   Drug Use No    Comment: pt denies all substances        Family History:    History reviewed  No pertinent family history  Physical Exam:     Vitals:   Blood Pressure: 145/99 (11/24/19 1902)  Pulse: 81 (11/24/19 1902)  Temperature: 98 2 °F (36 8 °C) (11/24/19 1902)  Temp Source: Oral (11/24/19 1902)  Respirations: 18 (11/24/19 1902)  Height: 5' 11" (180 3 cm) (11/24/19 1902)  Weight - Scale: 89 9 kg (198 lb 3 1 oz) (11/24/19 1936)  SpO2: 96 % (11/24/19 1902)    Physical Exam   Constitutional: He is oriented to person, place, and time  No distress  HENT:   Head: Normocephalic and atraumatic  Mouth/Throat: Oropharynx is clear and moist    Eyes: Pupils are equal, round, and reactive to light  EOM are normal  Right eye exhibits no discharge  Left eye exhibits no discharge  No scleral icterus  Neck: No JVD present  Cardiovascular: Normal rate, regular rhythm and intact distal pulses     Pulmonary/Chest: Effort normal and breath sounds normal  No stridor  No respiratory distress  He has no wheezes  He has no rales  Abdominal: Soft  Bowel sounds are normal  He exhibits no distension and no mass  There is no tenderness  There is no guarding  Musculoskeletal: Normal range of motion  He exhibits no edema, tenderness or deformity  Lymphadenopathy:     He has no cervical adenopathy  Neurological: He is oriented to person, place, and time  Skin: Skin is warm and dry  Capillary refill takes less than 2 seconds  No rash noted  He is not diaphoretic  No erythema  Vitals reviewed  Additional Data:     Lab Results: I have personally reviewed pertinent reports  Results from last 7 days   Lab Units 11/24/19  1348   WBC Thousand/uL 5 69   HEMOGLOBIN g/dL 13 0   HEMATOCRIT % 41 5   PLATELETS Thousands/uL 252   NEUTROS PCT % 69   LYMPHS PCT % 21   MONOS PCT % 6   EOS PCT % 2     Results from last 7 days   Lab Units 11/24/19  1348   SODIUM mmol/L 140   POTASSIUM mmol/L 3 8   CHLORIDE mmol/L 106   CO2 mmol/L 23   BUN mg/dL 17   CREATININE mg/dL 1 21   ANION GAP mmol/L 11   CALCIUM mg/dL 7 9*   ALBUMIN g/dL 3 7   TOTAL BILIRUBIN mg/dL 0 40   ALK PHOS U/L 56   ALT U/L 15   AST U/L 19   GLUCOSE RANDOM mg/dL 87     Results from last 7 days   Lab Units 11/24/19  1348   INR  1 07     Results from last 7 days   Lab Units 11/24/19  1412   POC GLUCOSE mg/dl 74         Results from last 7 days   Lab Units 11/24/19  1348   LACTIC ACID mmol/L 1 7       Imaging: I have personally reviewed pertinent reports  XR chest 1 view portable   Final Result by Erica Alonzo MD (11/24 1642)      No acute cardiopulmonary disease  Workstation performed: [id number]         CT head without contrast   Final Result by Juventino Younger MD (11/24 1414)      No acute intracranial abnormality                    Workstation performed: [id number]             EKG, Pathology, and Other Studies Reviewed on Admission:   · EKG: Normal sinus rhythm at rate of 91 bpm    Allscripts / Epic Records Reviewed: Yes     ** Please Note: This note has been constructed using a voice recognition system   **

## 2019-11-25 NOTE — SOCIAL WORK
I called Ivan Christina at crisis and gave him the auth# and transport time for 8:30pm [facility] transport, I met with the pt to make him aware he was accepted at [facility]  and an Celia  was received and  thathe has an 8:30pm  time, I asked if he needed me to call his wife with the this information and I was told to not call her, I respected his wishes  Plan is for the pt to be d/c tonight to [address] [place] [address] [facility] accepted the pt, rn and doctor were made aware of the d/c time and acceptance, pt is in agreement with the d/c and d/c plan to [facility]  Hermann Desouza

## 2019-11-25 NOTE — CONSULTS
Name:  Yosi Montero                                                                                       : 73  Date: 19                                                                                       Time: 12:30 PM  Location of patient: [address] [facility] inpatient                                                                              Location of doctor: [address] [place] [address]   This evaluation was conducted via telepsychiatry with the assistance of onsite staff    Chief Complaint:     "I wanted to get numb "    History of Present Illness: The patient is a 76-year-old man with no history of suicide attempts, history of depression, addiction problems and one psychiatric hospitalization 10 years ago, who was brought to the hospital by EMS after an intentional overdose on Mucinex DM, Advil PM and Imodium  In the hospital initially he was in delirium, agitated, hallucinating, but that has resolved  Psychiatric consult was requested to clarify the suicide risk  According to his nurse initially the patient reported that this was a suicide attempt, then denied it and said that it was just to feel "numb "  The patient reported that yesterday he took these pills, about 45 of them in total and then went out with his wife to [facility], felt that he had a panic attack and thinking that he was dying, so he flagged for help  He reported that he took the pills as they make him feel numb, denied suicide attempt  He is currently on telemetry as the QTC is prolonged    He denies feeling depressed, sleeps 8-9 hours per night, normal appetite and energy level, unsure about feelings of worthlessness, tells me that he does bad things in terms of taking pills in the context of his addiction, denied feelings of hopelessness, denied suicidal thoughts, passive or active currently or within the last month, denied homicidal thoughts, no delusions were elicited, denied hallucinations, screening for darby is negative  Screening for PTSD is negative  He denied recent stressors  Collateral: His wife reported that he is abusing the Mucinex and Imodium and typically takes about 8 pills on a weekly basis  She was initially unaware how many pills he took this time, later on he told her that he took about 36  According to her 2 weeks ago he told her that he wanted to die as there was no purpose in this life for him  She is not aware of more recent suicidal statements  According to her he may be depressed as he is not doing anything, not enjoying things that he previously enjoyed doing like hunting  She reported that to 3 months ago and a week ago she threatened to separate him because of his addiction problems  SI/ Self harm: Denied    HI/Violence: Denied    Trauma history: Denied    Access to weapons: The patient has access to guns in his father's house, he is a jamee    Legal: Denied    Psychiatric History/Treatment History: (Inpatient? How many? Any commitments? Last admit? Current treatment? Last visit)   The patient denied history of suicide attempts, his wife is not aware of suicide attempts, he told me that he was never hospitalized in a psychiatric unit, but his wife reported that he was hospitalized 10 years ago, then he admitted that he was  According to her he was treated with Remeron and Lamictal at that time, he was taking Ambien and possibly overdose, but she is not aware of this was a suicide attempt or not  Drug/Alcohol History: (What? Current use? Rehabs? If so when?)   The patient has history of abusing oxycodone when it was prescribed for chronic back pain, apparently it is still being prescribed by his primary care doctor  He reports that he is taking Imodium, Mucinex and Advil PM recreationally, about weekly    Denies alcohol use    Medical History:   Hypertension  Medications & Freq:   Reviewed in the system in epic, he is currently on no psychiatric medications  He tells me that he is prescribed oxycodone and Xanax by his primary care doctor    Allergies:   Denied  Sleep quality:     see above        Sleep quantity: see above    Family Psych History/History of suicide: (suicides, mental health including substance abuse)  The patient denied, according to his wife his mother has depression    Social History: (who do they live with) the patient lives with his wife and 30-year-old son   Employment: Unemployed currently, 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, not because of drug problems, his wife told me that he was made to resign because he was falling asleep at work in the context of his substance abuse  Education: [employer]   Stressors: The patient denied, his wife reported that a week ago and 2-3 months ago she threatened separation in the context of his substance abuse problems  There was  about a year ago  Strengths/supports: Family    Mental Status Exam:   Appearance and attire: The patient is dressed in a hospital gown  Attitude and behavior: Cooperative, no signs of psychomotor agitation or retardation, no signs of internal stimulation or distractibility  Speech: Normal in rate, volume and rhythm  Affect and mood: Affect is constricted, normal intensity, appropriate, mood is described as normal  Association and thought processes: Normal  Thought content: No delusions elicited  Perception: Denied hallucinations  Sensorium, memory, and orientation: Alert and oriented x 3, Knows the name of the president  Intellectual functioning: Appears average  Insight and judgment: Impaired    Impression/Risk Assessment:   27-year-old man with history of addiction to opioids, dextromethorphan, Benadryl and Imodium was seen after an  overdose  He is not in delirium anymore    It is not very clear what happened in terms of his overdose, suicidal overdose versus trying to get high as the reports are conflicting  I do not get a sense that he is honest with me as he told me that there are no recent stressors and his relationship is okay, that he has not been having wish to die recently and that he was not made to resign because of substance use  His wife told me a different story regarding those topics  It looks to me that he has been having stressors in the context of his relationships and likely symptoms of depression in regards to that, with ongoing substance use  The risk for suicide  appears elevated and for that reason psychiatric hospitalization is indicated        Diagnosis:   Adjustment disorder with depressed mood, rule out major depressive disorder  Delirium, resolved, dextromethorphan, Benadryl and  opioid Use disorders    Treatment Recommendations: (summarize recommendations)   The patient agreed to voluntary psychiatric hospitalization, he would fulfill criteria for 302 if he changes his mind  Observation level - continue one-to-one for suicide prevention      Pharmacological: (specifically note medication recommendations, e g  restart home meds, start antipsychotic, etc )  Hold psychiatric medications in the context of the recent overdose    Therapy: (specifically note recommended therapy, e g  supportive, substance abuse, etc )    He might benefit from substance abuse therapy  Level of Care: (inpatient, PHP, IOP, outpatient)  Inpatient    _________________________  Samantha Washington MD

## 2019-11-25 NOTE — UTILIZATION REVIEW
Initial Clinical Review    Admission: Date/Time/Statement:   Orders Placed This Encounter   Procedures    Place in Observation     Standing Status:   Standing     Number of Occurrences:   1     Order Specific Question:   Admitting Physician     Answer:   Casie Cardoso     Order Specific Question:   Level of Care     Answer:   Med Surg [16]     Order Specific Question:   Bed request comments     Answer:   tele     ED Arrival Information     Expected Arrival Acuity Means of Arrival Escorted By Service Admission Type    - 11/24/2019 13:25 Emergent [address] Emergency    Arrival Complaint    OVERDOSE        Chief Complaint   Patient presents with    Overdose - Intentional     pt is very confused and is not answering any questions; pt is denying taking any substances; found on the side of the road stating that he felt like he was having a heart attack     Assessment/Plan:   EMS providers report the patient was attempting to flag down help  He told them that he was afraid he was having a heart attack  He was afraid he would die  On their evaluation, he was very agitated, unable to give history except fact that he had taken a lot of Tylenol p m     They were able to give him a total of 5 mg of Versed which made him calm enough for them to get an EKG intravenous access    Patient states that he does not want to hurt himself or anyone else  Patient states that he took "a bunch of tylenol pm and mucinex because "he likes the way it makes him feel"  Patient states he has been going through relationship issues and it "numbs" the pain and makes him feel "nothing" and that is why he takes it  Patient denies any other illegal substances    Intentional drug overdose [facility])  Assessment & Plan  He presented to the ER via EMS for possible overdose  He admits to taking disclose number of benadryl, Imodium, Mucinex pills  He received magnesium 2 g IV and calcium gluconate 2 g IV in the ER  ER attending discussed case with toxicology  Admit on observation  Telemetry monitoring  Check EKG to evaluated for widening QT interval  Continue one-to-one observation  Monitor electrolytes replace as needed  A m   Labs  Psych consult  Supportive care  ED Triage Vitals   Temperature Pulse Respirations Blood Pressure SpO2   11/24/19 1332 11/24/19 1332 11/24/19 1332 11/24/19 1332 11/24/19 1332   98 7 °F (37 1 °C) 80 (!) 24 131/89 97 %      Temp Source Heart Rate Source Patient Position - Orthostatic VS BP Location FiO2 (%)   11/24/19 1902 11/24/19 1332 11/24/19 1332 11/24/19 1332 --   Oral Monitor Sitting Right arm       Pain Score       11/24/19 1330       No Pain        Wt Readings from Last 1 Encounters:   11/25/19 89 9 kg (198 lb 3 1 oz)     Additional Vital Signs:   Date/Time  Temp  Pulse  Resp  BP  MAP (mmHg)  SpO2  O2 Device  Patient Position - Orthostatic VS   11/25/19 0700  98 2 °F (36 8 °C)  81  18  125/80    96 %    Lying   11/24/19 23:21:48  97 4 °F (36 3 °C)Abnormal   86  18      96 %       11/24/19 1918              None (Room air)     11/24/19 19:02:26  98 2 °F (36 8 °C)  81  18  145/99  114  96 %  None (Room air)  Lying   Pertinent Labs/Diagnostic Test Results:   Results from last 7 days   Lab Units 11/25/19  0649 11/24/19  1348   WBC Thousand/uL 4 97 5 69   HEMOGLOBIN g/dL 13 7 13 0   HEMATOCRIT % 44 3 41 5   PLATELETS Thousands/uL 253 252   NEUTROS ABS Thousands/µL  --  3 99     Results from last 7 days   Lab Units 11/25/19  0649 11/25/19  0023 11/24/19  1348   SODIUM mmol/L 139 141 140   POTASSIUM mmol/L 4 0 3 8 3 8   CHLORIDE mmol/L 110* 110* 106   CO2 mmol/L 21 22 23   ANION GAP mmol/L 8 9 11   BUN mg/dL 13 12 17   CREATININE mg/dL 0 85 0 85 1 21   EGFR ml/min/1 73sq m 105 105 72   CALCIUM mg/dL 8 0* 7 5* 7 9*   MAGNESIUM mg/dL 2 1 2 0 1 7   PHOSPHORUS mg/dL 3 3  --   --      Results from last 7 days   Lab Units 11/25/19  0649 11/24/19  1348   AST U/L 13 19   ALT U/L 13 15   ALK PHOS U/L 48 56   TOTAL PROTEIN g/dL 6 6 7 3   ALBUMIN g/dL 3 2* 3 7   TOTAL BILIRUBIN mg/dL 0 40 0 40   AMMONIA umol/L  --  23     Results from last 7 days   Lab Units 11/24/19  1412   POC GLUCOSE mg/dl 74     Results from last 7 days   Lab Units 11/25/19  0649 11/25/19  0023 11/24/19  1348   GLUCOSE RANDOM mg/dL 90 93 87      Results from last 7 days   Lab Units 11/24/19  1449   PH ART  7 299*   PCO2 ART mm Hg 35 3*   PO2 ART mm Hg 83 0   HCO3 ART mmol/L 16 9*   BASE EXC ART mmol/L -8 6   O2 CONTENT ART mL/dL 17 7   O2 HGB, ARTERIAL % 93 7*   ABG SOURCE  Artery     Results from last 7 days   Lab Units 11/24/19  1348   PH MUKESH  7 275*     Results from last 7 days   Lab Units 11/24/19  1348   CK TOTAL U/L 201   CK MB INDEX % 1 2   CK MB ng/mL 2 4     Results from last 7 days   Lab Units 11/24/19  1348   TROPONIN I ng/mL <0 02     Results from last 7 days   Lab Units 11/24/19  1348   PROTIME seconds 14 0   INR  1 07   PTT seconds 24     Results from last 7 days   Lab Units 11/24/19  1348   TSH 3RD GENERATON uIU/mL 0 940     Results from last 7 days   Lab Units 11/24/19  1348   LACTIC ACID mmol/L 1 7     Results from last 7 days   Lab Units 11/24/19  1448   CLARITY UA  Clear   COLOR UA  Yellow   SPEC GRAV UA  >=1 030   PH UA  6 0   GLUCOSE UA mg/dl Negative   KETONES UA mg/dl Negative   BLOOD UA  Negative   PROTEIN UA mg/dl 30 (1+)*   NITRITE UA  Negative   BILIRUBIN UA  Negative   UROBILINOGEN UA E U /dl 0 2   LEUKOCYTES UA  Trace*   WBC UA /hpf 0-1*   RBC UA /hpf None Seen   BACTERIA UA /hpf Occasional   EPITHELIAL CELLS WET PREP /hpf None Seen     Results from last 7 days   Lab Units 11/24/19  1449   AMPH/METH  Negative   BARBITURATE UR  Negative   BENZODIAZEPINE UR  Positive*   COCAINE UR  Negative   METHADONE URINE  Negative   OPIATE UR  Positive*   PCP UR  Positive*   THC UR  Negative     Results from last 7 days   Lab Units 11/24/19  1348   ETHANOL LVL mg/dL <3   ACETAMINOPHEN LVL ug/mL <2*   SALICYLATE LVL mg/dL <3* 11/24  Ct head=No acute intracranial abnormality  Cxr= No acute cardiopulmonary disease   ekg=ECG rate:  91    ECG rate assessment: normal    Rhythm:     Rhythm: sinus rhythm    Ectopy:     Ectopy: none    ED Treatment:   Medication Administration from 11/24/2019 1325 to 11/24/2019 1901       Date/Time Order Dose Route     11/24/2019 1351 sodium chloride 0 9 % bolus 1,000 mL 1,000 mL Intravenous     11/24/2019 1803 calcium gluconate 2 g in sodium chloride 0 9 % 100 mL IVPB 2 g Intravenous     11/24/2019 1640 magnesium sulfate 2 g/50 mL IVPB (premix) 2 g 2 g Intravenous     11/24/2019 1639 sodium chloride 0 9 % bolus 1,000 mL 1,000 mL Intravenous        Past Medical History:   Diagnosis Date    Biceps tendon tear     Carpal tunnel syndrome     Chronic pain     back    Hypertension      Present on Admission:   Essential hypertension   Tobacco abuse   Intentional drug overdose ([facility] )   Chronic back pain  Admitting Diagnosis: Intentional drug overdose, initial encounter ([place] [address] ) [T50 902A]  Overdose, intentional self-harm, initial encounter ([place] [address] ) [facility]  Age/Sex: 38 y o  male  Admission Orders:  Telemetry  1:1 observation  Consult toxicology  Consult psyche  Scheduled Medications:  bisoprolol 5 mg Oral Daily   lisinopril 10 mg Oral Daily   nicotine 1 patch Transdermal Daily   Continuous IV Infusions:  sodium chloride 100 mL/hr Intravenous Continuous     PRN Meds:  ondansetron 4 mg Intravenous Q6H PRN     Network Utilization Review Department  [email or web address]  ATTENTION: Please call with any questions or concerns to [phone] and carefully listen to the prompts so that you are directed to the right person  All voicemails are confidential   Umang Melendez all requests for admission clinical reviews, approved or denied determinations and any other requests to dedicated fax number below belonging to the campus where the patient is receiving treatment    FACILITY NAME UR FAX NUMBER ADMISSION DENIALS (Administrative/Medical Necessity) [id number] [facility] (Maternity/NICU/Pediatrics) [place] [phone]   Wes Elias [phone]   Porfirio Torres [phone]   [facility] [address] [place] [address] [phone]   [facility] [address] [phone]

## 2019-11-25 NOTE — ASSESSMENT & PLAN NOTE
He presented to the ER via EMS for possible overdose  He admits to taking undisclosed number of benadryl, Imodium, Mucinex pills  He received magnesium 2 g IV and calcium gluconate 2 g IV in the ER  ER attending discussed case with toxicology  EKG showed prolonged QT interval in the ED  Continue one-to-one observation  Monitor electrolytes replace as needed  Repeat EKG showed QT interval less than 500  The patient is medically stable to discharge to inpatient psychiatric facility  Psych consultation appreciated, the patient signed a 201 form but meets criteria for 302 if he changes his mind  Crisis to start bed search

## 2019-11-25 NOTE — SOCIAL WORK
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, he stated he knows what he needs to do, pt denies any alcohol, pt has a rx plan [address], pt has had no hhc in the past, no dme equipment  Pt has psych consult pending , will reassess d/c plan, ,   Patient/caregiver received discharge checklist   Content reviewed  Patient/caregiver encouraged to participate in discharge plan of care prior to discharge home  CM reviewed d/c planning process including the following: identifying help at home, patient preference for d/c planning needs, availability of treatment team to discuss questions or concerns patient and/or family may have regarding understanding medications and recognizing signs and symptoms once discharged  CM also encouraged patient to follow up with all recommended appointments after discharge  Patient advised of importance for patient and family to participate in managing patients medical well being

## 2019-11-26 PROBLEM — F39 MOOD DISORDER (HCC): Status: ACTIVE | Noted: 2019-11-26

## 2019-11-26 PROBLEM — Z00.8 MEDICAL CLEARANCE FOR PSYCHIATRIC ADMISSION: Status: ACTIVE | Noted: 2019-11-26

## 2019-11-26 PROBLEM — F19.10 SUBSTANCE ABUSE (HCC): Status: ACTIVE | Noted: 2019-11-26

## 2019-11-26 LAB
ALBUMIN SERPL BCP-MCNC: 3.6 G/DL (ref 3.5–5.7)
ALP SERPL-CCNC: 42 U/L (ref 40–150)
ALT SERPL W P-5'-P-CCNC: 7 U/L (ref 7–52)
ANION GAP SERPL CALCULATED.3IONS-SCNC: 6 MMOL/L (ref 4–13)
AST SERPL W P-5'-P-CCNC: 8 U/L (ref 13–39)
BASOPHILS # BLD AUTO: 0.1 THOUSANDS/ΜL (ref 0–0.1)
BASOPHILS NFR BLD AUTO: 1 % (ref 0–2)
BILIRUB SERPL-MCNC: 0.6 MG/DL (ref 0.2–1)
BUN SERPL-MCNC: 14 MG/DL (ref 7–25)
CALCIUM SERPL-MCNC: 8.4 MG/DL (ref 8.6–10.5)
CHLORIDE SERPL-SCNC: 109 MMOL/L (ref 98–107)
CHOLEST SERPL-MCNC: 187 MG/DL (ref 0–200)
CO2 SERPL-SCNC: 24 MMOL/L (ref 21–31)
CREAT SERPL-MCNC: 0.77 MG/DL (ref 0.7–1.3)
EOSINOPHIL # BLD AUTO: 0.1 THOUSAND/ΜL (ref 0–0.61)
EOSINOPHIL NFR BLD AUTO: 1 % (ref 0–5)
ERYTHROCYTE [DISTWIDTH] IN BLOOD BY AUTOMATED COUNT: 13.6 % (ref 11.5–14.5)
GFR SERPL CREATININE-BSD FRML MDRD: 110 ML/MIN/1.73SQ M
GLUCOSE P FAST SERPL-MCNC: 98 MG/DL (ref 65–99)
GLUCOSE SERPL-MCNC: 98 MG/DL (ref 65–99)
HCT VFR BLD AUTO: 38.9 % (ref 42–47)
HDLC SERPL-MCNC: 35 MG/DL
HGB BLD-MCNC: 13.3 G/DL (ref 14–18)
LDLC SERPL CALC-MCNC: 124 MG/DL (ref 0–100)
LYMPHOCYTES # BLD AUTO: 1.4 THOUSANDS/ΜL (ref 0.6–4.47)
LYMPHOCYTES NFR BLD AUTO: 21 % (ref 21–51)
MCH RBC QN AUTO: 30.4 PG (ref 26–34)
MCHC RBC AUTO-ENTMCNC: 34.2 G/DL (ref 31–37)
MCV RBC AUTO: 89 FL (ref 81–99)
MONOCYTES # BLD AUTO: 0.3 THOUSAND/ΜL (ref 0.17–1.22)
MONOCYTES NFR BLD AUTO: 4 % (ref 2–12)
NEUTROPHILS # BLD AUTO: 4.7 THOUSANDS/ΜL (ref 1.4–6.5)
NEUTS SEG NFR BLD AUTO: 72 % (ref 42–75)
NONHDLC SERPL-MCNC: 152 MG/DL
PLATELET # BLD AUTO: 240 THOUSANDS/UL (ref 149–390)
PMV BLD AUTO: 7.1 FL (ref 8.6–11.7)
POTASSIUM SERPL-SCNC: 3.8 MMOL/L (ref 3.5–5.5)
PROT SERPL-MCNC: 6.2 G/DL (ref 6.4–8.9)
RBC # BLD AUTO: 4.38 MILLION/UL (ref 4.3–5.9)
RPR SER QL: NORMAL
SODIUM SERPL-SCNC: 139 MMOL/L (ref 134–143)
TRIGL SERPL-MCNC: 142 MG/DL (ref 44–166)
TSH SERPL DL<=0.05 MIU/L-ACNC: 0.4 UIU/ML (ref 0.45–5.33)
WBC # BLD AUTO: 6.5 THOUSAND/UL (ref 4.8–10.8)

## 2019-11-26 PROCEDURE — 80053 COMPREHEN METABOLIC PANEL: CPT | Performed by: NURSE PRACTITIONER

## 2019-11-26 PROCEDURE — 85025 COMPLETE CBC W/AUTO DIFF WBC: CPT | Performed by: NURSE PRACTITIONER

## 2019-11-26 PROCEDURE — 99252 IP/OBS CONSLTJ NEW/EST SF 35: CPT | Performed by: PHYSICIAN ASSISTANT

## 2019-11-26 PROCEDURE — 84443 ASSAY THYROID STIM HORMONE: CPT | Performed by: NURSE PRACTITIONER

## 2019-11-26 PROCEDURE — 86592 SYPHILIS TEST NON-TREP QUAL: CPT | Performed by: NURSE PRACTITIONER

## 2019-11-26 PROCEDURE — 80061 LIPID PANEL: CPT | Performed by: NURSE PRACTITIONER

## 2019-11-26 RX ORDER — LISINOPRIL 10 MG/1
10 TABLET ORAL DAILY
Status: DISCONTINUED | OUTPATIENT
Start: 2019-11-26 | End: 2019-11-27 | Stop reason: HOSPADM

## 2019-11-26 RX ORDER — ONDANSETRON 4 MG/1
4 TABLET, ORALLY DISINTEGRATING ORAL EVERY 6 HOURS PRN
Status: DISCONTINUED | OUTPATIENT
Start: 2019-11-26 | End: 2019-11-27 | Stop reason: HOSPADM

## 2019-11-26 RX ORDER — LAMOTRIGINE 25 MG/1
25 TABLET ORAL DAILY
Status: DISCONTINUED | OUTPATIENT
Start: 2019-11-26 | End: 2019-11-27 | Stop reason: HOSPADM

## 2019-11-26 RX ADMIN — LISINOPRIL 10 MG: 10 TABLET ORAL at 08:51

## 2019-11-26 RX ADMIN — NICOTINE 1 PATCH: 14 PATCH, EXTENDED RELEASE TRANSDERMAL at 08:52

## 2019-11-26 RX ADMIN — LAMOTRIGINE 25 MG: 25 TABLET ORAL at 08:51

## 2019-11-26 RX ADMIN — BISOPROLOL FUMARATE 5 MG: 5 TABLET ORAL at 08:52

## 2019-11-26 NOTE — PROGRESS NOTES
Daily Rounds Documentation    Team Members Present:   Dr King Ríos, MD Zainab Duncan, RN  Shanna Nickerson, LSW   Rosangela Martinez, LCSW    Denies doing street drugs  Meth and PCP and opiates positive  Needs IP rehab but does not want to go  Denied SA  Friend get benzos for him  Used to teach grade school students  DC Friday

## 2019-11-26 NOTE — PROGRESS NOTES
Patient appears to be sleeping well thus far, no noted distress  Remains on 7" checks for safety and behaviors

## 2019-11-26 NOTE — PROGRESS NOTES
Patient arrived on the unit with only one small bag of belongings that include the following:    Patient room: shorts x1, pants x1, & underwear x1      Contraband closet: sneakers x1    NO WALLET OR CASH    NO MEDICATION

## 2019-11-26 NOTE — NURSING NOTE
Patient discharged to [facility] by stretcher in stable condition   Patient picked up and accompanied by [place]

## 2019-11-26 NOTE — PROGRESS NOTES
Pt was present in the milieu for meals, quiet, flat, poor eye contact  Pt compliant with medications and meals  Pt reclusive in room during group  Denied pain, anxiety, depression  Pt reported no AH, VH, SI, HI  Will continue to monitor

## 2019-11-26 NOTE — TREATMENT PLAN
TREATMENT PLAN REVIEW - [address] 38 y o  1975 male MRN: [id number]    [facility] [address] Room / Bed: [place] [address]/[facility] 974-69 Encounter: [id number]          Admit Date/Time:  11/25/2019  9:15 PM    Treatment Team: Attending Provider: Donaldo Carr MD; Consulting Physician: Shawn Jurado MD; Registered Nurse: Saadia Retana, BARBARA; Patient Care Assistant: Gibran Castillo;  Patient Care Technician: Andrew Leavitt; Patient Care Assistant: Joanna Sanders; Patient Care Assistant: Harini Briggs; Registered Nurse: Donis Thomas, RN; Registered Nurse: Genny Stacy RN; Recreational Therapist: Rose Marie Rodriguez; Care Manager: Donaldo Abbott RN    Diagnosis: Active Problems:    Tobacco abuse    Essential hypertension    Overdose of medication    Mood disorder [facility])    Substance abuse [facility])      Patient Strengths/Assets: average or above intelligence, capable of independent living, communication skills, family ties    Patient Barriers/Limitations: financial instability, lack of stable employment, marital/family conflict, poor insight    Short Term Goals: decrease in anxiety symptoms, improvement in insight, mood stabilization    Long Term Goals: stabilization of mood, improved insight, acceptance of need for psychiatric follow up after discharge    Progress Towards Goals: starting psychiatric medications as prescribed    Recommended Treatment: medication management, patient medication education, group therapy, milieu therapy, continued Behavioral Health psychiatric evaluation/assessment process, D/A rehab    Treatment Frequency: daily medication monitoring, group and milieu therapy daily, monitoring through interdisciplinary rounds, monitoring through weekly patient care conferences    Expected Discharge Date:  12/1/19      Discharge Plan: placement in inpatient drug and alcohol rehabilitation program, referral for outpatient medication management with a psychiatrist, referral for outpatient psychotherapy    Treatment Plan Created/Updated By: Susanne Gonzalez MD

## 2019-11-26 NOTE — PLAN OF CARE
Problem: Depression  Goal: Treatment Goal: Demonstrate behavioral control of depressive symptoms, verbalize feelings of improved mood/affect, and adopt new coping skills prior to discharge  Outcome: Progressing  Goal: Verbalize thoughts and feelings  Description  Interventions:  - Assess and re-assess patient's level of risk   - Engage patient in 1:1 interactions, daily, for a minimum of 15 minutes   - Encourage patient to express feelings, fears, frustrations, hopes   Outcome: Progressing  Goal: Refrain from harming self  Description  Interventions:  - Monitor patient closely, per order   - Supervise medication ingestion, monitor effects and side effects   Outcome: Progressing  Goal: Refrain from isolation  Description  Interventions:  - Develop a trusting relationship   - Encourage socialization   Outcome: Progressing  Goal: Refrain from self-neglect  Outcome: Progressing  Goal: Attend and participate in unit activities, including therapeutic, recreational, and educational groups  Description  Interventions:  - Provide therapeutic and educational activities daily, encourage attendance and participation, and document same in the medical record   Outcome: Progressing  Goal: Complete daily ADLs, including personal hygiene independently, as able  Description  Interventions:  - Observe, teach, and assist patient with ADLS  -  Monitor and promote a balance of rest/activity, with adequate nutrition and elimination   Outcome: Progressing     Problem: ANXIETY  Goal: Will report anxiety at manageable levels  Description  INTERVENTIONS:  - Administer medication as ordered  - Teach and encourage coping skills  - Provide emotional support  - Assess patient/family for anxiety and ability to cope  Outcome: Progressing  Goal: By discharge: Patient will verbalize 2 strategies to deal with anxiety  Description  Interventions:  - Identify any obvious source/trigger to anxiety  - Staff will assist patient in applying identified coping technique/skills  - Encourage attendance of scheduled groups and activities  Outcome: Progressing     Problem: SUBSTANCE USE/ABUSE  Goal: Will have no detox symptoms and will verbalize plan for changing substance-related behavior  Description  INTERVENTIONS:  - Monitor physical status and assess for symptoms of withdrawal  - Administer medication as ordered  - Provide emotional support with 1 on 1 interaction with staff  - Encourage recovery focused program/ addiction education  - Assess for verbalization of changing behaviors related to substance abuse  - Initiate consults and referrals as appropriate (Case Management, Spiritual Care, etc )  Outcome: Progressing  Goal: By discharge, will develop insight into their chemical dependency and sustain motivation to continue in recovery  Description  INTERVENTIONS:  - Attends all daily group sessions and scheduled AA groups  - Actively practices coping skills through participation in the therapeutic community and adherence to program rules  - Reviews and completes assignments from individual treatment plan  - Assist patient development of understanding of their personal cycle of addiction and relapse triggers  Outcome: Progressing  Goal: By discharge, patient will have ongoing treatment plan addressing chemical dependency  Description  INTERVENTIONS:  - Assist patient with resources and/or appointments for ongoing recovery based living  Outcome: Progressing     Problem: Ineffective Coping  Goal: Participates in unit activities  Description  Interventions:  - Provide therapeutic environment   - Provide required programming   - Redirect inappropriate behaviors   Outcome: Progressing

## 2019-11-26 NOTE — H&P
Psychiatric Evaluation - Behavioral Health     Identification Leonor Sifuentes 38 y o  male MRN: [id number]  Unit/Bed#: [facility] 973-08 Encounter: [id number]    Chief Complaint: "I took some pills"    History of Present Illness     Ching Foley is a 38 y o  male with a history of Bipolar Disorder who was admitted to the inpatient adult psychiatric unit on a voluntary 201 commitment basis due to unstable mood, substance abuse, inability to care for self and overdose of OTC medication  On evaluation in the inpatient psychiatric unit Leta Ritchieair to taking an overdose of Mucinex DM and Advil p m  He reports he has been abusing over-the-counter medications for the last several years  In review of his chart patient was admitted to the inpatient psychiatric unit due to questionable intent to harm himself with this overdose  Patient states he uses over-the-counter medications recreationally  However it is documented that he had expressed to his wife feelings of hopelessness and lack of self-worth with no reason to live a few days prior to his overdose  Patient denies feeling depressed  However he is teary eyed and visibly upset when his issues of addiction and resulting losses in his life are discussed  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, he has marital conflict with his wife due to his addiction problems and his son has been living with his parents as a result  He is teary eyed and admits he feels guilty for these issues  He does have decreased interest in activities  He denies overt anxiety but does report being worried about his finances  He has lack of energy and motivation  He however denies feeling hopeless or helpless  He denies any suicidal or homicidal ideations    He denies any symptoms consistent with darby however was treated in the past with Lamictal and Remeron after an inpatient hospitalization several years ago, indicating diagnosis of bipolar disorder in the past   There is no evidence of psychosis or agitation  Psychiatric Review Of Systems:    Sleep changes: yes  Appetite changes:no  Weight changes: no  Energy: decreased  Interest/pleasure/: decreased  Anhedonia: yes  Anxiety: worrying  Sarah: no  Guilt:  yes  Hopeless:  no  Self injurious behavior/risky behavior: yes  Suicidal ideation: no  Homicidal ideation: no  Auditory hallucinations: no  Visual hallucinations: no  Delusional thinking: no  Eating disorder history: no  Obsessive/compulsive symptoms: no    Historical Information     Past Psychiatric History:     Past Inpatient Psychiatric Treatment:   One past inpatient psychiatric admission at hospital in [facility]  Past Outpatient Psychiatric Treatment:    Was in outpatient psychiatric treatment in the past with a psychiatrist  Past Suicide Attempts: no  Past Violent Behavior: none  Past Psychiatric Medication Trials: Remeron and Lamictal     Substance Abuse History:    Social History     Tobacco History     Smoking Status  Current Every Day Smoker Smoking Frequency  1 pack/day for 25 years (25 pk yrs)    Smokeless Tobacco Use  Never Used          Alcohol History     Alcohol Use Status  Never          Drug Use     Drug Use Status  Yes Types  Benzodiazepines, Oxycodone Comment  pt admits to use of oxycodone and xanax            Sexual Activity     Sexually Active  Yes Partners  Female          Activities of Daily Living    Not Asked                 I have assessed this patient for substance use within the past 12 months    Alcohol use: denies use  Recreational drug use: OTC medication- DM meds, benadryl, imodium    Family Psychiatric History:     Social History:    Education: college graduate  Marital History:   Children: 1 child  Living Arrangement: lives in home with with wife at mother in laws   Occupational History: unemployed  Functioning Relationships: good support system  Legal History: none   History: None    Traumatic History:   Denied    Past Medical History:      Past Medical History:   Diagnosis Date    Biceps tendon tear     Carpal tunnel syndrome     Chronic pain     back    Hypertension      Past Surgical History:   Procedure Laterality Date    CARPAL TUNNEL RELEASE      ROTATOR CUFF REPAIR      ULNAR NERVE REPAIR         Medical Review Of Systems:    Pertinent items are noted in HPI  Allergies:    No Known Allergies    Medications: All current active medications have been reviewed  OBJECTIVE:    Vital signs in last 24 hours:    Temp:  [98 °F (36 7 °C)-99 3 °F (37 4 °C)] 99 3 °F (37 4 °C)  HR:  [66-80] 80  Resp:  [18] 18  BP: (130-150)/(82-93) 133/85    No intake or output data in the 24 hours ending 11/26/19 0901     Mental Status Evaluation:    Appearance:  disheveled, dressed in hospital attire, poor hygiene, wearing pajamas   Behavior:  cooperative, guarded   Speech:  normal rate, normal volume   Mood:  superficailly euthymic   Affect:  tearful, overbright, reactive   Language: naming objects   Thought Process:  organized   Associations: intact associations   Thought Content:  no overt delusions   Perceptual Disturbances: none   Risk Potential: Suicidal ideation - None  Homicidal ideation - None  Potential for aggression - No   Sensorium:  oriented to person, place and time/date   Memory:  recent and remote memory grossly intact   Consciousness:  alert and awake   Attention: attention span and concentration are age appropriate   Intellect: within normal limits   Fund of Knowledge: awareness of current events: yes   Insight:  limited   Judgment: limited   Muscle Strength Muscle Tone: normal  normal   Gait/Station: normal gait/station   Motor Activity: no abnormal movements       Laboratory Results:   I have personally reviewed all pertinent laboratory/tests results  Imaging Studies:   Xr Chest 1 View Portable    Result Date: 11/24/2019  Narrative: CHEST INDICATION:   od ams   COMPARISON:  December 5, 2017 EXAM PERFORMED/VIEWS: XR CHEST PORTABLE 1 FINDINGS: Cardiomediastinal silhouette appears unremarkable  The lungs are clear  No pneumothorax or pleural effusion  Osseous structures appear within normal limits for patient age  Impression: No acute cardiopulmonary disease  Workstation performed: [id number]     Ct Head Without Contrast    Result Date: 11/24/2019  Narrative: CT BRAIN - WITHOUT CONTRAST INDICATION:   Altered mental status  COMPARISON:  CT 12/5/2017, MR 12/6/2017 TECHNIQUE:  CT examination of the brain was performed  In addition to axial images, coronal 2D reformatted images were created and submitted for interpretation  Radiation dose length product (DLP) for this visit:  818 mGy-cm   This examination, like all CT scans performed in the [facility], was performed utilizing techniques to minimize radiation dose exposure, including the use of iterative reconstruction and automated exposure control  IMAGE QUALITY:  Diagnostic  FINDINGS: PARENCHYMA:  No intracranial mass, mass effect or midline shift  No CT signs of acute infarction  No acute parenchymal hemorrhage  VENTRICLES AND EXTRA-AXIAL SPACES:  Normal for the patient's age  VISUALIZED ORBITS AND PARANASAL SINUSES:  Unremarkable  CALVARIUM AND EXTRACRANIAL SOFT TISSUES:  Normal   Borderline tonsillar ectopia  Impression: No acute intracranial abnormality  Workstation performed: [id number]       Code Status: Level 1 - Full Code  Advance Directive and Living Will: <no information>    Assessment/Plan   Active Problems:      Overdose of medication    Mood disorder (HCC)    Substance abuse ([facility] )    Tobacco abuse    Essential hypertension    Patient Strengths: average or above intelligence, capable of independent living, communication skills     Patient Barriers: financial instability, lack of stable employment, marital conflict, poor insight    Treatment Plan:     Planned Treatment and Medication Changes:  Initiate Lamictal 25 mg daily    We will adjust medication slowly due to risk of rash  Patient is made aware of this  Recommendation for counseling for substance abuse upon discharge and most optimal discharge plan would be to an inpatient rehab  All current active medications have been reviewed  Encourage group therapy, milieu therapy and occupational therapy  Behavioral Health checks every 7 minutes      Risks / Benefits of Treatment:    Risks, benefits, and possible side effects of medications explained to patient and patient verbalizes understanding and agreement for treatment  Counseling / Coordination of Care:    Patient's presentation on admission and proposed treatment plan discussed with treatment team   Diagnosis, medication changes and treatment plan reviewed with patient  Stressors preceding admission discussed with patient including drug use problems and financial problems  Events leading to admission reviewed with patient      Inpatient Psychiatric Certification:    Estimated length of stay: 7 midnights      Cheryl Torres MD 11/26/19

## 2019-11-26 NOTE — PROGRESS NOTES
Patient is a 201 [facility] for what is believed to be an intentional OD on Mucinex, Benadryl and Immodium  Patient arrived via stretcher  Walked on unit  Pleasant and cooperative with admission process  Patient changed into paper scrubs  Patient reports having chronic back pain from a martial arts history and was prescribed oxycodone "8-9 years ago " Patient says about "3 years ago" he had begun taking the oxycodone more than prescribed along with Xanax  Current prescriptions will need to be verified with [facility] in [place], [place]  Patient unclear about which prescriptions he currently has active and denies taking home medications  Patient guarded when discussing his home stressors  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 and that 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 year  Patient says him and his wife had been having some "difficulties" and have recently gotten back together after having  sometime last year  Patient says they have a 15year old son who lives with relatives right now  Patient denies current or past SI/SA  Reports the Benadryl, Immodium and Mucinex he had taken were an attempt to counteract the withdrawal he was going through  Patient did reported he was going to go through withdrawal and that he did not tell the previous hospital this  Discussed with Dr Kwame Lord patient's 1:1 status at previous hospital along with his presentation and patient's concern over withdrawal and UDS results  Dr Chichi Joyce added PRN medications for withdrawal and felt Q 7 minute checks were sufficient for patient safety and that a 1:1 observation was no longer needed  Admission was concluded and patient was shown to room and PRN Clonidine, Bentyl, Robaxin and Atarax were administered

## 2019-11-26 NOTE — CONSULTS
Consult- Josie Yu 1975, 38 y o  male MRN: [id number]    Unit/Bed#: [facility] 014-38 Encounter: [id number]    Primary Care Provider: Garry Rebollar DO   Date and time admitted to hospital: 11/25/2019  9:15 PM      Inpatient consult for Medical Clearance for [facility] patient  Consult performed by: Dalia Mcburney, PA-C  Consult ordered by: SCOTT Matta          * Medical clearance for psychiatric admission  Assessment & Plan  Based on review of patient's labs, vital signs as well as physical exam is medically cleared for inpatient psychiatric admission    Essential hypertension  Assessment & Plan  Patient is well controlled at this time, will continue pre-hospital lisinopril 10 mg p o  Daily and Zebeta 5 mg p o  Daily  Additionally will give Catapres 0 1 mg p o  Q 6 hours p r n  Opiate withdrawal ([address] )  Assessment & Plan  Will give Catapres 0 1 mg p o  Q 6 hours p r n  And Zofran 4 mg p r n  Tobacco abuse  Assessment & Plan  Will give nicotine 14 mg transdermal daily        Recommendations for Discharge:  · Per Primary Service    Counseling / Coordination of Care Time: 30 minutes  Greater than 50% of total time spent on patient counseling and coordination of care  History of Present Illness:  Josie Yu is a 38 y o  male who is originally admitted to the inpatient psychiatric service due to intentional polysubstance overdose  We are consulted for medical clearance for inpatient psychiatric admission  Patient is resting comfortably in bed at time of exam, he is easily arousable and is cooperative with history as well as physical exam   He offers no medical complaints at this time but states that he does take 2 different blood pressure medications as well as previously has been on chronic narcotics for chronic low back pain  Additionally patient reports that he smokes a pack of cigarettes per day  Review of Systems:  Review of Systems   Constitutional: Negative for chills and fever  Respiratory: Negative for cough, shortness of breath and wheezing  Cardiovascular: Negative for chest pain and palpitations  Gastrointestinal: Negative for abdominal pain, nausea and vomiting  Genitourinary: Negative for dysuria, frequency, hematuria and urgency  Musculoskeletal: Positive for back pain  Neurological: Negative for dizziness, syncope and headaches  Psychiatric/Behavioral: Positive for dysphoric mood  All other systems reviewed and are negative  Past Medical and Surgical History:   Past Medical History:   Diagnosis Date    Biceps tendon tear     Carpal tunnel syndrome     Chronic pain     back    Hypertension        Past Surgical History:   Procedure Laterality Date    CARPAL TUNNEL RELEASE      ROTATOR CUFF REPAIR      ULNAR NERVE REPAIR         Meds/Allergies:  all medications and allergies reviewed    Allergies: No Known Allergies    Social History:     Marital Status: /Civil Union    Substance Use History:   Social History     Substance and Sexual Activity   Alcohol Use Never    Alcohol/week: 0 0 standard drinks    Frequency: Never    Binge frequency: Never     Social History     Tobacco Use   Smoking Status Current Every Day Smoker    Packs/day: 1 00    Years: 25 00    Pack years: 25 00   Smokeless Tobacco Never Used     Social History     Substance and Sexual Activity   Drug Use Yes    Types: Oxycodone, Benzodiazepines    Comment: pt admits to use of oxycodone and xanax  Family History:  I have reviewed the patients family history    Physical Exam:   Vitals:   Blood Pressure: 130/82 (11/25/19 2251)  Pulse: 73 (11/25/19 2124)  Temperature: 99 1 °F (37 3 °C) (11/25/19 2124)  Temp Source: Temporal (11/25/19 2124)  Respirations: 18 (11/25/19 2124)  Height: 5' 11" (180 3 cm) (11/25/19 2124)  Weight - Scale: 87 5 kg (193 lb) (11/25/19 2124)  SpO2: 96 % (11/25/19 2124)    Physical Exam   Constitutional: He is oriented to person, place, and time   He appears well-developed and well-nourished  HENT:   Head: Normocephalic and atraumatic  Mouth/Throat: No oropharyngeal exudate  Eyes: Pupils are equal, round, and reactive to light  EOM are normal  No scleral icterus  Neck: Normal range of motion  Neck supple  No JVD present  Cardiovascular: Normal rate, regular rhythm and normal heart sounds  No murmur heard  Pulmonary/Chest: Effort normal and breath sounds normal  No respiratory distress  He has no wheezes  He has no rales  Abdominal: Soft  Bowel sounds are normal  There is no tenderness  There is no rebound and no guarding  Musculoskeletal: Normal range of motion  He exhibits no edema  Lymphadenopathy:     He has no cervical adenopathy  Neurological: He is alert and oriented to person, place, and time  No cranial nerve deficit  Skin: Skin is warm and dry  No rash noted  No erythema  Psychiatric: His speech is normal and behavior is normal  Thought content normal  Cognition and memory are normal  He exhibits a depressed mood  Nursing note and vitals reviewed  Additional Data:   Lab Results: I have personally reviewed pertinent reports  Results from last 7 days   Lab Units 11/26/19  0505   WBC Thousand/uL 6 50   HEMOGLOBIN g/dL 13 3*   HEMATOCRIT % 38 9*   PLATELETS Thousands/uL 240   NEUTROS PCT % 72   LYMPHS PCT % 21   MONOS PCT % 4   EOS PCT % 1     Results from last 7 days   Lab Units 11/26/19  0505   POTASSIUM mmol/L 3 8   CHLORIDE mmol/L 109*   CO2 mmol/L 24   BUN mg/dL 14   CREATININE mg/dL 0 77   CALCIUM mg/dL 8 4*   ALK PHOS U/L 42   ALT U/L 7   AST U/L 8*     Results from last 7 days   Lab Units 11/24/19  1348   INR  1 07     Results from last 7 days   Lab Units 11/24/19  1348   TROPONIN I ng/mL <0 02     Lab Results   Component Value Date/Time    HGBA1C 5 5 12/06/2017 05:01 AM     Results from last 7 days   Lab Units 11/24/19  1412   POC GLUCOSE mg/dl 74       Imaging: I have personally reviewed pertinent reports  No orders to display       EKG, Pathology, and Other Studies Reviewed on Admission:   · EKG:  Normal sinus rhythm at a rate of 67 with nonspecific T-wave abnormalities    ** Please Note: This note has been constructed using a voice recognition system   **

## 2019-11-26 NOTE — PLAN OF CARE
Problem: Depression  Goal: Treatment Goal: Demonstrate behavioral control of depressive symptoms, verbalize feelings of improved mood/affect, and adopt new coping skills prior to discharge  Outcome: Progressing  Goal: Verbalize thoughts and feelings  Description  Interventions:  - Assess and re-assess patient's level of risk   - Engage patient in 1:1 interactions, daily, for a minimum of 15 minutes   - Encourage patient to express feelings, fears, frustrations, hopes   Outcome: Progressing  Goal: Refrain from harming self  Description  Interventions:  - Monitor patient closely, per order   - Supervise medication ingestion, monitor effects and side effects   Outcome: Progressing  Goal: Refrain from isolation  Description  Interventions:  - Develop a trusting relationship   - Encourage socialization   Outcome: Progressing  Goal: Refrain from self-neglect  Outcome: Progressing  Goal: Attend and participate in unit activities, including therapeutic, recreational, and educational groups  Description  Interventions:  - Provide therapeutic and educational activities daily, encourage attendance and participation, and document same in the medical record   Outcome: Progressing  Goal: Complete daily ADLs, including personal hygiene independently, as able  Description  Interventions:  - Observe, teach, and assist patient with ADLS  -  Monitor and promote a balance of rest/activity, with adequate nutrition and elimination   Outcome: Progressing     Problem: ANXIETY  Goal: Will report anxiety at manageable levels  Description  INTERVENTIONS:  - Administer medication as ordered  - Teach and encourage coping skills  - Provide emotional support  - Assess patient/family for anxiety and ability to cope  Outcome: Progressing  Goal: By discharge: Patient will verbalize 2 strategies to deal with anxiety  Description  Interventions:  - Identify any obvious source/trigger to anxiety  - Staff will assist patient in applying identified coping technique/skills  - Encourage attendance of scheduled groups and activities  Outcome: Progressing   Care Plan initiated on admission  Will continue to monitor and adjust as needed  Problem: SUBSTANCE USE/ABUSE  Goal: Will have no detox symptoms and will verbalize plan for changing substance-related behavior  Description  INTERVENTIONS:  - Monitor physical status and assess for symptoms of withdrawal  - Administer medication as ordered  - Provide emotional support with 1 on 1 interaction with staff  - Encourage recovery focused program/ addiction education  - Assess for verbalization of changing behaviors related to substance abuse  - Initiate consults and referrals as appropriate (Case Management, Spiritual Care, etc )  Outcome: Not Progressing  Goal: By discharge, will develop insight into their chemical dependency and sustain motivation to continue in recovery  Description  INTERVENTIONS:  - Attends all daily group sessions and scheduled AA groups  - Actively practices coping skills through participation in the therapeutic community and adherence to program rules  - Reviews and completes assignments from individual treatment plan  - Assist patient development of understanding of their personal cycle of addiction and relapse triggers  Outcome: Not Progressing  Goal: By discharge, patient will have ongoing treatment plan addressing chemical dependency  Description  INTERVENTIONS:  - Assist patient with resources and/or appointments for ongoing recovery based living  Outcome: Not Progressing   Patient experiencing withdrawal symptoms and requiring PRN medications  Patient does not feel he needs rehab, but is open to resources for NA meetings

## 2019-11-26 NOTE — ASSESSMENT & PLAN NOTE
Based on review of patient's labs, vital signs as well as physical exam is medically cleared for inpatient psychiatric admission

## 2019-11-26 NOTE — ASSESSMENT & PLAN NOTE
Patient is well controlled at this time, will continue pre-hospital lisinopril 10 mg p o  Daily and Zebeta 5 mg p o  Daily  Additionally will give Catapres 0 1 mg p o  Q 6 hours p r n

## 2019-11-27 VITALS
OXYGEN SATURATION: 99 % | SYSTOLIC BLOOD PRESSURE: 121 MMHG | HEART RATE: 83 BPM | BODY MASS INDEX: 27.02 KG/M2 | HEIGHT: 71 IN | WEIGHT: 193 LBS | DIASTOLIC BLOOD PRESSURE: 73 MMHG | RESPIRATION RATE: 16 BRPM | TEMPERATURE: 98.8 F

## 2019-11-27 PROBLEM — F19.10 SUBSTANCE ABUSE, CONTINUOUS (HCC): Status: ACTIVE | Noted: 2019-11-27

## 2019-11-27 PROCEDURE — 99238 HOSP IP/OBS DSCHRG MGMT 30/<: CPT | Performed by: NURSE PRACTITIONER

## 2019-11-27 RX ORDER — LAMOTRIGINE 25 MG/1
25 TABLET ORAL DAILY
Qty: 30 TABLET | Refills: 0 | Status: SHIPPED | OUTPATIENT
Start: 2019-11-28 | End: 2021-01-16

## 2019-11-27 RX ADMIN — LAMOTRIGINE 25 MG: 25 TABLET ORAL at 09:01

## 2019-11-27 RX ADMIN — LISINOPRIL 10 MG: 10 TABLET ORAL at 09:01

## 2019-11-27 RX ADMIN — NICOTINE 1 PATCH: 14 PATCH, EXTENDED RELEASE TRANSDERMAL at 09:01

## 2019-11-27 RX ADMIN — BISOPROLOL FUMARATE 5 MG: 5 TABLET ORAL at 09:01

## 2019-11-27 NOTE — PROGRESS NOTES
MHT went through patient belongings with patient for discharge from unit today  The following items are present and accounted for:   Shorts x 1    Pants x 1   Sneaker x 1 pair   Patient had NO wallet, No cards, No cash, No medication, No cell phone

## 2019-11-27 NOTE — DISCHARGE INSTRUCTIONS
Cigarette Smoking and Your Health, Ambulatory Care   American Heart Association: How Cigarettes Damage Your Body  American Heart Association  Kb, [facility] [facility]  Available from URL: Tomasa estevez za  As accessed 2013-10-02  Neo NELSON: Exposure to tobacco smoke causes immediate damage: a report of the Surgeon [address]; 126(2):158-159  Carmen R & Danielito WS: Perioperative smoking risk  Anesthesiology 2011; 114(4):734-736  [address]: Harms of Smoking and Health Benefits of Quitting  [address]  [place], [place]  2011  Available from URL: Pantea ee  As accessed 2013-10-02  Brian RE, Eunice PM, & [place] SA: Health effects of light and intermittent smoking: a review  Circulation 2010; 121(13):2726-9353  Debi Gamez NM: Tobacco smoke exposure and chronic conditions of childhood  Pediatrics 2010; 126(1):s406-l338  © 2014 [address] is for End User's use only and may not be sold, redistributed or otherwise used for commercial purposes  All illustrations and images included in CareNotes® are the copyrighted property of A D A M , Inc  or [place]  The above information is an  only  It is not intended as medical advice for individual conditions or treatments  Talk to your doctor, nurse or pharmacist before following any medical regimen to see if it is safe and effective for you  Cigarette Smoking and Your Health, Ambulatory Care   GENERAL INFORMATION:   What are the risks to my health if I smoke? Chemicals in tobacco are addictive and damage every cell in your body  All tobacco products are dangerous to you and to nonsmokers who breathe your secondhand smoke   Even if you are a light smoker or a social smoker, you have an increased risk for cancer, heart disease, and lung disease  If you are pregnant or have diabetes, smoking increases your risk for complications  What are the benefits to my health if I stop smoking? · Lower risk of cancer, heart disease, blood clots, heart attack, and stroke    · Lower risk of diabetes complications, such as kidney, artery, or eye disease, and nerve damage that can result in amputations    · Lower risk of lung infections and diseases, such as pneumonia, asthma, chronic bronchitis, and emphysema           · Increased benefits of chemotherapy if you already have cancer, and decreased risk for cancer returning after treatment    · Improved circulation, allowing more oxygen to be delivered to your body    · Improved ability to heal and to fight infections  What are the benefits to the health of others if I stop smoking? · Lower risk of lung cancer and heart disease in nonsmokers    · Lower risk of miscarriage, early delivery, low birth weight, and stillbirth    · Lower risk of SIDS, obesity, developmental delay, ear infections, colds, pneumonia, bronchitis, asthma, and ADHD in your child  Where can I find more information and support to stop smoking? It is never too late to stop smoking  Ask your healthcare provider for more information if you need help  · [employer]  Phone: 7- 907 - [phone]  Web Address: www Linkovery  Follow up with your healthcare provider as directed:  Write down your questions so you remember to ask them during your visits  CARE AGREEMENT:   You have the right to help plan your care  Learn about your health condition and how it may be treated  Discuss treatment options with your caregivers to decide what care you want to receive  You always have the right to refuse treatment  The above information is an  only  It is not intended as medical advice for individual conditions or treatments   Talk to your doctor, nurse or pharmacist before following any medical regimen to see if it is safe and effective for you  © 2014 [address] is for End User's use only and may not be sold, redistributed or otherwise used for commercial purposes  All illustrations and images included in CareNotes® are the copyrighted property of A D A M , Inc  or [place]  Adult Overdose   Substance Abuse and Mental Health Services [facility]): [facility] Opioid Overdose Prevention Toolkit  Substance Abuse and Mental Health Services [facility])  Lander Goldmann, MD  2014  Available from URL: http://store  [facility] gov/shin/content//DFL82-2851/Overdose_Toolkit pdf  As accessed 2015-03-10  Cody CH : Opioids  In: [employer], Jacqui RS, [facility] Emergency Medicine Concepts and Clinical Practice, 8th ed  [address] , [place], [place], 2014  Kim Pryor EW: Management of opioid analgesic overdose  N Engl J Med 2012; 367(2):146-155  Apurva Rivero G : Antidepressant Poisoning  In: Johana Peres , [employer], et al, eds  [address] Emergency Medicine Consult, 4th ed  [address] [facility] [place], [place], 2011  Carlos VIGIL & Andrés AV : Opiate Poisoning  In: Andres NELSON, Marin SR, et al, eds  [address] Emergency Medicine Consult, 4th ed  [address] [facility] [place], [place], 2011  [facility] Against [facility]cription Drug Abuse: Drug Overdose  [facility] Abuse  [place], [place]  2014  Available from URL: http://ncapda org/index  php?option=com_content&view=article&id=79:drug-overdose&catid=33:students&Itemid=7  As accessed 2014-10-15  National Safety [place]: Unintentional Overdoses  [address] [facility]  [address] [place] [address]  Available from URL: Penny is  aspx  As accessed 2014-10-15    Catherine Painting Cap, MA , et al: Principles of Managing the Acutely Poisoned or Overdosed Patient  In: Viviana Bell, Ozzy Capone, Nannette ALVARADO, et al, eds  Goldfrank's Toxicological Emergencies, 9th ed  [place], [place], 2011 © 2017 [address]  Information is for End User's use only and may not be sold, redistributed or otherwise used for commercial purposes  All illustrations and images included in CareNotes® are the copyrighted property of A D A M , Inc  or [place]  The above information is an  only  It is not intended as medical advice for individual conditions or treatments  Talk to your doctor, nurse or pharmacist before following any medical regimen to see if it is safe and effective for you  Chronic Hypertension, Ambulatory Care   GENERAL INFORMATION:   Chronic hypertension  is a long-term condition in which your blood pressure (BP) is higher than normal  Your BP is the force of your blood moving against the walls of your arteries  Hypertension is a BP of 140/90 or higher  Common symptoms include the following:   · Headache     · Blurred vision    · Chest pain     · Dizziness or weakness     · Trouble breathing     · Nosebleeds  Seek immediate care for the following symptoms:   · Severe headache or vision loss    · Weakness in an arm or leg    · Confusion or difficulty speaking    · Discomfort in your chest that feels like squeezing, pressure, fullness, or pain    · Suddenly feeling lightheaded or trouble breathing    · Pain or discomfort in your back, neck, jaw, stomach, or arm  Treatment for chronic hypertension  may include medicine to lower your BP  You may also need to make lifestyle changes  Take your medicine exactly as directed  Manage chronic hypertension:   · Take your BP at home  Sit and rest for 5 minutes before you take your BP  Extend your arm and support it on a flat surface  Your arm should be at the same level as your heart  Follow the directions that came with your BP monitor   If possible, take at least 2 BP readings each time  Take your BP at least twice a day at the same times each day, such as morning and evening  Keep a log of your BP readings and bring it to your follow-up visits  · Eat less sodium (salt)  Do not add sodium to your food  Limit foods that are high in sodium, such as canned foods, potato chips, and cold cuts  Your healthcare provider may suggest that you follow the [address] [place] [address] Street  The plan is low in sodium, unhealthy fats, and total fat  It is high in potassium, calcium, and fiber  · Exercise regularly  Exercise at least 30 minutes per day, on most days of the week  This will help decrease your BP  Ask your healthcare provider about the best exercise plan for you  · Limit alcohol  Women should limit alcohol to 1 drink a day  Men should limit alcohol to 2 drinks a day  A drink of alcohol is 12 ounces of beer, 5 ounces of wine, or 1½ ounces of liquor  · Do not smoke  If you smoke, it is never too late to quit  Smoking can increase your BP  Smoking also worsens other health conditions you may have that can increase your risk for hypertension  Ask your healthcare provider for information if you need help quitting  Follow up with your healthcare provider as directed: You will need to return to have your BP checked and to have other lab tests done  Write down your questions so you remember to ask them during your visits  CARE AGREEMENT:   You have the right to help plan your care  Learn about your health condition and how it may be treated  Discuss treatment options with your caregivers to decide what care you want to receive  You always have the right to refuse treatment  The above information is an  only  It is not intended as medical advice for individual conditions or treatments  Talk to your doctor, nurse or pharmacist before following any medical regimen to see if it is safe and effective for you    © 2014 [facility]  Information is for End User's use only and may not be sold, redistributed or otherwise used for commercial purposes  All illustrations and images included in CareNotes® are the copyrighted property of A D A M , Inc  or [place]  Adult Overdose   WHAT YOU NEED TO KNOW:   An overdose occurs when you take more medicine than is safe to take  An overdose may be mild, or it may be a life-threatening emergency  You may feel drowsy, dizzy, or nauseated, depending on what medicine you took  No specific harm was found to your body as a result of your overdose  Your symptoms have decreased over the last 6 to 12 hours  DISCHARGE INSTRUCTIONS:   Call 911 if you or someone close to you has any of the following symptoms:   · Your face is very pale and clammy to the touch  · Your body is limp or you are unable to speak  · You cannot be awakened  · Your breathing is slower or faster than usual      · Your heart is beating slower than usual     · You feel confused or more tired than usual, or you are sweating more than normal     · Your speech is slurred  · Your fingernails or lips are blue or purple  Return to the emergency department if:   · You have severe nausea and vomiting  · You cannot have a bowel movement or urinate  · Your skin and the whites of your eyes turn yellow  Contact your healthcare provider if:   · You think your medicine is not working  · You have nausea, vomiting, diarrhea, or abdominal cramps  · You have questions or concerns about your medicine  Take your medicine as directed:  Contact your healthcare provider if you think your medicine is not helping or if you have side effects  Do not take more medicine that is prescribed  Keep your medicines in the original containers  Keep a list of the medicines, vitamins, and herbs you take  Include the amounts, and when and why you take them  Do not share your medicine with others    Prevent another overdose:   · Read labels carefully  Read the labels of all the medicines that you take  Never take more than the label says to take  If you have questions, ask your pharmacist or healthcare provider  · Do not drink alcohol  Alcohol increases your risk for another overdose  Alcohol can also hide important symptoms that you need to call your healthcare provider for  · Do not drive or operate machinery  until your healthcare provider says it is okay  These activities may be dangerous after an overdose  · Use caution if you take more than one medicine at a time  Mixing medicines or taking more than one medicine at a time can be dangerous  · Tell your family or friends what medicines you are taking  Talk with them about what to do if you have an overdose  Follow up with your healthcare provider as directed: You may need to see a counselor or psychiatrist  Write down your questions so you remember to ask them during your visits  © 2017 [address] Information is for End User's use only and may not be sold, redistributed or otherwise used for commercial purposes  All illustrations and images included in CareNotes® are the copyrighted property of [employer] , Inc  or [place]  The above information is an  only  It is not intended as medical advice for individual conditions or treatments  Talk to your doctor, nurse or pharmacist before following any medical regimen to see if it is safe and effective for you

## 2019-11-27 NOTE — PROGRESS NOTES
11/27/19 0957   Team Meeting   Meeting Type Daily Rounds   Initial Conference Date 11/27/19   Patient/Family Present   Patient Present No   Patient's Family Present No     Daily Rounds Documentation    Team Members Present:   MD Bo Villela CRNP Linzie Longs, BARBARA Centeno, SANCHEZ Junior, [place]    Discharge Friday  Does not want rehab  No behavioral issues

## 2019-11-27 NOTE — SOCIAL WORK
SW called pt's wife Brain Beatty who stated her belief that pt is safe for discharge, having noted that he will be with many family members, regarding which he would appreciate  She expressed support for pt's receipt of OP rehab and [address] services regarding which SW will discuss with pt and make referrals, with his agreement  She is able to pickup pt tonight, if pt is determined ready for discharge  SW discussed pt's desire for discharge with Mele Cole who will prepare pt's Discharge Summary, in absence of Dr Quiana Jimenez

## 2019-11-27 NOTE — DISCHARGE INSTR - LAB
Contact Information: If you have any questions, concerns, pended studies, tests and/or procedures, or emergencies regarding your inpatient behavioral health visit  Please contact Africa Higgins" [facility] behavioral health unit [phone] and ask to speak to a , nurse or physician  A contact is available 24 hours/ 7 days a week at this number  Summary of Procedures Performed During your Stay:  Below is a list of major procedures performed during your hospital stay and a summary of results:  - No major procedures performed  Pending Studies (From admission, onward)    None        If studies are pending at discharge, follow up with your PCP and/or referring provider

## 2019-11-27 NOTE — DISCHARGE INSTR - APPOINTMENTS
The treatment recommends that you obtain ongoing drug rehab and mental health services  An appointment has been scheduled in your behalf for a drug / alcohol assessment on Monday, December 2, 2019 at 10:00 am, with Counselor Rahul Membreno at [employer] [facility] and [address], [facility] access [facility], [facility], [address] [place]; Phone:  [phone]; Fax:  [phone]  Please arrive early and bring your photo ID and insurance cards  Your summary of care will be faxed to this provider for continuity of care  An Intake appointment has been scheduled in your behalf for on Monday, December 9, 2019 at 1:15 pm, with Naz Walters at [facility] [address] , [place], [address]; Phone:  [phone]; Fax:  [phone]  Please arrive early and bring your photo ID and insurance cards  Your summary of care will be faxed to this provider for continuity of care  You declined a referral to obtain a new primary care physician as you stated that you will locate one on your own or request that a family member assist you  Please follow-up on your verbalized desire to obtain a primary care physician, as soon as conveniently possible

## 2019-11-27 NOTE — DISCHARGE SUMMARY
Discharge Summary - Gladis VANN 38 y o  male MRN: [id number]  Unit/Bed#: [facility] 232-46 Encounter: [id number]     Admission Date: 11/25/2019         Discharge Date: No discharge date for patient encounter  Attending Psychiatrist: Joe Aguirre MD    Reason for Admission/HPI:    Principal Problem:    Substance abuse, continuous ([facility] )  Active Problems:    Tobacco abuse    Essential hypertension    Overdose of medication    Mood disorder ([facility] )      Roberta Severs is a 69-year-old male patient admitted to the [address] [place] [address] unit on a voluntary 201 commitment basis due to anxiety and confusion status post overdose of over-the-counter medication  Per initial emergency room assessment completed by Dr Aziza Strauss:    "EMS providers report the patient was attempting to flag down help  He told them that he was afraid he was having a heart attack  He was afraid he would die  On their evaluation, he was very agitated, unable to give history except fact that he had taken a lot of Tylenol p m     They were able to give him a total of 5 mg of Versed which made him calm enough for them to get an EKG intravenous access "    Per initial nursing assessment completed by Brian Aviles RN:    "Patient states that he does not want to hurt himself or anyone else  Patient states that he took "a bunch of tylenol pm and mucinex because "he likes the way it makes him feel"  Patient states he has been going through relationship issues and it "numbs" the pain and makes him feel "nothing" and that is why he takes it  Patient denies any other illegal substances "    Per initial psychiatric evaluation completed by Dr Bahman Morse:    "On evaluation in the inpatient psychiatric unit Roberta Severs to taking an overdose of Mucinex DM and Advil p m  He reports he has been abusing over-the-counter medications for the last several years    In review of his chart patient was admitted to the inpatient psychiatric unit due to questionable intent to harm himself with this overdose  Patient states he uses over-the-counter medications recreationally  However it is documented that he had expressed to his wife feelings of hopelessness and lack of self-worth with no reason to live a few days prior to his overdose  Patient denies feeling depressed  However he is teary eyed and visibly upset when his issues of addiction and resulting losses in his life are discussed  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, he has marital conflict with his wife due to his addiction problems and his son has been living with his parents as a result  He is teary eyed and admits he feels guilty for these issues  He does have decreased interest in activities  He denies overt anxiety but does report being worried about his finances  He has lack of energy and motivation  He however denies feeling hopeless or helpless  He denies any suicidal or homicidal ideations  He denies any symptoms consistent with darby however was treated in the past with Lamictal and Remeron after an inpatient hospitalization several years ago, indicating diagnosis of bipolar disorder in the past   There is no evidence of psychosis or agitation "    Cora Santana has a past psychiatric history of Bipolar Disorder and substance abuse  He hospitalized on Behavioral Health Unit several years ago and has not been hospitalized since  He currently does not have an outpatient provider  Hospital Course:     Cora Santana was admitted to the [facility] unit after being medically cleared  Once on the unit, he was seen by medical doctor for physical examination  He was placed on 7 minutes behavioral health checks for patient safety  He was encouraged to attend both group and occupational therapy  Psychiatric medications were initiated and titrated to appropriate dosages  Before any medications were administered, risk versus benefit was discussed    Cora Santana agreed to take medications as prescribed and participate in psychiatric treatment  Once evaluated on the unit, he related that he was not overdosing on over-the-counter medications as a suicide attempt but takes them all the time to get high   His drug screen in the emergency room was positive for methamphetamines, benzodiazepines, and opiates  He denied any suicidal or homicidal ideation as well as any auditory or visual hallucinations  He confirmed and overwhelming problem with substance abuse  He declined inpatient rehabilitation but agreed to follow up with a psychiatrist as well as a drug and alcohol therapist on an outpatient basis  He requested to be released immediately  His wife was contacted for collateral information and confirmed his information as correct  Because he did not meet criteria for an involuntary commitment and was denying any suicidal or homicidal ideation as well as any symptoms of psychosis, the Psychiatric Care Team felt it would be appropriate to discharge him per his wishes  A follow-up appointment with [facility] was scheduled on his behalf for psychiatric medication management and therapy  A referral to [facility] was also completed on his behalf  On the day of discharge, Berta Pimentel denied any suicidal or homicidal ideation as well as any auditory or visual hallucinations  His mood and behavior were stable, and he was looking for to going home  Appearance:  casually dressed   Behavior:  normal   Speech:  normal pitch and normal volume   Mood:  normal   Affect:  normal   Thought Process:  normal   Thought Content:  normal   Perceptual Disturbances: None   Risk Potential: Patient denied any suicidal or homicidal ideations     Sensorium:  person, place, time/date and situation   Cognition:  grossly intact   Consciousness:  alert    Attention: attention span and concentration were age appropriate   Insight:  fair   Judgment: fair   Gait/Station: normal gait/station and normal balance   Motor Activity: no abnormal movements     Admission Diagnosis:Major depressive disorder, single episode, severe without psychotic features [F32 2]    Discharge Diagnosis:   Principal Problem:    Substance abuse, continuous ([place] [address] )  Active Problems:    Tobacco abuse    Essential hypertension    Overdose of medication    Mood disorder ([facility] )  Resolved Problems:    * No resolved hospital problems   *        Lab results:  Admission on 11/25/2019   Component Date Value    RPR 11/26/2019 Non-Reactive     TSH 3RD GENERATON 11/26/2019 0 400*    Ventricular Rate 11/25/2019 67     Atrial Rate 11/25/2019 67     NH Interval 11/25/2019 144     QRSD Interval 11/25/2019 100     QT Interval 11/25/2019 444     QTC Interval 11/25/2019 469     P Axis 11/25/2019 59     QRS Axis 11/25/2019 6     T Wave Axis 11/25/2019 45     WBC 11/26/2019 6 50     RBC 11/26/2019 4 38     Hemoglobin 11/26/2019 13 3*    Hematocrit 11/26/2019 38 9*    MCV 11/26/2019 89     MCH 11/26/2019 30 4     MCHC 11/26/2019 34 2     RDW 11/26/2019 13 6     MPV 11/26/2019 7 1*    Platelets 53/11/9782 240     Neutrophils Relative 11/26/2019 72     Lymphocytes Relative 11/26/2019 21     Monocytes Relative 11/26/2019 4     Eosinophils Relative 11/26/2019 1     Basophils Relative 11/26/2019 1     Neutrophils Absolute 11/26/2019 4 70     Lymphocytes Absolute 11/26/2019 1 40     Monocytes Absolute 11/26/2019 0 30     Eosinophils Absolute 11/26/2019 0 10     Basophils Absolute 11/26/2019 0 10     Sodium 11/26/2019 139     Potassium 11/26/2019 3 8     Chloride 11/26/2019 109*    CO2 11/26/2019 24     ANION GAP 11/26/2019 6     BUN 11/26/2019 14     Creatinine 11/26/2019 0 77     Glucose 11/26/2019 98     Glucose, Fasting 11/26/2019 98     Calcium 11/26/2019 8 4*    AST 11/26/2019 8*    ALT 11/26/2019 7     Alkaline Phosphatase 11/26/2019 42     Total Protein 11/26/2019 6 2*    Albumin 11/26/2019 3 6     Total Bilirubin 11/26/2019 0 60     eGFR 11/26/2019 110     Cholesterol 11/26/2019 187     Triglycerides 11/26/2019 142     HDL, Direct 11/26/2019 35*    LDL Calculated 11/26/2019 124*    Non-HDL-Chol (CHOL-HDL) 11/26/2019 152        Discharge Medications:  Current Discharge Medication List      START taking these medications    Details   lamoTRIgine (LaMICtal) 25 mg tablet Take 1 tablet (25 mg total) by mouth daily  Qty: 30 tablet, Refills: 0    Associated Diagnoses: Substance abuse ([address] )            Current Discharge Medication List      STOP taking these medications       nicotine (NICODERM CQ) 14 mg/24hr TD 24 hr patch Comments:   Reason for Stopping:         oxyCODONE (OxyCONTIN) 15 mg 12 hr tablet Comments:   Reason for Stopping:         ALPRAZolam (XANAX) 0 5 mg tablet Comments:   Reason for Stopping:         prochlorperazine (COMPAZINE) 5 mg tablet Comments:   Reason for Stopping:              Current Discharge Medication List         Current Discharge Medication List      CONTINUE these medications which have NOT CHANGED    Details   bisoprolol (ZEBETA) 5 mg tablet Take 5 mg by mouth daily      lisinopril (ZESTRIL) 10 mg tablet Take 10 mg by mouth daily      ergocalciferol (VITAMIN D2) 50,000 units Take 1 capsule by mouth once a week for 8 doses  Qty: 8 capsule, Refills: 0              Discharge instructions/Information to patient and family:   See after visit summary for information provided to patient and family  Provisions for Follow-Up Care:  See after visit summary for information related to follow-up care and any pertinent home health orders  Discharge Statement   I spent 30 minutes discharging the patient  This time was spent on the day of discharge  I had direct contact with the patient on the day of discharge  Additional documentation is required if more than 30 minutes were spent on discharge

## 2019-11-27 NOTE — DISCHARGE INSTR - OTHER ORDERS
You will discharge home to [address]; Phone:  [phone]  Crisis Plan:    Triggers you identified during your hospital stay that led to substance abuse included:  Ineffective coping with chronic back pain; poor stress management; interpersonal conflicts; loss of employment; financial stressors; and increased anxiety / depression  Coping skills you identified during your hospital stay include:  Sports coaching, hunting, fishing, camping, and reading sports books  After discharge, if you find your coping skills are not effective and you continue to feel distressed, please talk with trusted family members / friends and / or contact your [facility] therapist and / or [facility] counselor  If that is not effective and you feel you are a danger to yourself or others, please contact [address] [place] [address] Turnpike: [phone], [address] [place] [address] Street:  [phone], Peer Support Talk Line (Seven days a week, 1:00 PM - 9:00 PM) Call: [phone] or Text: [phone], Alcohol Anonymous: 226.694.7661, [place] [facility]: 136.502.3387, Jerrica on [address] [place] [address] ([facility]) HELPLINE: [phone]/Email: www [employer], or Substance Abuse and Mental Health Services Administration([facility]) [employer], which is a confidential, free, 24-hour-a-day, 365-day-a-year, information service for individuals and family members facing mental health and/or substance use disorders  This service provides referrals to local treatment facilities, support groups, and community-based organizations  Callers can also order free publications and other information    Call [phone]/OXTYA: www [facility] gov

## 2019-11-27 NOTE — SOCIAL WORK
SW met with pt for completion of psycho/social assessment during which pt presented as flat / distressed regarding addiction to over-the-counter medications and the devastating impact his addictive behavioral           pattern has had on his personal and family life and employment / job loss  Pt noted that trigger for hospitalization was racing heart regarding which he feared heart attack, following which EMS transported him to [address] [facility]  Pt's goal for hospitalization is to obtain aftercare services so as to help him terminate addiction / dysfunctional lifestyle  He identified his strengths as being an intelligent individual and hard worker  Pt thinks he can benefit by managing stress better and gaining self-control  His DX include substance abuse / addiction and depression, having noted that [facility]  Pt denied the following:  Current / past Azalee Sender / Cloyce Lord / self-harm / Catana Italian / Lamar Marly / aggression / [place] Cones / Juli Eis / Claretha Smaller / delusions  Reportedly, pt has no access to firearms and he does not think he is at risk for harming self / others  Pt denied the following:  Current / past experience of any abuse / family HX of MH and D&A problems and of suicide / homicide  He stated that he has never used marijuana nor other illegal substances  His PCP previously prescribed him Hydrocodone and Oxycodone for TX of his chronic back pain sustained as a long-time sports fighter  Currently, pt's drugs of choice are over-the-counter meds of Imodium, Benadryl, and Muscinex DM, all of which he has been using in large quantities, having noted that he wants to escape the pain of withdrawal   Pt denies use of alcohol  Pt noted that his wife [facility], 15year-old son Nia Montenegro, and parents, Dayanna Goodman and [employer], are much distressed by pt's addiction and support his agreement for receipt of TX to terminate same    Pt stated that he has never participated in rehab and does not want referral for IP rehab, but will seek OP rehab regarding which SW facilitated his call to [facility] whose rep scheduled pt for rehab assessment provided by counselor [facility] BERENICE, scheduled for 12/2/19 at 10 am   Pt denied current / past legal concerns  Pt is  and, as the couple lost their home through foreclosure, they live in [place] [address], although their son, whom they see daily, remains living with pt's supportive parents so as to attend [employer] at which he excels in sports  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  He is not close to his sister who lives locally  Pt achieved a MS in Education and, 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, 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, due to conflicts with an   Pt is considering applying to private schools in the area, although he would prefer a public school position  He is Oriental orthodox and has no cultural needs  Pt drives and relies on income from savings and his wife's salary as a teacher in a [employer] private school at which pt was not hired, resulting in pt's stressful response to same  His insurance covers medications and his preferred pharmacy is [facility] [place]  Pt's PCP discharged pt from his practice, reportedly due to pt's rude and demanding behavior  Pt noted that he prefers to find his own PCP  He agreed to referral to [facility], at which agency NISH scheduled an Intake appointment in his behalf with ALESSIO on 12/9/19 at 1:15 pm    Pt's coping skills include sports coaching, hunting, fishing, camping, and reading sports books

## 2019-11-27 NOTE — PROGRESS NOTES
Patient was withdrawn to his room in bed all evening  Answered all questions with one word answers  Would not roll over or look at this nurse during assessment  Denied SI/HI/Hanna  No c/o withdrawal symptoms  Will continue to observe

## 2019-11-27 NOTE — PROGRESS NOTES
Patient has been sleeping well through the night  No changes or problems  No s/s withdrawal  Q 7 minute safety checks maintained

## 2019-11-27 NOTE — PLAN OF CARE
Problem: Depression  Goal: Treatment Goal: Demonstrate behavioral control of depressive symptoms, verbalize feelings of improved mood/affect, and adopt new coping skills prior to discharge  Outcome: Adequate for Discharge  Goal: Verbalize thoughts and feelings  Description  Interventions:  - Assess and re-assess patient's level of risk   - Engage patient in 1:1 interactions, daily, for a minimum of 15 minutes   - Encourage patient to express feelings, fears, frustrations, hopes   Outcome: Adequate for Discharge  Goal: Refrain from harming self  Description  Interventions:  - Monitor patient closely, per order   - Supervise medication ingestion, monitor effects and side effects   Outcome: Adequate for Discharge  Goal: Refrain from isolation  Description  Interventions:  - Develop a trusting relationship   - Encourage socialization   Outcome: Adequate for Discharge  Goal: Refrain from self-neglect  Outcome: Adequate for Discharge  Goal: Attend and participate in unit activities, including therapeutic, recreational, and educational groups  Description  Interventions:  - Provide therapeutic and educational activities daily, encourage attendance and participation, and document same in the medical record   Outcome: Adequate for Discharge  Goal: Complete daily ADLs, including personal hygiene independently, as able  Description  Interventions:  - Observe, teach, and assist patient with ADLS  -  Monitor and promote a balance of rest/activity, with adequate nutrition and elimination   Outcome: Adequate for Discharge     Problem: ANXIETY  Goal: Will report anxiety at manageable levels  Description  INTERVENTIONS:  - Administer medication as ordered  - Teach and encourage coping skills  - Provide emotional support  - Assess patient/family for anxiety and ability to cope  Outcome: Adequate for Discharge  Goal: By discharge: Patient will verbalize 2 strategies to deal with anxiety  Description  Interventions:  - Identify any obvious source/trigger to anxiety  - Staff will assist patient in applying identified coping technique/skills  - Encourage attendance of scheduled groups and activities  Outcome: Adequate for Discharge     Problem: SUBSTANCE USE/ABUSE  Goal: Will have no detox symptoms and will verbalize plan for changing substance-related behavior  Description  INTERVENTIONS:  - Monitor physical status and assess for symptoms of withdrawal  - Administer medication as ordered  - Provide emotional support with 1 on 1 interaction with staff  - Encourage recovery focused program/ addiction education  - Assess for verbalization of changing behaviors related to substance abuse  - Initiate consults and referrals as appropriate (Case Management, Spiritual Care, etc )  Outcome: Adequate for Discharge  Goal: By discharge, will develop insight into their chemical dependency and sustain motivation to continue in recovery  Description  INTERVENTIONS:  - Attends all daily group sessions and scheduled AA groups  - Actively practices coping skills through participation in the therapeutic community and adherence to program rules  - Reviews and completes assignments from individual treatment plan  - Assist patient development of understanding of their personal cycle of addiction and relapse triggers  Outcome: Adequate for Discharge  Goal: By discharge, patient will have ongoing treatment plan addressing chemical dependency  Description  INTERVENTIONS:  - Assist patient with resources and/or appointments for ongoing recovery based living  Outcome: Adequate for Discharge     Problem: Ineffective Coping  Goal: Participates in unit activities  Description  Interventions:  - Provide therapeutic environment   - Provide required programming   - Redirect inappropriate behaviors   Outcome: Adequate for Discharge     Problem: DISCHARGE PLANNING - CARE MANAGEMENT  Goal: Discharge to post-acute care or home with appropriate resources  Description  INTERVENTIONS:  - Conduct assessment to determine patient/family and health care team treatment goals, and need for post-acute services based on payer coverage, community resources, and patient preferences, and barriers to discharge  - Address psychosocial, clinical, and financial barriers to discharge as identified in assessment in conjunction with the patient/family and health care team  - Arrange appropriate level of post-acute services according to patient's   needs and preference and payer coverage in collaboration with the physician and health care team  - Communicate with and update the patient/family, physician, and health care team regarding progress on the discharge plan  - Arrange appropriate transportation to post-acute venues   Outcome: Adequate for Discharge     Patient adequate for discharge

## 2019-11-27 NOTE — NURSING NOTE
Patient aao x 4 and present on the milieu  Interacts with other patient appropriately and acknowledges discharge today  Denies depression and anxiety  Reports that he has some difficulty sleeping because of light outside pt room and room mates snoring  But denies SI and HI, depression or anxiety  Discharge instructions reviewed with patient

## 2019-11-27 NOTE — PROGRESS NOTES
Pt participated in [facility] during which he agreed with goals and signed [address], having expressed desire for discharge today  Dr Blade Nunez discussed same with pt / team, having expressed that if pt's wife agrees that he will be safe upon discharge, during 's call to her, pt can be discharged today, for which pt was appreciative

## 2020-11-20 ENCOUNTER — OFFICE VISIT (OUTPATIENT)
Dept: URGENT CARE | Facility: CLINIC | Age: 45
End: 2020-11-20
Payer: COMMERCIAL

## 2020-11-20 VITALS
TEMPERATURE: 97.7 F | BODY MASS INDEX: 33.88 KG/M2 | WEIGHT: 242 LBS | OXYGEN SATURATION: 98 % | HEIGHT: 71 IN | RESPIRATION RATE: 18 BRPM | HEART RATE: 125 BPM

## 2020-11-20 DIAGNOSIS — R05.9 COUGH: Primary | ICD-10-CM

## 2020-11-20 PROCEDURE — 99203 OFFICE O/P NEW LOW 30 MIN: CPT | Performed by: PHYSICIAN ASSISTANT

## 2020-11-20 PROCEDURE — U0003 INFECTIOUS AGENT DETECTION BY NUCLEIC ACID (DNA OR RNA); SEVERE ACUTE RESPIRATORY SYNDROME CORONAVIRUS 2 (SARS-COV-2) (CORONAVIRUS DISEASE [COVID-19]), AMPLIFIED PROBE TECHNIQUE, MAKING USE OF HIGH THROUGHPUT TECHNOLOGIES AS DESCRIBED BY CMS-2020-01-R: HCPCS | Performed by: PHYSICIAN ASSISTANT

## 2020-11-20 PROCEDURE — G0382 LEV 3 HOSP TYPE B ED VISIT: HCPCS | Performed by: PHYSICIAN ASSISTANT

## 2020-11-20 PROCEDURE — 99283 EMERGENCY DEPT VISIT LOW MDM: CPT | Performed by: PHYSICIAN ASSISTANT

## 2020-11-20 RX ORDER — ALBUTEROL SULFATE 90 UG/1
2 AEROSOL, METERED RESPIRATORY (INHALATION) 4 TIMES DAILY
Qty: 8 G | Refills: 0 | Status: SHIPPED | OUTPATIENT
Start: 2020-11-20 | End: 2021-01-19 | Stop reason: HOSPADM

## 2020-11-22 LAB — SARS-COV-2 RNA SPEC QL NAA+PROBE: NOT DETECTED

## 2020-12-27 ENCOUNTER — HOSPITAL ENCOUNTER (EMERGENCY)
Facility: HOSPITAL | Age: 45
Discharge: HOME | End: 2020-12-27
Attending: EMERGENCY MEDICINE | Admitting: EMERGENCY MEDICINE
Payer: COMMERCIAL

## 2020-12-27 ENCOUNTER — APPOINTMENT (EMERGENCY)
Dept: RADIOLOGY | Facility: HOSPITAL | Age: 45
End: 2020-12-27
Payer: COMMERCIAL

## 2020-12-27 ENCOUNTER — APPOINTMENT (EMERGENCY)
Dept: CT IMAGING | Facility: HOSPITAL | Age: 45
End: 2020-12-27
Payer: COMMERCIAL

## 2020-12-27 VITALS
SYSTOLIC BLOOD PRESSURE: 152 MMHG | BODY MASS INDEX: 32.19 KG/M2 | RESPIRATION RATE: 18 BRPM | DIASTOLIC BLOOD PRESSURE: 87 MMHG | HEIGHT: 71 IN | OXYGEN SATURATION: 93 % | TEMPERATURE: 97.9 F | HEART RATE: 103 BPM | WEIGHT: 229.94 LBS

## 2020-12-27 DIAGNOSIS — J18.9 PNEUMONIA OF BOTH LOWER LOBES DUE TO INFECTIOUS ORGANISM: Primary | ICD-10-CM

## 2020-12-27 LAB
ALBUMIN SERPL BCP-MCNC: 3.5 G/DL (ref 3.5–5)
ALP SERPL-CCNC: 88 U/L (ref 46–116)
ALT SERPL W P-5'-P-CCNC: 257 U/L (ref 12–78)
ANION GAP SERPL CALCULATED.3IONS-SCNC: 8 MMOL/L (ref 4–13)
AST SERPL W P-5'-P-CCNC: 335 U/L (ref 5–45)
BASOPHILS # BLD AUTO: 0.05 THOUSANDS/ΜL (ref 0–0.1)
BASOPHILS NFR BLD AUTO: 1 % (ref 0–1)
BILIRUB SERPL-MCNC: 1.2 MG/DL (ref 0.2–1)
BUN SERPL-MCNC: 10 MG/DL (ref 5–25)
CALCIUM SERPL-MCNC: 8.5 MG/DL (ref 8.3–10.1)
CHLORIDE SERPL-SCNC: 98 MMOL/L (ref 100–108)
CO2 SERPL-SCNC: 30 MMOL/L (ref 21–32)
CREAT SERPL-MCNC: 0.95 MG/DL (ref 0.6–1.3)
EOSINOPHIL # BLD AUTO: 0.04 THOUSAND/ΜL (ref 0–0.61)
EOSINOPHIL NFR BLD AUTO: 1 % (ref 0–6)
ERYTHROCYTE [DISTWIDTH] IN BLOOD BY AUTOMATED COUNT: 15.2 % (ref 11.6–15.1)
FLUAV RNA RESP QL NAA+PROBE: NEGATIVE
FLUBV RNA RESP QL NAA+PROBE: NEGATIVE
GFR SERPL CREATININE-BSD FRML MDRD: 96 ML/MIN/1.73SQ M
GLUCOSE SERPL-MCNC: 130 MG/DL (ref 65–140)
HCT VFR BLD AUTO: 46.3 % (ref 36.5–49.3)
HGB BLD-MCNC: 16.1 G/DL (ref 12–17)
IMM GRANULOCYTES # BLD AUTO: 0.03 THOUSAND/UL (ref 0–0.2)
IMM GRANULOCYTES NFR BLD AUTO: 1 % (ref 0–2)
LACTATE SERPL-SCNC: 2.1 MMOL/L (ref 0.5–2)
LIPASE SERPL-CCNC: 222 U/L (ref 73–393)
LYMPHOCYTES # BLD AUTO: 0.94 THOUSANDS/ΜL (ref 0.6–4.47)
LYMPHOCYTES NFR BLD AUTO: 16 % (ref 14–44)
MAGNESIUM SERPL-MCNC: 1.6 MG/DL (ref 1.6–2.6)
MCH RBC QN AUTO: 35.1 PG (ref 26.8–34.3)
MCHC RBC AUTO-ENTMCNC: 34.8 G/DL (ref 31.4–37.4)
MCV RBC AUTO: 101 FL (ref 82–98)
MONOCYTES # BLD AUTO: 0.47 THOUSAND/ΜL (ref 0.17–1.22)
MONOCYTES NFR BLD AUTO: 8 % (ref 4–12)
NEUTROPHILS # BLD AUTO: 4.35 THOUSANDS/ΜL (ref 1.85–7.62)
NEUTS SEG NFR BLD AUTO: 73 % (ref 43–75)
NRBC BLD AUTO-RTO: 0 /100 WBCS
NT-PROBNP SERPL-MCNC: 17 PG/ML
PLATELET # BLD AUTO: 189 THOUSANDS/UL (ref 149–390)
PMV BLD AUTO: 9.6 FL (ref 8.9–12.7)
POTASSIUM SERPL-SCNC: 3 MMOL/L (ref 3.5–5.3)
PROT SERPL-MCNC: 7.8 G/DL (ref 6.4–8.2)
RBC # BLD AUTO: 4.59 MILLION/UL (ref 3.88–5.62)
RSV RNA RESP QL NAA+PROBE: NEGATIVE
SARS-COV-2 RNA RESP QL NAA+PROBE: NEGATIVE
SODIUM SERPL-SCNC: 136 MMOL/L (ref 136–145)
TROPONIN I SERPL-MCNC: <0.02 NG/ML
WBC # BLD AUTO: 5.88 THOUSAND/UL (ref 4.31–10.16)

## 2020-12-27 PROCEDURE — 84484 ASSAY OF TROPONIN QUANT: CPT | Performed by: EMERGENCY MEDICINE

## 2020-12-27 PROCEDURE — 83880 ASSAY OF NATRIURETIC PEPTIDE: CPT | Performed by: EMERGENCY MEDICINE

## 2020-12-27 PROCEDURE — 85025 COMPLETE CBC W/AUTO DIFF WBC: CPT | Performed by: EMERGENCY MEDICINE

## 2020-12-27 PROCEDURE — 71275 CT ANGIOGRAPHY CHEST: CPT

## 2020-12-27 PROCEDURE — 93005 ELECTROCARDIOGRAM TRACING: CPT

## 2020-12-27 PROCEDURE — G1004 CDSM NDSC: HCPCS

## 2020-12-27 PROCEDURE — 99284 EMERGENCY DEPT VISIT MOD MDM: CPT | Performed by: EMERGENCY MEDICINE

## 2020-12-27 PROCEDURE — 94640 AIRWAY INHALATION TREATMENT: CPT

## 2020-12-27 PROCEDURE — 96361 HYDRATE IV INFUSION ADD-ON: CPT

## 2020-12-27 PROCEDURE — 96374 THER/PROPH/DIAG INJ IV PUSH: CPT

## 2020-12-27 PROCEDURE — 71045 X-RAY EXAM CHEST 1 VIEW: CPT

## 2020-12-27 PROCEDURE — 0241U HB NFCT DS VIR RESP RNA 4 TRGT: CPT | Performed by: EMERGENCY MEDICINE

## 2020-12-27 PROCEDURE — 83690 ASSAY OF LIPASE: CPT | Performed by: EMERGENCY MEDICINE

## 2020-12-27 PROCEDURE — 99284 EMERGENCY DEPT VISIT MOD MDM: CPT

## 2020-12-27 PROCEDURE — 36415 COLL VENOUS BLD VENIPUNCTURE: CPT | Performed by: EMERGENCY MEDICINE

## 2020-12-27 PROCEDURE — 83735 ASSAY OF MAGNESIUM: CPT | Performed by: EMERGENCY MEDICINE

## 2020-12-27 PROCEDURE — 83605 ASSAY OF LACTIC ACID: CPT | Performed by: EMERGENCY MEDICINE

## 2020-12-27 PROCEDURE — 80053 COMPREHEN METABOLIC PANEL: CPT | Performed by: EMERGENCY MEDICINE

## 2020-12-27 PROCEDURE — 87040 BLOOD CULTURE FOR BACTERIA: CPT | Performed by: EMERGENCY MEDICINE

## 2020-12-27 RX ORDER — POTASSIUM CHLORIDE 20 MEQ/1
40 TABLET, EXTENDED RELEASE ORAL ONCE
Status: COMPLETED | OUTPATIENT
Start: 2020-12-27 | End: 2020-12-27

## 2020-12-27 RX ORDER — CEFPODOXIME PROXETIL 200 MG/1
200 TABLET, FILM COATED ORAL 2 TIMES DAILY
Qty: 14 TABLET | Refills: 0 | Status: SHIPPED | OUTPATIENT
Start: 2020-12-27 | End: 2021-01-03

## 2020-12-27 RX ORDER — ONDANSETRON 2 MG/ML
4 INJECTION INTRAMUSCULAR; INTRAVENOUS ONCE
Status: COMPLETED | OUTPATIENT
Start: 2020-12-27 | End: 2020-12-27

## 2020-12-27 RX ORDER — SODIUM CHLORIDE 9 MG/ML
3 INJECTION INTRAVENOUS
Status: DISCONTINUED | OUTPATIENT
Start: 2020-12-27 | End: 2020-12-27 | Stop reason: HOSPADM

## 2020-12-27 RX ORDER — AZITHROMYCIN 250 MG/1
250 TABLET, FILM COATED ORAL EVERY 24 HOURS
Qty: 4 TABLET | Refills: 0 | Status: SHIPPED | OUTPATIENT
Start: 2020-12-28 | End: 2021-01-01

## 2020-12-27 RX ORDER — IPRATROPIUM BROMIDE AND ALBUTEROL SULFATE 2.5; .5 MG/3ML; MG/3ML
3 SOLUTION RESPIRATORY (INHALATION) ONCE
Status: COMPLETED | OUTPATIENT
Start: 2020-12-27 | End: 2020-12-27

## 2020-12-27 RX ORDER — AZITHROMYCIN 250 MG/1
500 TABLET, FILM COATED ORAL ONCE
Status: COMPLETED | OUTPATIENT
Start: 2020-12-27 | End: 2020-12-27

## 2020-12-27 RX ORDER — ALBUTEROL SULFATE 90 UG/1
2 AEROSOL, METERED RESPIRATORY (INHALATION) EVERY 4 HOURS PRN
Qty: 1 INHALER | Refills: 0 | Status: ON HOLD | OUTPATIENT
Start: 2020-12-27 | End: 2021-01-19 | Stop reason: SDUPTHER

## 2020-12-27 RX ADMIN — ONDANSETRON 4 MG: 2 INJECTION INTRAMUSCULAR; INTRAVENOUS at 14:19

## 2020-12-27 RX ADMIN — IPRATROPIUM BROMIDE AND ALBUTEROL SULFATE 3 ML: 2.5; .5 SOLUTION RESPIRATORY (INHALATION) at 14:26

## 2020-12-27 RX ADMIN — AZITHROMYCIN 500 MG: 250 TABLET, FILM COATED ORAL at 16:31

## 2020-12-27 RX ADMIN — POTASSIUM CHLORIDE 40 MEQ: 1500 TABLET, EXTENDED RELEASE ORAL at 14:53

## 2020-12-27 RX ADMIN — IOHEXOL 85 ML: 350 INJECTION, SOLUTION INTRAVENOUS at 15:18

## 2020-12-27 RX ADMIN — SODIUM CHLORIDE 1000 ML: 0.9 INJECTION, SOLUTION INTRAVENOUS at 14:15

## 2020-12-28 LAB
ATRIAL RATE: 103 BPM
P AXIS: 47 DEGREES
PR INTERVAL: 138 MS
QRS AXIS: -24 DEGREES
QRSD INTERVAL: 88 MS
QT INTERVAL: 372 MS
QTC INTERVAL: 487 MS
T WAVE AXIS: 36 DEGREES
VENTRICULAR RATE: 103 BPM

## 2020-12-28 PROCEDURE — 93010 ELECTROCARDIOGRAM REPORT: CPT | Performed by: INTERNAL MEDICINE

## 2021-01-02 LAB
BACTERIA BLD CULT: NORMAL
BACTERIA BLD CULT: NORMAL

## 2021-01-11 ENCOUNTER — APPOINTMENT (EMERGENCY)
Dept: CT IMAGING | Facility: HOSPITAL | Age: 46
End: 2021-01-11
Payer: COMMERCIAL

## 2021-01-11 ENCOUNTER — HOSPITAL ENCOUNTER (EMERGENCY)
Facility: HOSPITAL | Age: 46
Discharge: HOME | End: 2021-01-11
Attending: EMERGENCY MEDICINE | Admitting: EMERGENCY MEDICINE
Payer: COMMERCIAL

## 2021-01-11 VITALS
SYSTOLIC BLOOD PRESSURE: 164 MMHG | TEMPERATURE: 98.4 F | DIASTOLIC BLOOD PRESSURE: 98 MMHG | RESPIRATION RATE: 18 BRPM | WEIGHT: 225.97 LBS | OXYGEN SATURATION: 93 % | BODY MASS INDEX: 31.52 KG/M2 | HEART RATE: 90 BPM

## 2021-01-11 DIAGNOSIS — B34.9 SYSTEMIC VIRAL ILLNESS: ICD-10-CM

## 2021-01-11 DIAGNOSIS — E87.6 HYPOKALEMIA: Primary | ICD-10-CM

## 2021-01-11 DIAGNOSIS — E86.0 DEHYDRATION: ICD-10-CM

## 2021-01-11 LAB
ALBUMIN SERPL BCP-MCNC: 3.2 G/DL (ref 3.5–5)
ALP SERPL-CCNC: 100 U/L (ref 46–116)
ALT SERPL W P-5'-P-CCNC: 148 U/L (ref 12–78)
ANION GAP SERPL CALCULATED.3IONS-SCNC: 11 MMOL/L (ref 4–13)
APTT PPP: 25 SECONDS (ref 23–37)
AST SERPL W P-5'-P-CCNC: 179 U/L (ref 5–45)
BACTERIA UR QL AUTO: ABNORMAL /HPF
BASE EX.OXY STD BLDV CALC-SCNC: 67.9 % (ref 60–80)
BASE EXCESS BLDV CALC-SCNC: -0.6 MMOL/L
BASOPHILS # BLD AUTO: 0.09 THOUSANDS/ΜL (ref 0–0.1)
BASOPHILS NFR BLD AUTO: 1 % (ref 0–1)
BILIRUB SERPL-MCNC: 1.4 MG/DL (ref 0.2–1)
BILIRUB UR QL STRIP: NEGATIVE
BUN SERPL-MCNC: 12 MG/DL (ref 5–25)
CALCIUM ALBUM COR SERPL-MCNC: 9.5 MG/DL (ref 8.3–10.1)
CALCIUM SERPL-MCNC: 8.9 MG/DL (ref 8.3–10.1)
CHLORIDE SERPL-SCNC: 98 MMOL/L (ref 100–108)
CLARITY UR: CLEAR
CO2 SERPL-SCNC: 26 MMOL/L (ref 21–32)
COLOR UR: ABNORMAL
CREAT SERPL-MCNC: 0.99 MG/DL (ref 0.6–1.3)
EOSINOPHIL # BLD AUTO: 0.03 THOUSAND/ΜL (ref 0–0.61)
EOSINOPHIL NFR BLD AUTO: 0 % (ref 0–6)
ERYTHROCYTE [DISTWIDTH] IN BLOOD BY AUTOMATED COUNT: 14.8 % (ref 11.6–15.1)
GFR SERPL CREATININE-BSD FRML MDRD: 91 ML/MIN/1.73SQ M
GLUCOSE SERPL-MCNC: 112 MG/DL (ref 65–140)
GLUCOSE UR STRIP-MCNC: NEGATIVE MG/DL
HCO3 BLDV-SCNC: 21.8 MMOL/L (ref 24–30)
HCT VFR BLD AUTO: 47.2 % (ref 36.5–49.3)
HGB BLD-MCNC: 16.4 G/DL (ref 12–17)
HGB UR QL STRIP.AUTO: ABNORMAL
IMM GRANULOCYTES # BLD AUTO: 0.02 THOUSAND/UL (ref 0–0.2)
IMM GRANULOCYTES NFR BLD AUTO: 0 % (ref 0–2)
INR PPP: 1.09 (ref 0.84–1.19)
KETONES UR STRIP-MCNC: NEGATIVE MG/DL
LACTATE SERPL-SCNC: 1.9 MMOL/L (ref 0.5–2)
LACTATE SERPL-SCNC: 3.1 MMOL/L (ref 0.5–2)
LEUKOCYTE ESTERASE UR QL STRIP: NEGATIVE
LIPASE SERPL-CCNC: 351 U/L (ref 73–393)
LYMPHOCYTES # BLD AUTO: 1.29 THOUSANDS/ΜL (ref 0.6–4.47)
LYMPHOCYTES NFR BLD AUTO: 17 % (ref 14–44)
MAGNESIUM SERPL-MCNC: 1.7 MG/DL (ref 1.6–2.6)
MCH RBC QN AUTO: 35.9 PG (ref 26.8–34.3)
MCHC RBC AUTO-ENTMCNC: 34.7 G/DL (ref 31.4–37.4)
MCV RBC AUTO: 103 FL (ref 82–98)
MONOCYTES # BLD AUTO: 0.58 THOUSAND/ΜL (ref 0.17–1.22)
MONOCYTES NFR BLD AUTO: 8 % (ref 4–12)
MUCOUS THREADS UR QL AUTO: ABNORMAL
NEUTROPHILS # BLD AUTO: 5.66 THOUSANDS/ΜL (ref 1.85–7.62)
NEUTS SEG NFR BLD AUTO: 74 % (ref 43–75)
NITRITE UR QL STRIP: NEGATIVE
NON-SQ EPI CELLS URNS QL MICRO: ABNORMAL /HPF
NRBC BLD AUTO-RTO: 0 /100 WBCS
NT-PROBNP SERPL-MCNC: 57 PG/ML
O2 CT BLDV-SCNC: 14.1 ML/DL
PCO2 BLDV: 30 MM HG (ref 42–50)
PH BLDV: 7.48 [PH] (ref 7.3–7.4)
PH UR STRIP.AUTO: 6.5 [PH]
PLATELET # BLD AUTO: 168 THOUSANDS/UL (ref 149–390)
PMV BLD AUTO: 9.4 FL (ref 8.9–12.7)
PO2 BLDV: 35.1 MM HG (ref 35–45)
POTASSIUM SERPL-SCNC: 3 MMOL/L (ref 3.5–5.3)
PROCALCITONIN SERPL-MCNC: 11.02 NG/ML
PROT SERPL-MCNC: 7.4 G/DL (ref 6.4–8.2)
PROT UR STRIP-MCNC: ABNORMAL MG/DL
PROTHROMBIN TIME: 13.9 SECONDS (ref 11.6–14.5)
RBC # BLD AUTO: 4.57 MILLION/UL (ref 3.88–5.62)
RBC #/AREA URNS AUTO: ABNORMAL /HPF
SODIUM SERPL-SCNC: 135 MMOL/L (ref 136–145)
SP GR UR STRIP.AUTO: 1.02 (ref 1–1.03)
UROBILINOGEN UR QL STRIP.AUTO: 0.2 E.U./DL
WBC # BLD AUTO: 7.67 THOUSAND/UL (ref 4.31–10.16)
WBC #/AREA URNS AUTO: ABNORMAL /HPF

## 2021-01-11 PROCEDURE — 96365 THER/PROPH/DIAG IV INF INIT: CPT

## 2021-01-11 PROCEDURE — 83735 ASSAY OF MAGNESIUM: CPT | Performed by: EMERGENCY MEDICINE

## 2021-01-11 PROCEDURE — 85730 THROMBOPLASTIN TIME PARTIAL: CPT | Performed by: EMERGENCY MEDICINE

## 2021-01-11 PROCEDURE — 80053 COMPREHEN METABOLIC PANEL: CPT | Performed by: EMERGENCY MEDICINE

## 2021-01-11 PROCEDURE — 81001 URINALYSIS AUTO W/SCOPE: CPT | Performed by: EMERGENCY MEDICINE

## 2021-01-11 PROCEDURE — 82805 BLOOD GASES W/O2 SATURATION: CPT | Performed by: EMERGENCY MEDICINE

## 2021-01-11 PROCEDURE — 83605 ASSAY OF LACTIC ACID: CPT | Performed by: EMERGENCY MEDICINE

## 2021-01-11 PROCEDURE — 36415 COLL VENOUS BLD VENIPUNCTURE: CPT | Performed by: EMERGENCY MEDICINE

## 2021-01-11 PROCEDURE — 83690 ASSAY OF LIPASE: CPT | Performed by: EMERGENCY MEDICINE

## 2021-01-11 PROCEDURE — 85025 COMPLETE CBC W/AUTO DIFF WBC: CPT | Performed by: EMERGENCY MEDICINE

## 2021-01-11 PROCEDURE — 70450 CT HEAD/BRAIN W/O DYE: CPT

## 2021-01-11 PROCEDURE — 84145 PROCALCITONIN (PCT): CPT | Performed by: EMERGENCY MEDICINE

## 2021-01-11 PROCEDURE — 96375 TX/PRO/DX INJ NEW DRUG ADDON: CPT

## 2021-01-11 PROCEDURE — 87040 BLOOD CULTURE FOR BACTERIA: CPT | Performed by: EMERGENCY MEDICINE

## 2021-01-11 PROCEDURE — G1004 CDSM NDSC: HCPCS

## 2021-01-11 PROCEDURE — 93005 ELECTROCARDIOGRAM TRACING: CPT

## 2021-01-11 PROCEDURE — 85610 PROTHROMBIN TIME: CPT | Performed by: EMERGENCY MEDICINE

## 2021-01-11 PROCEDURE — 96367 TX/PROPH/DG ADDL SEQ IV INF: CPT

## 2021-01-11 PROCEDURE — 99285 EMERGENCY DEPT VISIT HI MDM: CPT | Performed by: EMERGENCY MEDICINE

## 2021-01-11 PROCEDURE — 99285 EMERGENCY DEPT VISIT HI MDM: CPT

## 2021-01-11 PROCEDURE — 83880 ASSAY OF NATRIURETIC PEPTIDE: CPT | Performed by: EMERGENCY MEDICINE

## 2021-01-11 PROCEDURE — 96361 HYDRATE IV INFUSION ADD-ON: CPT

## 2021-01-11 PROCEDURE — 71250 CT THORAX DX C-: CPT

## 2021-01-11 RX ORDER — ONDANSETRON 2 MG/ML
4 INJECTION INTRAMUSCULAR; INTRAVENOUS ONCE
Status: COMPLETED | OUTPATIENT
Start: 2021-01-11 | End: 2021-01-11

## 2021-01-11 RX ORDER — POTASSIUM CHLORIDE 20MEQ/15ML
40 LIQUID (ML) ORAL ONCE
Status: COMPLETED | OUTPATIENT
Start: 2021-01-11 | End: 2021-01-11

## 2021-01-11 RX ORDER — METOCLOPRAMIDE HYDROCHLORIDE 5 MG/ML
10 INJECTION INTRAMUSCULAR; INTRAVENOUS ONCE
Status: COMPLETED | OUTPATIENT
Start: 2021-01-11 | End: 2021-01-11

## 2021-01-11 RX ORDER — LEVOFLOXACIN 750 MG/1
750 TABLET ORAL EVERY 24 HOURS
Qty: 5 TABLET | Refills: 0 | Status: SHIPPED | OUTPATIENT
Start: 2021-01-11 | End: 2021-01-19 | Stop reason: HOSPADM

## 2021-01-11 RX ORDER — DIPHENHYDRAMINE HYDROCHLORIDE 50 MG/ML
25 INJECTION INTRAMUSCULAR; INTRAVENOUS ONCE
Status: COMPLETED | OUTPATIENT
Start: 2021-01-11 | End: 2021-01-11

## 2021-01-11 RX ORDER — POTASSIUM CHLORIDE 20 MEQ/1
20 TABLET, EXTENDED RELEASE ORAL 2 TIMES DAILY
Qty: 6 TABLET | Refills: 0 | Status: SHIPPED | OUTPATIENT
Start: 2021-01-11 | End: 2021-01-16

## 2021-01-11 RX ORDER — METOCLOPRAMIDE 10 MG/1
10 TABLET ORAL EVERY 6 HOURS PRN
Qty: 24 TABLET | Refills: 0 | Status: SHIPPED | OUTPATIENT
Start: 2021-01-11 | End: 2021-01-16

## 2021-01-11 RX ORDER — POTASSIUM CHLORIDE 14.9 MG/ML
20 INJECTION INTRAVENOUS ONCE
Status: COMPLETED | OUTPATIENT
Start: 2021-01-11 | End: 2021-01-11

## 2021-01-11 RX ADMIN — POTASSIUM CHLORIDE 40 MEQ: 20 SOLUTION ORAL at 14:41

## 2021-01-11 RX ADMIN — FAMOTIDINE 20 MG: 10 INJECTION, SOLUTION INTRAVENOUS at 13:40

## 2021-01-11 RX ADMIN — POTASSIUM CHLORIDE 20 MEQ: 14.9 INJECTION, SOLUTION INTRAVENOUS at 15:18

## 2021-01-11 RX ADMIN — DIPHENHYDRAMINE HYDROCHLORIDE 25 MG: 50 INJECTION, SOLUTION INTRAMUSCULAR; INTRAVENOUS at 16:16

## 2021-01-11 RX ADMIN — ONDANSETRON HYDROCHLORIDE 4 MG: 2 INJECTION, SOLUTION INTRAVENOUS at 13:40

## 2021-01-11 RX ADMIN — METOCLOPRAMIDE HYDROCHLORIDE 10 MG: 5 INJECTION INTRAMUSCULAR; INTRAVENOUS at 16:16

## 2021-01-11 RX ADMIN — SODIUM CHLORIDE 1000 ML: 0.9 INJECTION, SOLUTION INTRAVENOUS at 13:36

## 2021-01-11 RX ADMIN — SODIUM CHLORIDE, SODIUM LACTATE, POTASSIUM CHLORIDE, AND CALCIUM CHLORIDE 1000 ML: .6; .31; .03; .02 INJECTION, SOLUTION INTRAVENOUS at 15:15

## 2021-01-11 NOTE — Clinical Note
Naima [place] was seen and treated in our emergency department on 1/11/2021  Diagnosis:     Govind Forget    He may return on this date: 01/18/2021    Mr Pascale Henao was seen in the [address] [place] [address] Emergency Department on 11 January 2021  He was under my care at that time and was discharged  He remained ill at the time of discharge and, in my recommendation, would reasonably be able to attend court in 1 week (18 January)  If you have any questions or concerns, please don't hesitate to call        Prince Escoto DO    ______________________________           _______________          _______________  Hospital Representative                              Date                                Time

## 2021-01-11 NOTE — Clinical Note
Eun Martinez was seen and treated in our emergency department on 1/11/2021  Diagnosis:     Cooper Pascual    He may return on this date: 01/18/2021    Mr Jes Moreno was seen in the [address] [place] [address] Emergency Department on 11 January 2021  He was under my care at that time and was discharged  He remained ill at the time of discharge and, in my recommendation, would reasonably be able to attend court in 1 week (18 January)  If you have any questions or concerns, please don't hesitate to call        Nevaeh Wheatley, DO    ______________________________           _______________          _______________  Hospital Representative                              Date                                Time

## 2021-01-11 NOTE — ED PROVIDER NOTES
History  Chief Complaint   Patient presents with    Weakness - Generalized     pt states he was discharged from here two weeks ago with vomiting, headaches, decreased appetite, and cough  pt states he is not feeling any better     This 79-year-old male with the noted past medical hx who presents to the emergency department stating that he has been having ongoing malaise with a phlegmy cough and generalized fatigue/weakness since the end of November 2020  He was seen in this emergency department on 27 December for the same symptoms; he underwent a broad diagnostic workup including testing for novel coronavirus that was negative and CT of the chest demonstrated no evidence of pulmonary embolism but equivocal evidence of bibasilar pneumonia for which he was treated with antibiotic therapy (azithromycin and cefpodoxime)  He states he did not receive any benefit from these and has continued to have systemic symptoms  In particular, he has daily significant fatigue and malaise with a cough intermittently productive of a dark phlegm as well as multiple episodes of nonbilious/nonbloody stool and nonbloody diarrhea  This episode began initially with nasal congestion and profound odynophagia, which have since resolved  Concurrent with this, he has noted dull/throbbing bifrontal headache that been ongoing at least since early fall 2020 and preceding his current respiratory symptoms  He denies any preceding trauma/head injury before his headaches began  He had no visual changes associated with this and no difficulties with strength/gait/balance  He did not have a prior history of regular or recurrent headaches or similar pulmonary symptoms prior to the current episode  He has not been taking using anything for his symptoms currently      A/p:  Recurrent systemic illness with respiratory focus with negative COVID testing and equivocal CT for pneumonia treated appropriately for community-acquired pneumonia but with recurrent symptoms  Check sepsis labs and CT chest for evidence of recurrent/complicating factors and to assess volume status  History provided by:  Patient and medical records      Prior to Admission Medications   Prescriptions Last Dose Informant Patient Reported? Taking? albuterol (PROVENTIL HFA,VENTOLIN HFA) 90 mcg/act inhaler   No No   Sig: Inhale 2 puffs 4 (four) times a day   albuterol (PROVENTIL HFA,VENTOLIN HFA) 90 mcg/act inhaler   No No   Sig: Inhale 2 puffs every 4 (four) hours as needed for wheezing   bisoprolol (ZEBETA) 5 mg tablet   Yes No   Sig: Take 5 mg by mouth daily   ergocalciferol (VITAMIN D2) 50,000 units   No No   Sig: Take 1 capsule by mouth once a week for 8 doses   lamoTRIgine (LaMICtal) 25 mg tablet   No No   Sig: Take 1 tablet (25 mg total) by mouth daily   lisinopril (ZESTRIL) 10 mg tablet   Yes No   Sig: Take 10 mg by mouth daily      Facility-Administered Medications: None       Past Medical History:   Diagnosis Date    Biceps tendon tear     Carpal tunnel syndrome     Chronic pain     back    Hypertension        Past Surgical History:   Procedure Laterality Date    CARPAL TUNNEL RELEASE      ROTATOR CUFF REPAIR      ULNAR NERVE REPAIR         History reviewed  No pertinent family history  I have reviewed and agree with the history as documented  E-Cigarette/Vaping    E-Cigarette Use Never User      E-Cigarette/Vaping Substances     Social History     Tobacco Use    Smoking status: Current Every Day Smoker     Packs/day: 1 00     Years: 25 00     Pack years: 25 00    Smokeless tobacco: Never Used   Substance Use Topics    Alcohol use: Yes     Alcohol/week: 0 0 standard drinks     Frequency: 4 or more times a week     Drinks per session: 3 or 4     Binge frequency: Daily or almost daily    Drug use: Not Currently     Types: Oxycodone, Benzodiazepines     Comment: pt admits to use of oxycodone and xanax         Review of Systems   Constitutional: Positive for chills and fatigue  Negative for fever  HENT: Positive for sore throat (previously; now resolved)  Negative for congestion and trouble swallowing  Respiratory: Positive for cough, chest tightness and shortness of breath  Cardiovascular: Negative for chest pain and palpitations  Gastrointestinal: Positive for diarrhea, nausea and vomiting  Negative for abdominal pain  Genitourinary: Negative for difficulty urinating, dysuria (Reports very dark urine) and flank pain  Skin: Negative for color change, pallor, rash and wound  Neurological: Positive for weakness (generalized/nonfocal weakness) and light-headedness  Negative for dizziness, syncope, numbness and headaches  All other systems reviewed and are negative  Physical Exam  Physical Exam  Vitals signs and nursing note reviewed  Constitutional:       General: He is awake  He is in acute distress (mild painful distress)  Appearance: He is well-developed  HENT:      Head: Normocephalic and atraumatic  Right Ear: Hearing and external ear normal       Left Ear: Hearing and external ear normal    Eyes:      Extraocular Movements: Extraocular movements intact  Conjunctiva/sclera: Conjunctivae normal       Pupils: Pupils are equal, round, and reactive to light  Neck:      Thyroid: No thyroid mass, thyromegaly or thyroid tenderness  Trachea: Trachea and phonation normal    Cardiovascular:      Rate and Rhythm: Regular rhythm  Tachycardia present  Pulses:           Radial pulses are 2+ on the right side and 2+ on the left side  Dorsalis pedis pulses are 2+ on the right side and 2+ on the left side  Posterior tibial pulses are 2+ on the right side and 2+ on the left side  Heart sounds: Normal heart sounds, S1 normal and S2 normal  No murmur  No friction rub  No gallop  Pulmonary:      Effort: Pulmonary effort is normal  No respiratory distress  Breath sounds: Normal breath sounds  No stridor   No decreased breath sounds, wheezing, rhonchi or rales  Abdominal:      General: There is no distension  Palpations: Abdomen is soft  Abdomen is not rigid  There is no mass  Tenderness: There is abdominal tenderness in the epigastric area  There is no guarding or rebound  Musculoskeletal:         General: No tenderness or deformity  Lymphadenopathy:      Cervical: No cervical adenopathy  Skin:     General: Skin is warm and dry  Neurological:      Mental Status: He is alert and oriented to person, place, and time  GCS: GCS eye subscore is 4  GCS verbal subscore is 5  GCS motor subscore is 6  Cranial Nerves: No cranial nerve deficit  Sensory: No sensory deficit  Motor: No abnormal muscle tone  Comments: PERRLA; EOMI  Sensation intact to light touch over face in V1-V3 distribution bilaterally  Facial expressions symmetric  Tongue/uvula midline  Shoulder shrug equal bilaterally  Strength 5/5 in UE/LE bilaterally  Sensation intact to light touch in UE/LE bilaterally           Vital Signs  ED Triage Vitals   Temperature Pulse Respirations Blood Pressure SpO2   01/11/21 1239 01/11/21 1241 01/11/21 1239 01/11/21 1241 01/11/21 1239   98 4 °F (36 9 °C) (!) 118 16 (!) 149/107 97 %      Temp Source Heart Rate Source Patient Position - Orthostatic VS BP Location FiO2 (%)   01/11/21 1239 01/11/21 1239 01/11/21 1239 01/11/21 1239 --   Temporal Monitor Lying Left arm       Pain Score       --                  Vitals:    01/11/21 1500 01/11/21 1530 01/11/21 1630 01/11/21 1830   BP: (!) 152/108 (!) 163/109 (!) 166/112 164/98   Pulse: (!) 109 98 96 90   Patient Position - Orthostatic VS: Lying Lying Lying Lying         Visual Acuity      ED Medications  Medications   ondansetron (ZOFRAN) injection 4 mg (4 mg Intravenous Given 1/11/21 1340)   sodium chloride 0 9 % bolus 1,000 mL (0 mL Intravenous Stopped 1/11/21 1436)   famotidine (PEPCID) injection 20 mg (20 mg Intravenous Given 1/11/21 1340)   potassium chloride oral solution 40 mEq (40 mEq Oral Given 1/11/21 1441)   lactated ringers bolus 1,000 mL (0 mL Intravenous Stopped 1/11/21 1615)   potassium chloride 20 mEq IVPB (premix) (0 mEq Intravenous Stopped 1/11/21 1718)   metoclopramide (REGLAN) injection 10 mg (10 mg Intravenous Given 1/11/21 1616)   diphenhydrAMINE (BENADRYL) injection 25 mg (25 mg Intravenous Given 1/11/21 1616)       Diagnostic Studies  Results Reviewed     Procedure Component Value Units Date/Time    Procalcitonin with AM Reflex [[id number]]  (Abnormal) Collected: 01/11/21 1322    Lab Status: Final result Specimen: Blood from Arm, Right Updated: 01/11/21 1952     Procalcitonin 11 02 ng/ml     Procalcitonin Reflex [[id number]]     Lab Status: No result Specimen: Blood     Urine Microscopic [[id number]]  (Abnormal) Collected: 01/11/21 1734    Lab Status: Final result Specimen: Urine, Clean Catch Updated: 01/11/21 1744     RBC, UA 2-4 /hpf      WBC, UA 0-1 /hpf      Epithelial Cells Occasional /hpf      Bacteria, UA Occasional /hpf      MUCUS THREADS Moderate    UA w Reflex to Microscopic w Reflex to Culture [[id number]]  (Abnormal) Collected: 01/11/21 1734    Lab Status: Final result Specimen: Urine, Clean Catch Updated: 01/11/21 1741     Color, UA Lorie     Clarity, UA Clear     Specific Gravity, UA 1 025     pH, UA 6 5     Leukocytes, UA Negative     Nitrite, UA Negative     Protein,  (2+) mg/dl      Glucose, UA Negative mg/dl      Ketones, UA Negative mg/dl      Urobilinogen, UA 0 2 E U /dl      Bilirubin, UA Negative     Blood, UA Trace    Lactic acid 2 Hours [[id number]]  (Normal) Collected: 01/11/21 1519    Lab Status: Final result Specimen: Blood from Arm, Right Updated: 01/11/21 1542     LACTIC ACID 1 9 mmol/L     Narrative:      Result may be elevated if tourniquet was used during collection      Lipase [[id number]]  (Normal) Collected: 01/11/21 1322    Lab Status: Final result Specimen: Blood from Arm, Right Updated: 01/11/21 6494 Lipase 351 u/L     Magnesium [[id number]]  (Normal) Collected: 01/11/21 1322    Lab Status: Final result Specimen: Blood from Arm, Right Updated: 01/11/21 1354     Magnesium 1 7 mg/dL     NT-BNP PRO [[id number]]  (Normal) Collected: 01/11/21 1322    Lab Status: Final result Specimen: Blood from Arm, Right Updated: 01/11/21 1354     NT-proBNP 57 pg/mL     Lactic acid [[id number]]  (Abnormal) Collected: 01/11/21 1322    Lab Status: Final result Specimen: Blood from Arm, Right Updated: 01/11/21 1351     LACTIC ACID 3 1 mmol/L     Narrative:      Result may be elevated if tourniquet was used during collection      Comprehensive metabolic panel [[id number]]  (Abnormal) Collected: 01/11/21 1322    Lab Status: Final result Specimen: Blood from Arm, Right Updated: 01/11/21 1351     Sodium 135 mmol/L      Potassium 3 0 mmol/L      Chloride 98 mmol/L      CO2 26 mmol/L      ANION GAP 11 mmol/L      BUN 12 mg/dL      Creatinine 0 99 mg/dL      Glucose 112 mg/dL      Calcium 8 9 mg/dL      Corrected Calcium 9 5 mg/dL       U/L       U/L      Alkaline Phosphatase 100 U/L      Total Protein 7 4 g/dL      Albumin 3 2 g/dL      Total Bilirubin 1 40 mg/dL      eGFR 91 ml/min/1 73sq m     Narrative:      Jeremi guidelines for Chronic Kidney Disease (CKD):     Stage 1 with normal or high GFR (GFR > 90 mL/min/1 73 square meters)    Stage 2 Mild CKD (GFR = 60-89 mL/min/1 73 square meters)    Stage 3A Moderate CKD (GFR = 45-59 mL/min/1 73 square meters)    Stage 3B Moderate CKD (GFR = 30-44 mL/min/1 73 square meters)    Stage 4 Severe CKD (GFR = 15-29 mL/min/1 73 square meters)    Stage 5 End Stage CKD (GFR <15 mL/min/1 73 square meters)  Note: GFR calculation is accurate only with a steady state creatinine    Protime-INR [[id number]]  (Normal) Collected: 01/11/21 1322    Lab Status: Final result Specimen: Blood from Arm, Right Updated: 01/11/21 1342     Protime 13 9 seconds      INR 1 09    APTT [[id number]]  (Normal) Collected: 01/11/21 1322    Lab Status: Final result Specimen: Blood from Arm, Right Updated: 01/11/21 1342     PTT 25 seconds     Blood gas, Venous [[id number]]  (Abnormal) Collected: 01/11/21 1322    Lab Status: Final result Specimen: Blood from Arm, Right Updated: 01/11/21 1332     pH, Ab 7 479     pCO2, Ab 30 0 mm Hg      pO2, Ab 35 1 mm Hg      HCO3, Ab 21 8 mmol/L      Base Excess, Ab -0 6 mmol/L      O2 Content, Ab 14 1 ml/dL      O2 HGB, VENOUS 67 9 %     CBC and differential [[id number]]  (Abnormal) Collected: 01/11/21 1322    Lab Status: Final result Specimen: Blood from Arm, Right Updated: 01/11/21 1331     WBC 7 67 Thousand/uL      RBC 4 57 Million/uL      Hemoglobin 16 4 g/dL      Hematocrit 47 2 %       fL      MCH 35 9 pg      MCHC 34 7 g/dL      RDW 14 8 %      MPV 9 4 fL      Platelets 174 Thousands/uL      nRBC 0 /100 WBCs      Neutrophils Relative 74 %      Immat GRANS % 0 %      Lymphocytes Relative 17 %      Monocytes Relative 8 %      Eosinophils Relative 0 %      Basophils Relative 1 %      Neutrophils Absolute 5 66 Thousands/µL      Immature Grans Absolute 0 02 Thousand/uL      Lymphocytes Absolute 1 29 Thousands/µL      Monocytes Absolute 0 58 Thousand/µL      Eosinophils Absolute 0 03 Thousand/µL      Basophils Absolute 0 09 Thousands/µL     Blood culture #2 [[id number]] Collected: 01/11/21 1322    Lab Status: In process Specimen: Blood from Arm, Right Updated: 01/11/21 1328    Blood culture #1 [[id number]] Collected: 01/11/21 1323    Lab Status: In process Specimen: Blood from Hand, Right Updated: 01/11/21 1328                 CT head without contrast   Final Result by Clayton Bledsoe MD (01/11 1716)      Normal CT of the head  Workstation performed: [id number]         CT chest without contrast   Final Result by Davida Harris MD (01/11 1511)      No pneumonia  Bibasilar atelectasis versus scarring        Stable hepatomegaly with fatty infiltration  Workstation performed: [id number]                    Procedures  Procedures         ED Course  ED Course as of Jan 11 2051   Mon Jan 11, 2021   1350 ECG sinus tachycardia 109 bpm  Normal axis   QRS 88 Qtc 490  T wave flattening aVL/III/V3  No Q waves  Poor R wave progression  Interpreted by me  1403 1  WBC wnl   2  Hg/Hct wnl   3  Plt wnl   4  Electrolytes: hypokalemia  Mild elevation in ast/alt slightly improved from prior  5  INR wnl; PTT wnl  6  Lactic acid elevated; will recheck after IVF bolus  7  BNP wnl  8  Lipase wnl  9  VBG: acute respiratory alkalosis with metabolic compensation        1429 CT completed and awaiting interpretation      1533 IMPRESSION:     No pneumonia  Bibasilar atelectasis versus scarring      Stable hepatomegaly with fatty infiltration  CT chest without contrast   1556 Repeat lactic acid normalized  Pending CT head  1649 Updated patient regarding results of workup thus far and plan for CT head  1659 CT completed and awaiting interpretation      1721 No acute intracranial abnormality   CT head without contrast   1804 UA: 2+ protein; otherwise wnl     All results were noted and discussed with the patient at length  He continues to have infectious syndrome without any obvious focus and despite adequate antibiotic coverage in December  He otherwise has markers suggestive of dehydration/hypovolemia (hypokalemia, elevated lactic acid, concentrated urinalysis)  He needs primary physician follow-up for definitive evaluation of his sx and management of other health problems; ambulatory referral to [facility] was placed in [facility]  As there is at least a moderately high suspicion of occult infectious process, levofloxacin 750 mg x5d was ordered for patient  Metoclopramide for n/v ordered  ED return for syncope, chest pain, dyspnea, inability to eat/drink  All questions were answered prior to discharge   The patient expressed understanding and agreed to plan  MDM    Disposition  Final diagnoses:   Hypokalemia   Dehydration   Systemic illness     Time reflects when diagnosis was documented in both MDM as applicable and the Disposition within this note     Time User Action Codes Description Comment    1/11/2021  6:26 PM Noreene Claw Add [B34 9] Systemic viral illness     1/11/2021  6:26 PM Noreene Claw Add [E87 6] Hypokalemia     1/11/2021  6:26 PM Noreene Claw Add [E86 0] Dehydration     1/11/2021  6:26 PM Karson Fischer 1521 [E87 6] Hypokalemia     1/11/2021  6:26 PM Noreene Claw Remove [B34 9] Systemic viral illness     1/11/2021  6:27 PM Noreene Claw Add [B34 9] Systemic viral illness     1/11/2021  6:27 PM Noreene Claw Modify [B34 9] Systemic illness       ED Disposition     ED Disposition Condition Date/Time Comment    Discharge Stable Mon Jan 11, 2021  6:26 PM Bella Dietz discharge to home/self care              Follow-up Information     Follow up With Specialties Details Why Contact Info Additional Information    [employer]  Call in 1 day To obtain primary care follow-up [address] [facility] Emergency Department Emergency Medicine Go to  If symptoms worsen: if you pass out, develop severe chest pain, are unable to eat/drink, or develop severe shortness of breath Dalton Anguiano [facility] [place] ED, [address], [address]          Discharge Medication List as of 1/11/2021  6:36 PM      START taking these medications    Details   levofloxacin (LEVAQUIN) 750 mg tablet Take 1 tablet (750 mg total) by mouth every 24 hours for 5 days, Starting Mon 1/11/2021, Until Sat 1/16/2021, Normal      metoclopramide (REGLAN) 10 mg tablet Take 1 tablet (10 mg total) by mouth every 6 (six) hours as needed (vomiting), Starting Mon 1/11/2021, Normal      potassium chloride (K-DUR,KLOR-CON) 20 mEq tablet Take 1 tablet (20 mEq total) by mouth 2 (two) times a day for 3 days, Starting Mon 1/11/2021, Until Thu 1/14/2021, Normal         CONTINUE these medications which have NOT CHANGED    Details   !! albuterol (PROVENTIL HFA,VENTOLIN HFA) 90 mcg/act inhaler Inhale 2 puffs 4 (four) times a day, Starting Fri 11/20/2020, Normal      !! albuterol (PROVENTIL HFA,VENTOLIN HFA) 90 mcg/act inhaler Inhale 2 puffs every 4 (four) hours as needed for wheezing, Starting Sun 12/27/2020, Normal      bisoprolol (ZEBETA) 5 mg tablet Take 5 mg by mouth daily, Historical Med      ergocalciferol (VITAMIN D2) 50,000 units Take 1 capsule by mouth once a week for 8 doses, Starting Thu 12/7/2017, Until Fri 1/26/2018, Normal      lamoTRIgine (LaMICtal) 25 mg tablet Take 1 tablet (25 mg total) by mouth daily, Starting Thu 11/28/2019, Until Sat 12/28/2019, Print      lisinopril (ZESTRIL) 10 mg tablet Take 10 mg by mouth daily, Historical Med       !! - Potential duplicate medications found  Please discuss with provider              PDMP Review       Value Time User    PDMP Reviewed  Yes 11/27/2019 11:46 AM Luise Krabbe, CRNP          ED Provider  Electronically Signed by           Nevaeh Wheatley,   01/11/21 [address] [place] [address], DO  01/11/21 2829

## 2021-01-12 LAB
ATRIAL RATE: 109 BPM
P AXIS: 48 DEGREES
PR INTERVAL: 136 MS
QRS AXIS: -20 DEGREES
QRSD INTERVAL: 88 MS
QT INTERVAL: 364 MS
QTC INTERVAL: 490 MS
T WAVE AXIS: 46 DEGREES
VENTRICULAR RATE: 109 BPM

## 2021-01-12 PROCEDURE — 93010 ELECTROCARDIOGRAM REPORT: CPT | Performed by: INTERNAL MEDICINE

## 2021-01-16 ENCOUNTER — APPOINTMENT (EMERGENCY)
Dept: CT IMAGING | Facility: HOSPITAL | Age: 46
DRG: 241 | End: 2021-01-16
Payer: COMMERCIAL

## 2021-01-16 ENCOUNTER — HOSPITAL ENCOUNTER (INPATIENT)
Facility: HOSPITAL | Age: 46
LOS: 3 days | Discharge: HOME | DRG: 241 | End: 2021-01-19
Attending: EMERGENCY MEDICINE | Admitting: FAMILY MEDICINE
Payer: COMMERCIAL

## 2021-01-16 DIAGNOSIS — R19.7 NAUSEA VOMITING AND DIARRHEA: ICD-10-CM

## 2021-01-16 DIAGNOSIS — I10 HYPERTENSION: ICD-10-CM

## 2021-01-16 DIAGNOSIS — R11.2 NAUSEA VOMITING AND DIARRHEA: ICD-10-CM

## 2021-01-16 DIAGNOSIS — R79.89 ELEVATED LACTIC ACID LEVEL: ICD-10-CM

## 2021-01-16 DIAGNOSIS — R79.89 ELEVATED LFTS: ICD-10-CM

## 2021-01-16 DIAGNOSIS — R51.9 INTRACTABLE HEADACHE, UNSPECIFIED CHRONICITY PATTERN, UNSPECIFIED HEADACHE TYPE: ICD-10-CM

## 2021-01-16 DIAGNOSIS — I10 ESSENTIAL HYPERTENSION: ICD-10-CM

## 2021-01-16 DIAGNOSIS — J18.9 PNEUMONIA OF BOTH LOWER LOBES DUE TO INFECTIOUS ORGANISM: ICD-10-CM

## 2021-01-16 DIAGNOSIS — Z72.0 TOBACCO ABUSE: ICD-10-CM

## 2021-01-16 DIAGNOSIS — R53.1 GENERALIZED WEAKNESS: Primary | ICD-10-CM

## 2021-01-16 DIAGNOSIS — E83.42 HYPOMAGNESEMIA: ICD-10-CM

## 2021-01-16 DIAGNOSIS — R11.2 NAUSEA AND VOMITING, INTRACTABILITY OF VOMITING NOT SPECIFIED, UNSPECIFIED VOMITING TYPE: ICD-10-CM

## 2021-01-16 DIAGNOSIS — E87.6 HYPOKALEMIA: ICD-10-CM

## 2021-01-16 DIAGNOSIS — R00.0 SINUS TACHYCARDIA: ICD-10-CM

## 2021-01-16 PROBLEM — R65.10 SIRS (SYSTEMIC INFLAMMATORY RESPONSE SYNDROME) (HCC): Status: ACTIVE | Noted: 2021-01-16

## 2021-01-16 PROBLEM — F10.10 ALCOHOL ABUSE: Status: ACTIVE | Noted: 2021-01-16

## 2021-01-16 LAB
ALBUMIN SERPL BCP-MCNC: 3.4 G/DL (ref 3.5–5)
ALP SERPL-CCNC: 101 U/L (ref 46–116)
ALT SERPL W P-5'-P-CCNC: 143 U/L (ref 12–78)
ANION GAP SERPL CALCULATED.3IONS-SCNC: 13 MMOL/L (ref 4–13)
APTT PPP: 25 SECONDS (ref 23–37)
AST SERPL W P-5'-P-CCNC: 157 U/L (ref 5–45)
BACTERIA BLD CULT: NORMAL
BACTERIA BLD CULT: NORMAL
BACTERIA UR QL AUTO: NORMAL /HPF
BASOPHILS # BLD AUTO: 0.07 THOUSANDS/ΜL (ref 0–0.1)
BASOPHILS NFR BLD AUTO: 1 % (ref 0–1)
BILIRUB SERPL-MCNC: 1.4 MG/DL (ref 0.2–1)
BILIRUB UR QL STRIP: NEGATIVE
BUN SERPL-MCNC: 6 MG/DL (ref 5–25)
CALCIUM ALBUM COR SERPL-MCNC: 9.4 MG/DL (ref 8.3–10.1)
CALCIUM SERPL-MCNC: 8.9 MG/DL (ref 8.3–10.1)
CHLORIDE SERPL-SCNC: 95 MMOL/L (ref 100–108)
CLARITY UR: CLEAR
CO2 SERPL-SCNC: 25 MMOL/L (ref 21–32)
COLOR UR: YELLOW
CREAT SERPL-MCNC: 0.93 MG/DL (ref 0.6–1.3)
CRP SERPL QL: 2.6 MG/L
EOSINOPHIL # BLD AUTO: 0.08 THOUSAND/ΜL (ref 0–0.61)
EOSINOPHIL NFR BLD AUTO: 1 % (ref 0–6)
ERYTHROCYTE [DISTWIDTH] IN BLOOD BY AUTOMATED COUNT: 14.3 % (ref 11.6–15.1)
ERYTHROCYTE [SEDIMENTATION RATE] IN BLOOD: 32 MM/HOUR (ref 0–14)
FLUAV RNA RESP QL NAA+PROBE: NEGATIVE
FLUBV RNA RESP QL NAA+PROBE: NEGATIVE
GFR SERPL CREATININE-BSD FRML MDRD: 98 ML/MIN/1.73SQ M
GLUCOSE SERPL-MCNC: 146 MG/DL (ref 65–140)
GLUCOSE UR STRIP-MCNC: NEGATIVE MG/DL
HCT VFR BLD AUTO: 47 % (ref 36.5–49.3)
HGB BLD-MCNC: 16.7 G/DL (ref 12–17)
HGB UR QL STRIP.AUTO: ABNORMAL
HOLD SPECIMEN: NORMAL
IMM GRANULOCYTES # BLD AUTO: 0.04 THOUSAND/UL (ref 0–0.2)
IMM GRANULOCYTES NFR BLD AUTO: 1 % (ref 0–2)
INR PPP: 1.02 (ref 0.84–1.19)
KETONES UR STRIP-MCNC: NEGATIVE MG/DL
LACTATE SERPL-SCNC: 2.9 MMOL/L (ref 0.5–2)
LACTATE SERPL-SCNC: 3.2 MMOL/L (ref 0.5–2)
LEUKOCYTE ESTERASE UR QL STRIP: NEGATIVE
LIPASE SERPL-CCNC: 253 U/L (ref 73–393)
LYMPHOCYTES # BLD AUTO: 1.58 THOUSANDS/ΜL (ref 0.6–4.47)
LYMPHOCYTES NFR BLD AUTO: 20 % (ref 14–44)
MAGNESIUM SERPL-MCNC: 1.5 MG/DL (ref 1.6–2.6)
MCH RBC QN AUTO: 35.8 PG (ref 26.8–34.3)
MCHC RBC AUTO-ENTMCNC: 35.5 G/DL (ref 31.4–37.4)
MCV RBC AUTO: 101 FL (ref 82–98)
MONOCYTES # BLD AUTO: 0.7 THOUSAND/ΜL (ref 0.17–1.22)
MONOCYTES NFR BLD AUTO: 9 % (ref 4–12)
NEUTROPHILS # BLD AUTO: 5.29 THOUSANDS/ΜL (ref 1.85–7.62)
NEUTS SEG NFR BLD AUTO: 68 % (ref 43–75)
NITRITE UR QL STRIP: NEGATIVE
NON-SQ EPI CELLS URNS QL MICRO: NORMAL /HPF
NRBC BLD AUTO-RTO: 0 /100 WBCS
PH UR STRIP.AUTO: 7.5 [PH]
PLATELET # BLD AUTO: 226 THOUSANDS/UL (ref 149–390)
PMV BLD AUTO: 9.5 FL (ref 8.9–12.7)
POTASSIUM SERPL-SCNC: 3.2 MMOL/L (ref 3.5–5.3)
PROCALCITONIN SERPL-MCNC: 1.27 NG/ML
PROT SERPL-MCNC: 8.1 G/DL (ref 6.4–8.2)
PROT UR STRIP-MCNC: NEGATIVE MG/DL
PROTHROMBIN TIME: 13.2 SECONDS (ref 11.6–14.5)
RBC # BLD AUTO: 4.66 MILLION/UL (ref 3.88–5.62)
RBC #/AREA URNS AUTO: NORMAL /HPF
RSV RNA RESP QL NAA+PROBE: NEGATIVE
SARS-COV-2 RNA RESP QL NAA+PROBE: NEGATIVE
SODIUM SERPL-SCNC: 133 MMOL/L (ref 136–145)
SP GR UR STRIP.AUTO: 1 (ref 1–1.03)
TROPONIN I SERPL-MCNC: <0.02 NG/ML
TSH SERPL DL<=0.05 MIU/L-ACNC: 1.44 UIU/ML (ref 0.36–3.74)
UROBILINOGEN UR QL STRIP.AUTO: 0.2 E.U./DL
WBC # BLD AUTO: 7.76 THOUSAND/UL (ref 4.31–10.16)
WBC #/AREA URNS AUTO: NORMAL /HPF

## 2021-01-16 PROCEDURE — 84145 PROCALCITONIN (PCT): CPT | Performed by: PHYSICIAN ASSISTANT

## 2021-01-16 PROCEDURE — 93005 ELECTROCARDIOGRAM TRACING: CPT

## 2021-01-16 PROCEDURE — 83735 ASSAY OF MAGNESIUM: CPT | Performed by: PHYSICIAN ASSISTANT

## 2021-01-16 PROCEDURE — 86140 C-REACTIVE PROTEIN: CPT | Performed by: PHYSICIAN ASSISTANT

## 2021-01-16 PROCEDURE — 96361 HYDRATE IV INFUSION ADD-ON: CPT

## 2021-01-16 PROCEDURE — 86617 LYME DISEASE ANTIBODY: CPT | Performed by: PHYSICIAN ASSISTANT

## 2021-01-16 PROCEDURE — 86618 LYME DISEASE ANTIBODY: CPT | Performed by: PHYSICIAN ASSISTANT

## 2021-01-16 PROCEDURE — 87486 CHLMYD PNEUM DNA AMP PROBE: CPT | Performed by: FAMILY MEDICINE

## 2021-01-16 PROCEDURE — 87633 RESP VIRUS 12-25 TARGETS: CPT | Performed by: FAMILY MEDICINE

## 2021-01-16 PROCEDURE — 0241U HB NFCT DS VIR RESP RNA 4 TRGT: CPT | Performed by: PHYSICIAN ASSISTANT

## 2021-01-16 PROCEDURE — 87040 BLOOD CULTURE FOR BACTERIA: CPT | Performed by: PHYSICIAN ASSISTANT

## 2021-01-16 PROCEDURE — G1004 CDSM NDSC: HCPCS

## 2021-01-16 PROCEDURE — 85610 PROTHROMBIN TIME: CPT | Performed by: PHYSICIAN ASSISTANT

## 2021-01-16 PROCEDURE — 96374 THER/PROPH/DIAG INJ IV PUSH: CPT

## 2021-01-16 PROCEDURE — 36415 COLL VENOUS BLD VENIPUNCTURE: CPT | Performed by: EMERGENCY MEDICINE

## 2021-01-16 PROCEDURE — 87581 M.PNEUMON DNA AMP PROBE: CPT | Performed by: FAMILY MEDICINE

## 2021-01-16 PROCEDURE — 87389 HIV-1 AG W/HIV-1&-2 AB AG IA: CPT | Performed by: FAMILY MEDICINE

## 2021-01-16 PROCEDURE — 99285 EMERGENCY DEPT VISIT HI MDM: CPT

## 2021-01-16 PROCEDURE — 84484 ASSAY OF TROPONIN QUANT: CPT | Performed by: EMERGENCY MEDICINE

## 2021-01-16 PROCEDURE — 86645 CMV ANTIBODY IGM: CPT | Performed by: FAMILY MEDICINE

## 2021-01-16 PROCEDURE — 83690 ASSAY OF LIPASE: CPT | Performed by: PHYSICIAN ASSISTANT

## 2021-01-16 PROCEDURE — 85652 RBC SED RATE AUTOMATED: CPT | Performed by: PHYSICIAN ASSISTANT

## 2021-01-16 PROCEDURE — 81001 URINALYSIS AUTO W/SCOPE: CPT | Performed by: PHYSICIAN ASSISTANT

## 2021-01-16 PROCEDURE — 96375 TX/PRO/DX INJ NEW DRUG ADDON: CPT

## 2021-01-16 PROCEDURE — 99285 EMERGENCY DEPT VISIT HI MDM: CPT | Performed by: PHYSICIAN ASSISTANT

## 2021-01-16 PROCEDURE — 96365 THER/PROPH/DIAG IV INF INIT: CPT

## 2021-01-16 PROCEDURE — 80053 COMPREHEN METABOLIC PANEL: CPT | Performed by: EMERGENCY MEDICINE

## 2021-01-16 PROCEDURE — 86753 PROTOZOA ANTIBODY NOS: CPT | Performed by: PHYSICIAN ASSISTANT

## 2021-01-16 PROCEDURE — 86738 MYCOPLASMA ANTIBODY: CPT | Performed by: FAMILY MEDICINE

## 2021-01-16 PROCEDURE — 86663 EPSTEIN-BARR ANTIBODY: CPT | Performed by: FAMILY MEDICINE

## 2021-01-16 PROCEDURE — 85730 THROMBOPLASTIN TIME PARTIAL: CPT | Performed by: PHYSICIAN ASSISTANT

## 2021-01-16 PROCEDURE — 84443 ASSAY THYROID STIM HORMONE: CPT | Performed by: PHYSICIAN ASSISTANT

## 2021-01-16 PROCEDURE — 86664 EPSTEIN-BARR NUCLEAR ANTIGEN: CPT | Performed by: FAMILY MEDICINE

## 2021-01-16 PROCEDURE — 87798 DETECT AGENT NOS DNA AMP: CPT | Performed by: FAMILY MEDICINE

## 2021-01-16 PROCEDURE — C9113 INJ PANTOPRAZOLE SODIUM, VIA: HCPCS | Performed by: FAMILY MEDICINE

## 2021-01-16 PROCEDURE — 99223 1ST HOSP IP/OBS HIGH 75: CPT | Performed by: FAMILY MEDICINE

## 2021-01-16 PROCEDURE — 86644 CMV ANTIBODY: CPT | Performed by: FAMILY MEDICINE

## 2021-01-16 PROCEDURE — 86665 EPSTEIN-BARR CAPSID VCA: CPT | Performed by: FAMILY MEDICINE

## 2021-01-16 PROCEDURE — 74177 CT ABD & PELVIS W/CONTRAST: CPT

## 2021-01-16 PROCEDURE — 71275 CT ANGIOGRAPHY CHEST: CPT

## 2021-01-16 PROCEDURE — 80074 ACUTE HEPATITIS PANEL: CPT | Performed by: PHYSICIAN ASSISTANT

## 2021-01-16 PROCEDURE — 85025 COMPLETE CBC W/AUTO DIFF WBC: CPT | Performed by: EMERGENCY MEDICINE

## 2021-01-16 PROCEDURE — 83605 ASSAY OF LACTIC ACID: CPT | Performed by: PHYSICIAN ASSISTANT

## 2021-01-16 RX ORDER — MAGNESIUM SULFATE 1 G/100ML
1 INJECTION INTRAVENOUS ONCE
Status: COMPLETED | OUTPATIENT
Start: 2021-01-16 | End: 2021-01-16

## 2021-01-16 RX ORDER — LISINOPRIL 10 MG/1
10 TABLET ORAL DAILY
Status: DISCONTINUED | OUTPATIENT
Start: 2021-01-17 | End: 2021-01-17

## 2021-01-16 RX ORDER — NICOTINE 21 MG/24HR
1 PATCH, TRANSDERMAL 24 HOURS TRANSDERMAL DAILY
Status: DISCONTINUED | OUTPATIENT
Start: 2021-01-17 | End: 2021-01-19 | Stop reason: HOSPADM

## 2021-01-16 RX ORDER — LACTOBACILLUS ACIDOPHILUS / LACTOBACILLUS BULGARICUS 100 MILLION CFU STRENGTH
1 GRANULES ORAL
Status: DISCONTINUED | OUTPATIENT
Start: 2021-01-16 | End: 2021-01-19 | Stop reason: HOSPADM

## 2021-01-16 RX ORDER — SODIUM CHLORIDE, SODIUM LACTATE, POTASSIUM CHLORIDE, CALCIUM CHLORIDE 600; 310; 30; 20 MG/100ML; MG/100ML; MG/100ML; MG/100ML
75 INJECTION, SOLUTION INTRAVENOUS CONTINUOUS
Status: DISCONTINUED | OUTPATIENT
Start: 2021-01-16 | End: 2021-01-18

## 2021-01-16 RX ORDER — ONDANSETRON 2 MG/ML
4 INJECTION INTRAMUSCULAR; INTRAVENOUS EVERY 4 HOURS PRN
Status: DISCONTINUED | OUTPATIENT
Start: 2021-01-16 | End: 2021-01-19 | Stop reason: HOSPADM

## 2021-01-16 RX ORDER — POTASSIUM CHLORIDE 14.9 MG/ML
20 INJECTION INTRAVENOUS ONCE
Status: COMPLETED | OUTPATIENT
Start: 2021-01-16 | End: 2021-01-16

## 2021-01-16 RX ORDER — BISOPROLOL FUMARATE 5 MG/1
5 TABLET ORAL DAILY
Status: DISCONTINUED | OUTPATIENT
Start: 2021-01-17 | End: 2021-01-19 | Stop reason: HOSPADM

## 2021-01-16 RX ORDER — PANTOPRAZOLE SODIUM 40 MG/1
40 INJECTION, POWDER, FOR SOLUTION INTRAVENOUS EVERY 12 HOURS SCHEDULED
Status: DISCONTINUED | OUTPATIENT
Start: 2021-01-16 | End: 2021-01-19 | Stop reason: HOSPADM

## 2021-01-16 RX ORDER — METOCLOPRAMIDE HYDROCHLORIDE 5 MG/ML
10 INJECTION INTRAMUSCULAR; INTRAVENOUS ONCE
Status: COMPLETED | OUTPATIENT
Start: 2021-01-16 | End: 2021-01-16

## 2021-01-16 RX ORDER — CHLORDIAZEPOXIDE HYDROCHLORIDE 25 MG/1
25 CAPSULE, GELATIN COATED ORAL EVERY 6 HOURS SCHEDULED
Status: COMPLETED | OUTPATIENT
Start: 2021-01-16 | End: 2021-01-18

## 2021-01-16 RX ADMIN — SODIUM CHLORIDE 1000 ML: 0.9 INJECTION, SOLUTION INTRAVENOUS at 15:31

## 2021-01-16 RX ADMIN — SODIUM CHLORIDE 1000 ML: 0.9 INJECTION, SOLUTION INTRAVENOUS at 14:55

## 2021-01-16 RX ADMIN — SODIUM CHLORIDE, SODIUM LACTATE, POTASSIUM CHLORIDE, AND CALCIUM CHLORIDE 125 ML/HR: .6; .31; .03; .02 INJECTION, SOLUTION INTRAVENOUS at 20:02

## 2021-01-16 RX ADMIN — MAGNESIUM SULFATE HEPTAHYDRATE 1 G: 1 INJECTION, SOLUTION INTRAVENOUS at 16:43

## 2021-01-16 RX ADMIN — PANTOPRAZOLE SODIUM 40 MG: 40 INJECTION, POWDER, LYOPHILIZED, FOR SOLUTION INTRAVENOUS at 20:01

## 2021-01-16 RX ADMIN — POTASSIUM CHLORIDE 20 MEQ: 14.9 INJECTION, SOLUTION INTRAVENOUS at 15:33

## 2021-01-16 RX ADMIN — CHLORDIAZEPOXIDE HYDROCHLORIDE 25 MG: 25 CAPSULE ORAL at 19:03

## 2021-01-16 RX ADMIN — LACTOBACILLUS ACIDOPHILUS / LACTOBACILLUS BULGARICUS 1 PACKET: 100 MILLION CFU STRENGTH GRANULES at 19:03

## 2021-01-16 RX ADMIN — METOCLOPRAMIDE HYDROCHLORIDE 10 MG: 5 INJECTION INTRAMUSCULAR; INTRAVENOUS at 15:32

## 2021-01-16 RX ADMIN — IOHEXOL 100 ML: 350 INJECTION, SOLUTION INTRAVENOUS at 15:07

## 2021-01-16 RX ADMIN — MAGNESIUM OXIDE TAB 400 MG (241.3 MG ELEMENTAL MG) 400 MG: 400 (241.3 MG) TAB at 19:05

## 2021-01-16 NOTE — ASSESSMENT & PLAN NOTE
On admission met SIRS criteria with HR and RR  Sirs present on admission, likely secondary to volume depletion, possible alcohol induced gastritis    No evidence of specific infection    SIRS Resolved

## 2021-01-16 NOTE — H&P
H&P Exam - Anton Lunch 54 y o  male MRN: [id number]    Unit/Bed#: DIPESH Encounter: [id number]    Nausea and vomiting  Assessment & Plan  Again likely related to ETOH and likely underlying gastritis   Associated with diarrhea that began around september as well  Start protonix IV BID  Check Stool Studies (C diff, Enteric panel, Fecal Leucocytes, Ova and parasites)   H  Pylori stool antigen  Clear liquids  Zofran     SIRS (systemic inflammatory response syndrome) (HCC)  Assessment & Plan  HR and RR along with lactic acidosis   Unclear if this due to alcohol use vs  Underlying infection  Blood cultures obtained and extensive Infectious work up outlined below    Alcohol abuse  Assessment & Plan  Patient states he began heavily drinking around September of 2020 to combat headaches  He consumes about 1/5 of whiskey, sometimes more daily   Start Librium 25mg PO q6 x 2 days then q8 x 2 days then q12 x 2 days   CIWA protocol  Multivitamin daily       Elevated liver function tests  Assessment & Plan  Likely due to alcohol abuse, however given fevers / headaches will pursuse infectious etiology  Hepatitis panel / EBV / CMV / Babesiosis / Lyme Panel / procalcitonin   ID consult on Monday when available    Essential hypertension  Assessment & Plan  Patient states he has not been taking his lisinopril or Alberto Ayush  Will resume both monitor blood pressure closely        History of Present Illness      The patient is a pleasant 13-year-old male with a past medical history significant for essential hypertension who presents to the emergency room today with complaints of generalized fatigue and symptoms that began in September  He states his symptoms began as a headache and subsequently progressed to nausea with dry heaving  He was then seen December of 2020, diagnosed with pneumonia prescribed azithromycin and Vantin    When his symptoms fail to improve he returned 2 weeks later and was prescribed Levaquin and was noted to have an elevated procalcitonin of 11  He completed the course of antibiotics without improvement of his symptoms  He states he began heavy drinking around September to deal with his headaches  He consumes sometimes a 5th of whiskey, sometimes more depending on how bad his headaches are  He denies any  high sat    The patient states he is an avid [employer], but denies removing any ticks in denies noting any rashes      Review of Systems   Constitutional: Positive for appetite change, chills, fatigue and fever  Cardiovascular: Negative for chest pain and leg swelling  Gastrointestinal: Positive for diarrhea, nausea and vomiting  Genitourinary: Negative  Musculoskeletal: Negative for arthralgias and joint swelling  Neurological: Positive for headaches  All other systems reviewed and are negative  Historical Information   Past Medical History:   Diagnosis Date    Biceps tendon tear     Carpal tunnel syndrome     Chronic pain     back    Hypertension      Past Surgical History:   Procedure Laterality Date    CARPAL TUNNEL RELEASE      ROTATOR CUFF REPAIR      ULNAR NERVE REPAIR       Social History   Social History     Substance and Sexual Activity   Alcohol Use Yes    Alcohol/week: 0 0 standard drinks    Frequency: 4 or more times a week    Drinks per session: 3 or 4    Binge frequency: Daily or almost daily     Social History     Substance and Sexual Activity   Drug Use Not Currently    Types: Oxycodone, Benzodiazepines    Comment: pt admits to use of oxycodone and xanax       Social History     Tobacco Use   Smoking Status Current Every Day Smoker    Packs/day: 1 00    Years: 25 00    Pack years: 25 00   Smokeless Tobacco Never Used     E-Cigarette Use: Never User     E-Cigarette/Vaping Substances       Family History: non-contributory    Meds/Allergies   all medications and allergies reviewed  No Known Allergies    Objective   First Vitals:   Blood Pressure: (!) 174/110 (01/16/21 1421)  Pulse: (!) 128 (01/16/21 1421)  Temperature: 97 5 °F (36 4 °C) (01/16/21 1421)  Temp Source: Temporal (01/16/21 1421)  Respirations: 20 (01/16/21 1421)  Height: 5' 11" (180 3 cm) (01/16/21 1421)  Weight - Scale: 105 kg (230 lb 13 2 oz) (01/16/21 1421)  SpO2: 96 % (01/16/21 1421)    Current Vitals:   Blood Pressure: (!) 172/100 (01/16/21 1645)  Pulse: (!) 116 (01/16/21 1645)  Temperature: 97 5 °F (36 4 °C) (01/16/21 1421)  Temp Source: Temporal (01/16/21 1421)  Respirations: 20 (01/16/21 1645)  Height: 5' 11" (180 3 cm) (01/16/21 1421)  Weight - Scale: 105 kg (230 lb 13 2 oz) (01/16/21 1421)  SpO2: 97 % (01/16/21 1645)      Intake/Output Summary (Last 24 hours) at 1/16/2021 1712  Last data filed at 1/16/2021 1531  Gross per 24 hour   Intake 1000 ml   Output    Net 1000 ml       Invasive Devices     Peripheral Intravenous Line            Peripheral IV 01/16/21 Left Antecubital less than 1 day    Peripheral IV 01/16/21 Right Forearm less than 1 day                Physical Exam  Constitutional:       Appearance: Normal appearance  HENT:      Head: Normocephalic and atraumatic  Right Ear: There is impacted cerumen  Nose: Nose normal       Mouth/Throat:      Pharynx: No oropharyngeal exudate  Eyes:      Pupils: Pupils are equal, round, and reactive to light  Neck:      Musculoskeletal: Normal range of motion  No neck rigidity  Cardiovascular:      Rate and Rhythm: Regular rhythm  Tachycardia present  Pulmonary:      Effort: Pulmonary effort is normal  No respiratory distress  Breath sounds: Normal breath sounds  Abdominal:      General: Abdomen is flat  Palpations: Abdomen is soft  Tenderness: There is abdominal tenderness  Musculoskeletal: Normal range of motion  General: No swelling  Skin:     General: Skin is warm  Capillary Refill: Capillary refill takes 2 to 3 seconds  Coloration: Skin is not jaundiced     Neurological:      General: No focal deficit present  Mental Status: He is alert and oriented to person, place, and time  Lab Results:   Lab Results   Component Value Date    WBC 7 76 01/16/2021    HGB 16 7 01/16/2021    HCT 47 0 01/16/2021     (H) 01/16/2021     01/16/2021     Lab Results   Component Value Date     11/04/2015    SODIUM 133 (L) 01/16/2021    K 3 2 (L) 01/16/2021    CL 95 (L) 01/16/2021    CO2 25 01/16/2021    ANIONGAP 6 11/04/2015    AGAP 13 01/16/2021    BUN 6 01/16/2021    CREATININE 0 93 01/16/2021    GLUC 146 (H) 01/16/2021    GLUF 98 11/26/2019    CALCIUM 8 9 01/16/2021     (H) 01/16/2021     (H) 01/16/2021    ALKPHOS 101 01/16/2021    PROT 7 3 11/04/2015    TP 8 1 01/16/2021    BILITOT 0 82 11/04/2015    TBILI 1 40 (H) 01/16/2021    EGFR 98 01/16/2021       Imaging:   CT pe study w abdomen pelvis w contrast   Final Result         1  No evidence of acute pulmonary embolus, thoracic aortic aneurysm or dissection  No acute cardiopulmonary process  2   No evidence of acute intra-abdominal or pelvic process                       Workstation performed: [id number]             EKG, Pathology, and Other Studies: Sinus Tach    Code Status: Prior  Advance Directive and Living Will:      Power of :    POLST:      Counseling / Coordination of Care:

## 2021-01-16 NOTE — ED PROVIDER NOTES
History  Chief Complaint   Patient presents with    Weakness - Generalized     pt states he has had generalized weakness and n/v/d for two weeks, was seen and evaluated for the same in this ED twice, was diagnosed with pneumonia and placed on antibiotics with no relief     54year old male presents ambulatory from home for evaluation of continued weakness  Pt notes he hasn't felt well and has been dealing with symptoms since Thanksgiving  He reports he was seen here on 2 prior occasions  He took antibiotics without relief  He notes each time he's been feeling worse  He reports generalized fatigue  He notes decreased appetite  Denies fevers but reports chills  Denies chest pain  Denies SOB  He notes he's been dealing with a cough  He reports around [place] he was coughing up blood  He was seen here at the end of December and diagnosed with pneumonia  He had negative covid testing on several occasions  He was treated with zithromax and cefpodoxime  He notes cough was getting better  Was seen again here on 1/11/21 for symptoms and was subsequently discharged on levaquin  He completed this with no reported change in symptoms  He feels symptoms are getting worse  He reports continued nausea, vomiting and diarrhea  He denies abdominal pain but notes her abdomen is sore from throwing up  He reports headaches and generalized body aches  Denies any urinary symptoms such as dysuria, frequency, urgency  No rashes reported  Denies recent travel  Denies sick contacts  History provided by:  Patient and medical records   used: No        Prior to Admission Medications   Prescriptions Last Dose Informant Patient Reported? Taking?    albuterol (PROVENTIL HFA,VENTOLIN HFA) 90 mcg/act inhaler Not Taking at Unknown time  No No   Sig: Inhale 2 puffs 4 (four) times a day   Patient not taking: Reported on 1/16/2021   albuterol (PROVENTIL HFA,VENTOLIN HFA) 90 mcg/act inhaler Not Taking at Unknown time  No No   Sig: Inhale 2 puffs every 4 (four) hours as needed for wheezing   Patient not taking: Reported on 1/16/2021   bisoprolol (ZEBETA) 5 mg tablet Not Taking at Unknown time  Yes No   Sig: Take 5 mg by mouth daily   ergocalciferol (VITAMIN D2) 50,000 units   No No   Sig: Take 1 capsule by mouth once a week for 8 doses   levofloxacin (LEVAQUIN) 750 mg tablet 1/16/2021 at Unknown time  No Yes   Sig: Take 1 tablet (750 mg total) by mouth every 24 hours for 5 days   lisinopril (ZESTRIL) 10 mg tablet Not Taking at Unknown time  Yes No   Sig: Take 10 mg by mouth daily      Facility-Administered Medications: None       Past Medical History:   Diagnosis Date    Biceps tendon tear     Carpal tunnel syndrome     Chronic pain     back    Hypertension        Past Surgical History:   Procedure Laterality Date    CARPAL TUNNEL RELEASE      ROTATOR CUFF REPAIR      ULNAR NERVE REPAIR         History reviewed  No pertinent family history  I have reviewed and agree with the history as documented  E-Cigarette/Vaping    E-Cigarette Use Never User      E-Cigarette/Vaping Substances     Social History     Tobacco Use    Smoking status: Current Every Day Smoker     Packs/day: 1 00     Years: 25 00     Pack years: 25 00    Smokeless tobacco: Never Used   Substance Use Topics    Alcohol use: Yes     Alcohol/week: 0 0 standard drinks     Frequency: 4 or more times a week     Drinks per session: 3 or 4     Binge frequency: Daily or almost daily    Drug use: Not Currently     Types: Oxycodone, Benzodiazepines     Comment: pt admits to use of oxycodone and xanax  Review of Systems   Constitutional: Positive for chills and fatigue  Negative for fever  HENT: Negative  Negative for congestion, rhinorrhea and sore throat  Eyes: Negative  Negative for visual disturbance  Respiratory: Positive for cough  Negative for shortness of breath and wheezing  Cardiovascular: Negative    Negative for chest pain, palpitations and leg swelling  Gastrointestinal: Positive for abdominal pain (pt notes that his abdomen feels sore from vomiting), diarrhea, nausea and vomiting  Negative for constipation  Genitourinary: Negative  Negative for dysuria, flank pain, frequency and hematuria  Musculoskeletal: Positive for back pain (chronic) and myalgias  Negative for neck pain  Skin: Negative  Negative for rash  Neurological: Positive for weakness, light-headedness and headaches  Negative for dizziness and numbness  Psychiatric/Behavioral: Negative  Negative for confusion  All other systems reviewed and are negative  Physical Exam  Physical Exam  Vitals signs and nursing note reviewed  Constitutional:       General: He is not in acute distress  Appearance: Normal appearance  He is well-developed  He is obese  He is not diaphoretic  HENT:      Head: Normocephalic and atraumatic  Right Ear: Hearing, tympanic membrane, ear canal and external ear normal       Left Ear: Hearing, tympanic membrane, ear canal and external ear normal       Nose: Nose normal       Mouth/Throat:      Pharynx: Oropharynx is clear  Uvula midline  No oropharyngeal exudate  Eyes:      General: No scleral icterus  Extraocular Movements: Extraocular movements intact  Conjunctiva/sclera: Conjunctivae normal       Pupils: Pupils are equal, round, and reactive to light  Neck:      Musculoskeletal: Normal range of motion and neck supple  Trachea: Trachea and phonation normal  No tracheal deviation  Cardiovascular:      Rate and Rhythm: Regular rhythm  Tachycardia present  Pulses: Normal pulses  Heart sounds: Normal heart sounds, S1 normal and S2 normal  No murmur  Pulmonary:      Effort: Pulmonary effort is normal  No tachypnea or respiratory distress  Breath sounds: Normal breath sounds  No wheezing, rhonchi or rales  Abdominal:      General: Bowel sounds are normal  There is no distension  Palpations: Abdomen is soft  Tenderness: There is abdominal tenderness in the epigastric area  There is no right CVA tenderness, left CVA tenderness, guarding or rebound  Hernia: No hernia is present  Musculoskeletal: Normal range of motion  General: No tenderness  Right lower leg: No edema  Left lower leg: No edema  Skin:     General: Skin is warm and dry  Capillary Refill: Capillary refill takes less than 2 seconds  Findings: No rash  Nails: There is no clubbing  Neurological:      Mental Status: He is alert and oriented to person, place, and time  Cranial Nerves: No cranial nerve deficit  Sensory: No sensory deficit  Motor: No abnormal muscle tone     Psychiatric:         Behavior: Behavior normal          Vital Signs  ED Triage Vitals [01/16/21 1421]   Temperature Pulse Respirations Blood Pressure SpO2   97 5 °F (36 4 °C) (!) 128 20 (!) 174/110 96 %      Temp Source Heart Rate Source Patient Position - Orthostatic VS BP Location FiO2 (%)   Temporal Monitor Lying Right arm --      Pain Score       7           Vitals:    01/16/21 1615 01/16/21 1645 01/16/21 1800 01/16/21 2115   BP: 165/100 (!) 172/100 (!) 165/115 (!) 169/115   Pulse: (!) 110 (!) 116 (!) 107 104   Patient Position - Orthostatic VS: Lying Sitting Lying          Visual Acuity  Visual Acuity      Most Recent Value   L Pupil Size (mm)  3   R Pupil Size (mm)  3          ED Medications  Medications   magnesium oxide (MAG-OX) tablet 400 mg (400 mg Oral Given 1/16/21 1905)   chlordiazePOXIDE (LIBRIUM) capsule 25 mg (25 mg Oral Given 1/16/21 1903)   pantoprazole (PROTONIX) injection 40 mg (40 mg Intravenous Given 1/16/21 2001)   lisinopril (ZESTRIL) tablet 10 mg (has no administration in time range)   bisoprolol (ZEBETA) tablet 5 mg (has no administration in time range)   lactated ringers infusion (125 mL/hr Intravenous New Bag 1/16/21 2002)   ondansetron (ZOFRAN) injection 4 mg (has no administration in time range)   nicotine (NICODERM CQ) 21 mg/24 hr TD 24 hr patch 1 patch (has no administration in time range)   enoxaparin (LOVENOX) subcutaneous injection 40 mg (has no administration in time range)   lactobacillus acidophilus-bulgaricus (FLORANEX) packet 1 packet (1 packet Oral Given 1/16/21 1903)   multivitamin stress formula tablet 1 tablet (has no administration in time range)   sodium chloride 0 9 % bolus 1,000 mL (0 mL Intravenous Stopped 1/16/21 1531)   iohexol (OMNIPAQUE) 350 MG/ML injection (SINGLE-DOSE) 100 mL (100 mL Intravenous Given 1/16/21 1507)   potassium chloride 20 mEq IVPB (premix) (20 mEq Intravenous New Bag 1/16/21 1533)   metoclopramide (REGLAN) injection 10 mg (10 mg Intravenous Given 1/16/21 1532)   sodium chloride 0 9 % bolus 1,000 mL (1,000 mL Intravenous New Bag 1/16/21 1531)   magnesium sulfate IVPB (premix) SOLN 1 g (1 g Intravenous New Bag 1/16/21 1643)       Diagnostic Studies  Results Reviewed     Procedure Component Value Units Date/Time    Respiratory Panel 2 (RP2) [[id number]] Collected: 01/16/21 2035    Lab Status: No result Specimen: Nasopharyngeal Swab     Blood culture #1 [[id number]] Collected: 01/16/21 1430    Lab Status: Preliminary result Specimen: Blood from Arm, Left Updated: 01/16/21 2001     Blood Culture Received in Microbiology Lab  Culture in Progress  Blood culture #2 [[id number]] Collected: 01/16/21 1431    Lab Status: Preliminary result Specimen: Blood from Arm, Right Updated: 01/16/21 2001     Blood Culture Received in Microbiology Lab  Culture in Progress      Procalcitonin with AM Reflex [[id number]]  (Abnormal) Resulted: 01/16/21 1946    Lab Status: Final result Specimen: Blood Updated: 01/16/21 1946     Procalcitonin 1 27 ng/ml     Lactic acid 2 Hours [[id number]]  (Abnormal) Collected: 01/16/21 1910    Lab Status: Final result Specimen: Blood from Arm, Right Updated: 01/16/21 1944     LACTIC ACID 3 2 mmol/L     Narrative:      Result may be elevated if tourniquet was used during collection  Mycoplasma Pneumoniae AB, IgG/IgM [[id number]] Collected: 01/16/21 1909    Lab Status: In process Specimen: Arm, Right Updated: 01/16/21 1918    CMV IgG/IgM Antibodies [[id number]] Collected: 01/16/21 1909    Lab Status: In process Specimen: Blood from Arm, Right Updated: 01/16/21 1918    Lyme Antibody Profile with reflex to [facility] [[id number]] Collected: 01/16/21 1909    Lab Status: In process Specimen: Blood from Arm, Right Updated: 01/16/21 1918    HIV 1/2 ANTIGEN/ANTIBODY (4TH GENERATION) Heath Aleman REFLEX [facility] [[id number]] Collected: 01/16/21 1909    Lab Status: In process Specimen: Blood from Arm, Right Updated: 01/16/21 1918    Babesia microti antibody, IgG & Igm [[id number]] Collected: 01/16/21 1909    Lab Status: In process Specimen: Blood from Arm, Right Updated: 01/16/21 1918    EBV acute panel [[id number]] Collected: 01/16/21 1909    Lab Status:  In process Specimen: Blood from Arm, Right Updated: 01/16/21 1918    C-reactive protein [[id number]]  (Normal) Collected: 01/16/21 1550    Lab Status: Final result Specimen: Blood Updated: 01/16/21 1811     CRP 2 6 mg/L     H  pylori antigen, stool [[id number]]     Lab Status: No result Specimen: Stool     Stool Enteric Bacterial Panel by PCR [[id number]]     Lab Status: No result Specimen: Stool     Ova and parasite examination [[id number]]     Lab Status: No result Specimen: Stool     Fecal leukocytes [[id number]]     Lab Status: No result Specimen: Stool     Clostridium difficile toxin by PCR with EIA [[id number]]     Lab Status: No result Specimen: Stool     Urine Microscopic [[id number]]  (Normal) Collected: 01/16/21 1638    Lab Status: Final result Specimen: Urine, Clean Catch Updated: 01/16/21 1649     RBC, UA 0-1 /hpf      WBC, UA None Seen /hpf      Epithelial Cells Occasional /hpf      Bacteria, UA Occasional /hpf     UA (URINE) with reflex to Scope [[id number]]  (Abnormal) Collected: 01/16/21 4660    Lab Status: Final result Specimen: Urine, Clean Catch Updated: 01/16/21 1649     Color, UA Yellow     Clarity, UA Clear     Specific [place], UA 1 005     pH, UA 7 5     Leukocytes, UA Negative     Nitrite, UA Negative     Protein, UA Negative mg/dl      Glucose, UA Negative mg/dl      Ketones, UA Negative mg/dl      Urobilinogen, UA 0 2 E U /dl      Bilirubin, UA Negative     Blood, UA Trace    Rapid drug screen, urine [[id number]]     Lab Status: No result Specimen: Urine     TSH [[id number]]  (Normal) Collected: 01/16/21 1550    Lab Status: Final result Specimen: Blood Updated: 01/16/21 1617     TSH 3RD GENERATON 1 441 uIU/mL     Narrative:      Patients undergoing fluorescein dye angiography may retain small amounts of fluorescein in the body for 48-72 hours post procedure  Samples containing fluorescein can produce falsely depressed TSH values  If the patient had this procedure,a specimen should be resubmitted post fluorescein clearance  Magnesium [[id number]]  (Abnormal) Collected: 01/16/21 1550    Lab Status: Final result Specimen: Blood Updated: 01/16/21 1617     Magnesium 1 5 mg/dL     Lipase [[id number]]  (Normal) Collected: 01/16/21 1550    Lab Status: Final result Specimen: Blood Updated: 01/16/21 1617     Lipase 253 u/L     Protime-INR [[id number]]  (Normal) Collected: 01/16/21 1550    Lab Status: Final result Specimen: Blood Updated: 01/16/21 1600     Protime 13 2 seconds      INR 1 02    APTT [[id number]]  (Normal) Collected: 01/16/21 1550    Lab Status: Final result Specimen: Blood Updated: 01/16/21 1600     PTT 25 seconds     Sedimentation rate, automated [[id number]]  (Abnormal) Collected: 01/16/21 1431    Lab Status: Final result Specimen: Blood from Arm, Left Updated: 01/16/21 1552     Sed Rate 32 mm/hour     Narrative:      New method- Test performed using  automated [employer]  If following a patient's inflammatory disease during treatment, a new baseline should be established      Hepatitis panel, acute [[id number]] Collected: 01/16/21 1551    Lab Status: In process Specimen: Blood Updated: 01/16/21 1551    Lactic acid [[id number]]  (Abnormal) Collected: 01/16/21 1431    Lab Status: Final result Specimen: Blood from Arm, Right Updated: 01/16/21 1513     LACTIC ACID 2 9 mmol/L     Narrative:      Result may be elevated if tourniquet was used during collection      Troponin I [[id number]]  (Normal) Collected: 01/16/21 1431    Lab Status: Final result Specimen: Blood from Arm, Left Updated: 01/16/21 1453     Troponin I <0 02 ng/mL     Comprehensive metabolic panel [[id number]]  (Abnormal) Collected: 01/16/21 1431    Lab Status: Final result Specimen: Blood from Arm, Left Updated: 01/16/21 1450     Sodium 133 mmol/L      Potassium 3 2 mmol/L      Chloride 95 mmol/L      CO2 25 mmol/L      ANION GAP 13 mmol/L      BUN 6 mg/dL      Creatinine 0 93 mg/dL      Glucose 146 mg/dL      Calcium 8 9 mg/dL      Corrected Calcium 9 4 mg/dL       U/L       U/L      Alkaline Phosphatase 101 U/L      Total Protein 8 1 g/dL      Albumin 3 4 g/dL      Total Bilirubin 1 40 mg/dL      eGFR 98 ml/min/1 73sq m     Narrative:      Jeremi guidelines for Chronic Kidney Disease (CKD):     Stage 1 with normal or high GFR (GFR > 90 mL/min/1 73 square meters)    Stage 2 Mild CKD (GFR = 60-89 mL/min/1 73 square meters)    Stage 3A Moderate CKD (GFR = 45-59 mL/min/1 73 square meters)    Stage 3B Moderate CKD (GFR = 30-44 mL/min/1 73 square meters)    Stage 4 Severe CKD (GFR = 15-29 mL/min/1 73 square meters)    Stage 5 End Stage CKD (GFR <15 mL/min/1 73 square meters)  Note: GFR calculation is accurate only with a steady state creatinine    CBC and differential [[id number]]  (Abnormal) Collected: 01/16/21 1431    Lab Status: Final result Specimen: Blood from Arm, Left Updated: 01/16/21 1437     WBC 7 76 Thousand/uL      RBC 4 66 Million/uL      Hemoglobin 16 7 g/dL      Hematocrit 47 0 %       fL MCH 35 8 pg      MCHC 35 5 g/dL      RDW 14 3 %      MPV 9 5 fL      Platelets 955 Thousands/uL      nRBC 0 /100 WBCs      Neutrophils Relative 68 %      Immat GRANS % 1 %      Lymphocytes Relative 20 %      Monocytes Relative 9 %      Eosinophils Relative 1 %      Basophils Relative 1 %      Neutrophils Absolute 5 29 Thousands/µL      Immature Grans Absolute 0 04 Thousand/uL      Lymphocytes Absolute 1 58 Thousands/µL      Monocytes Absolute 0 70 Thousand/µL      Eosinophils Absolute 0 08 Thousand/µL      Basophils Absolute 0 07 Thousands/µL                  CT pe study w abdomen pelvis w contrast   Final Result by Sandrita Dumont MD (01/16 8000)         1  No evidence of acute pulmonary embolus, thoracic aortic aneurysm or dissection  No acute cardiopulmonary process  2   No evidence of acute intra-abdominal or pelvic process  Workstation performed: [id number]                    Procedures  ECG 12 Lead Documentation Only    Date/Time: 1/16/2021 2:24 PM  Performed by: Pacheco Martinez PA-C  Authorized by: Pacheco Martinez PA-C     Indications / Diagnosis:  Tachycardia  ECG reviewed by me, the ED Provider: yes    Patient location:  ED  Previous ECG:     Previous ECG:  Compared to current    Comparison ECG info:  1/11/21    Similarity:  No change    Comparison to cardiac monitor: Yes    Interpretation:     Interpretation: abnormal    Rate:     ECG rate:  127    ECG rate assessment: tachycardic    Rhythm:     Rhythm: sinus tachycardia    Ectopy:     Ectopy: none    QRS:     QRS axis:  Normal    QRS intervals:  Normal  Conduction:     Conduction: normal    ST segments:     ST segments:  Non-specific  T waves:     T waves: non-specific    Comments:      , QRS 94, QT/QTc 342/497; compared to prior EKG, they are similar although today's rate has increased  ED Course  ED Course as of Jan 16 2037   Sat Jan 16, 2021   1440 Prior records reviewed    Was seen at our facility on 12/27/20 and diagnosed with pneumonia  He completed zithromax and cefpodoxime  Was again seen at our facility on 1/11/21  CT head was negative  CT chest w/o contrast showed no pneumonia  Was discharged on PO levaquin for a suspected systemic illness  Procalcitonin at that time did return elevated at 11  Will recheck some lab and urine studies, EKG and will perform CT PE study w/ abd/pelv for further evaluation  Pt is afebrile, but tachycardic and hypertensive  Will hydrate and treat symptomatically  1444 WBC: 7 76   1444 Hemoglobin: 16 7   1444 Platelet Count: 386   1452 Glucose, Random(!): 146   1452 Creatinine: 0 93   1452 BUN: 6   1452 Sodium(!): 133   1452 Will replete  Potassium(!): 3 2   1452 Chloride(!): 95   1452 CO2: 25   1452 Anion Gap: 13   1452 CORRECTED CALCIUM: 9 4   1453 179 last visit, decreased from 2 weeks ago    AST(!): 157   1453 148 last visit, decreased from 2 weeks ago  ALT(!): 143   1454 Alkaline Phosphatase: 101   1454 Total Protein: 8 1   1454 Albumin(!): 3 4   1454 TOTAL BILIRUBIN(!): 1 40   1454 eGFR: 98   1454 Troponin I: <0 02   1514 Similar elevations at prior visits; no clear infectious source  Will defer further Abx at present and plan to discuss with SLIM  LACTIC ACID(!!): 2 9   1553 Sed Rate(!): 32   1606 INR: 1 02   1606 Protime: 13 2   1606 PTT: 25   1606 CT has been performed and pending interpretation  1618 TSH 3RD GENERATON: 1 441   1618 Lipase: 253   1618 Will replete  Magnesium(!): 1 5   1623 IMPRESSION:        1  No evidence of acute pulmonary embolus, thoracic aortic aneurysm or dissection  No acute cardiopulmonary process  2   No evidence of acute intra-abdominal or pelvic process  CT pe study w abdomen pelvis w contrast   1638 Spoke to Dr Shahrzad Emery via telephone, appropriate pt information relayed  He will come down to see pt       1640 Pt again updated  Would be agreeable to admission  Nausea feels improved    Heart rate remains in the 110s       [address] Gerard Medrano down to evaluate pt       1651 Shows trace blood but otherwise negative  No evidence of UTI    UA (URINE) with reflex to Scope(!)   1655 Dr Gerard Medrano accepts for admission  Pt does admit to alcohol use which could explain some of his symptoms and the tachycardia, HTN could be related to withdrawal which he will treat with ativan  I did also discuss with attending Dr Heath Bryant who saw and evaluated pt during last ER visit  He also agrees with admission  Will admit for further observation and evaluation  Pt admitted in stable condition  HEART Risk Score      Most Recent Value   Heart Score Risk Calculator   History  0 Filed at: 01/16/2021 1454   ECG  1 Filed at: 01/16/2021 1454   Age  1 Filed at: 01/16/2021 1454   Risk Factors  1 Filed at: 01/16/2021 1454   Troponin  0 Filed at: 01/16/2021 1454   HEART Score  3 Filed at: 01/16/2021 1454                      SBIRT 20yo+      Most Recent Value   SBIRT (25 yo +)   In order to provide better care to our patients, we are screening all of our patients for alcohol and drug use  Would it be okay to ask you these screening questions? Yes Filed at: 01/16/2021 1445   Initial Alcohol Screen: US AUDIT-C    1  How often do you have a drink containing alcohol?  0 Filed at: 01/16/2021 1445   2  How many drinks containing alcohol do you have on a typical day you are drinking? 0 Filed at: 01/16/2021 1445   3a  Male UNDER 65: How often do you have five or more drinks on one occasion? 0 Filed at: 01/16/2021 1445   3b  FEMALE Any Age, or MALE 65+: How often do you have 4 or more drinks on one occassion? 0 Filed at: 01/16/2021 1445   Audit-C Score  0 Filed at: 01/16/2021 1445   KANU: How many times in the past year have you    Used an illegal drug or used a prescription medication for non-medical reasons?   Never Filed at: 01/16/2021 1445          Shakeel' Criteria for PE      Most Recent Value   Shakeel' Criteria for PE   Clinical signs and symptoms of DVT  0 Filed at: 01/16/2021 1445   PE is primary diagnosis or equally likely  3 Filed at: 01/16/2021 1445   HR >100  1 5 Filed at: 01/16/2021 1445   Immobilization at least 3 days or Surgery in the previous 4 weeks  0 Filed at: 01/16/2021 1445   Previous, objectively diagnosed PE or DVT  0 Filed at: 01/16/2021 1445   Hemoptysis  0 Filed at: 01/16/2021 1445   Malignancy with treatment within 6 months or palliative  0 Filed at: 01/16/2021 1445   Wells' Criteria Total  4 5 Filed at: 01/16/2021 1445                MDM  Number of Diagnoses or Management Options  Elevated lactic acid level: established and worsening  Elevated LFTs: established and improving  Generalized weakness: established and worsening  Hypertension: established and worsening  Hypokalemia: new and requires workup  Hypomagnesemia: new and requires workup  Nausea vomiting and diarrhea: established and worsening  Sinus tachycardia: new and requires workup     Amount and/or Complexity of Data Reviewed  Clinical lab tests: ordered and reviewed  Tests in the radiology section of CPT®: ordered and reviewed  Decide to obtain previous medical records or to obtain history from someone other than the patient: yes  Obtain history from someone other than the patient: yes  Review and summarize past medical records: yes  Discuss the patient with other providers: yes (Attending, SLIM)  Independent visualization of images, tracings, or specimens: yes    Patient Progress  Patient progress: improved      Disposition  Final diagnoses:   Generalized weakness   Nausea vomiting and diarrhea   Elevated lactic acid level   Elevated LFTs   Hypokalemia   Hypomagnesemia   Hypertension   Sinus tachycardia     Time reflects when diagnosis was documented in both MDM as applicable and the Disposition within this note     Time User Action Codes Description Comment    1/16/2021  4:52 PM Mile May Add [R53 1] Generalized weakness     1/16/2021  4:52 PM Madhu Banda [R11 2,  R19 7] Nausea vomiting and diarrhea     1/16/2021  4:52 PM [place]cross Josue Add [R79 89] Elevated lactic acid level     1/16/2021  4:52 PM Hebert Josue Add [R79 89] Elevated LFTs     1/16/2021  4:52 PM Hebert Josue Add [E87 6] Hypokalemia     1/16/2021  4:52 PM Hebert Josue Add [E83 42] Hypomagnesemia     1/16/2021  4:53 PM Hebert Josue Add [I10] Hypertension     1/16/2021  4:53 PM Hebert Josue Add [R00 0] Sinus tachycardia       ED Disposition     ED Disposition Condition Date/Time Comment    Admit Stable Sat Jan 16, 2021  4:52 PM Case was discussed with Dr Emeka Cook and the patient's admission status was agreed to be Admission Status: inpatient status to the service of Dr Emeka Cook  Follow-up Information    None         Current Discharge Medication List      CONTINUE these medications which have NOT CHANGED    Details   levofloxacin (LEVAQUIN) 750 mg tablet Take 1 tablet (750 mg total) by mouth every 24 hours for 5 days  Qty: 5 tablet, Refills: 0    Associated Diagnoses: Systemic viral illness      !! albuterol (PROVENTIL HFA,VENTOLIN HFA) 90 mcg/act inhaler Inhale 2 puffs 4 (four) times a day  Qty: 8 g, Refills: 0    Associated Diagnoses: Cough      !! albuterol (PROVENTIL HFA,VENTOLIN HFA) 90 mcg/act inhaler Inhale 2 puffs every 4 (four) hours as needed for wheezing  Qty: 1 Inhaler, Refills: 0    Comments: Substitution to a formulary equivalent within the same pharmaceutical class is authorized  Associated Diagnoses: Pneumonia of both lower lobes due to infectious organism      bisoprolol (ZEBETA) 5 mg tablet Take 5 mg by mouth daily      ergocalciferol (VITAMIN D2) 50,000 units Take 1 capsule by mouth once a week for 8 doses  Qty: 8 capsule, Refills: 0      lisinopril (ZESTRIL) 10 mg tablet Take 10 mg by mouth daily       ! ! - Potential duplicate medications found  Please discuss with provider  No discharge procedures on file      PDMP Review       Value Time User    PDMP Reviewed  Yes 11/27/2019 11:46 AM SCOTT Randle          ED Provider  Electronically Signed by           Ziggy Campoverde PA-C  01/16/21 2037

## 2021-01-16 NOTE — ASSESSMENT & PLAN NOTE
Patient states he began heavily drinking around September of 2020 to combat headaches  He consumes about 1/5 of whiskey, sometimes more daily   Librium taper completed

## 2021-01-17 LAB
ALBUMIN SERPL BCP-MCNC: 2.6 G/DL (ref 3.5–5)
ALP SERPL-CCNC: 75 U/L (ref 46–116)
ALT SERPL W P-5'-P-CCNC: 98 U/L (ref 12–78)
AMPHETAMINES SERPL QL SCN: NEGATIVE
ANION GAP SERPL CALCULATED.3IONS-SCNC: 10 MMOL/L (ref 4–13)
AST SERPL W P-5'-P-CCNC: 101 U/L (ref 5–45)
B PARAP IS1001 DNA NPH QL NAA+NON-PROBE: NOT DETECTED
B PERT.PT PRMT NPH QL NAA+NON-PROBE: NOT DETECTED
BARBITURATES UR QL: NEGATIVE
BASOPHILS # BLD AUTO: 0.07 THOUSANDS/ΜL (ref 0–0.1)
BASOPHILS NFR BLD AUTO: 2 % (ref 0–1)
BENZODIAZ UR QL: POSITIVE
BILIRUB SERPL-MCNC: 1.7 MG/DL (ref 0.2–1)
BUN SERPL-MCNC: 9 MG/DL (ref 5–25)
C PNEUM DNA NPH QL NAA+NON-PROBE: NOT DETECTED
CALCIUM ALBUM COR SERPL-MCNC: 9.2 MG/DL (ref 8.3–10.1)
CALCIUM SERPL-MCNC: 8.1 MG/DL (ref 8.3–10.1)
CHLORIDE SERPL-SCNC: 101 MMOL/L (ref 100–108)
CO2 SERPL-SCNC: 24 MMOL/L (ref 21–32)
COCAINE UR QL: NEGATIVE
CREAT SERPL-MCNC: 0.86 MG/DL (ref 0.6–1.3)
EOSINOPHIL # BLD AUTO: 0.11 THOUSAND/ΜL (ref 0–0.61)
EOSINOPHIL NFR BLD AUTO: 2 % (ref 0–6)
ERYTHROCYTE [DISTWIDTH] IN BLOOD BY AUTOMATED COUNT: 14.5 % (ref 11.6–15.1)
FLUAV RNA NPH QL NAA+NON-PROBE: NOT DETECTED
FLUBV RNA NPH QL NAA+NON-PROBE: NOT DETECTED
GFR SERPL CREATININE-BSD FRML MDRD: 104 ML/MIN/1.73SQ M
GLUCOSE SERPL-MCNC: 98 MG/DL (ref 65–140)
HADV DNA NPH QL NAA+NON-PROBE: NOT DETECTED
HAV IGM SER QL: NORMAL
HBV CORE IGM SER QL: NORMAL
HBV SURFACE AG SER QL: NORMAL
HCT VFR BLD AUTO: 39.7 % (ref 36.5–49.3)
HCV AB SER QL: NORMAL
HGB BLD-MCNC: 13.6 G/DL (ref 12–17)
HMPV RNA NPH QL NAA+NON-PROBE: NOT DETECTED
HPIV 1+2+3+4 RNA NPH QL NAA+NON-PROBE: NOT DETECTED
HPIV 1+2+3+4 RNA NPH QL NAA+NON-PROBE: NOT DETECTED
IMM GRANULOCYTES # BLD AUTO: 0.01 THOUSAND/UL (ref 0–0.2)
IMM GRANULOCYTES NFR BLD AUTO: 0 % (ref 0–2)
LACTATE SERPL-SCNC: 1.7 MMOL/L (ref 0.5–2)
LYMPHOCYTES # BLD AUTO: 1.58 THOUSANDS/ΜL (ref 0.6–4.47)
LYMPHOCYTES NFR BLD AUTO: 35 % (ref 14–44)
M PNEUMO DNA NPH QL NAA+NON-PROBE: NOT DETECTED
MAGNESIUM SERPL-MCNC: 2 MG/DL (ref 1.6–2.6)
MCH RBC QN AUTO: 35.9 PG (ref 26.8–34.3)
MCHC RBC AUTO-ENTMCNC: 34.3 G/DL (ref 31.4–37.4)
MCV RBC AUTO: 105 FL (ref 82–98)
METHADONE UR QL: NEGATIVE
MONOCYTES # BLD AUTO: 0.38 THOUSAND/ΜL (ref 0.17–1.22)
MONOCYTES NFR BLD AUTO: 8 % (ref 4–12)
NEUTROPHILS # BLD AUTO: 2.35 THOUSANDS/ΜL (ref 1.85–7.62)
NEUTS SEG NFR BLD AUTO: 53 % (ref 43–75)
NRBC BLD AUTO-RTO: 0 /100 WBCS
OPIATES UR QL SCN: POSITIVE
OXYCODONE+OXYMORPHONE UR QL SCN: NEGATIVE
PCP UR QL: NEGATIVE
PLATELET # BLD AUTO: 153 THOUSANDS/UL (ref 149–390)
PMV BLD AUTO: 10.1 FL (ref 8.9–12.7)
POTASSIUM SERPL-SCNC: 3.6 MMOL/L (ref 3.5–5.3)
PROCALCITONIN SERPL-MCNC: 1.03 NG/ML
PROT SERPL-MCNC: 6.3 G/DL (ref 6.4–8.2)
RBC # BLD AUTO: 3.79 MILLION/UL (ref 3.88–5.62)
RSV RNA NPH QL NAA+NON-PROBE: NOT DETECTED
RV+EV RNA NPH QL NAA+NON-PROBE: NOT DETECTED
SODIUM SERPL-SCNC: 135 MMOL/L (ref 136–145)
THC UR QL: NEGATIVE
WBC # BLD AUTO: 4.5 THOUSAND/UL (ref 4.31–10.16)

## 2021-01-17 PROCEDURE — 83735 ASSAY OF MAGNESIUM: CPT | Performed by: FAMILY MEDICINE

## 2021-01-17 PROCEDURE — C9113 INJ PANTOPRAZOLE SODIUM, VIA: HCPCS | Performed by: FAMILY MEDICINE

## 2021-01-17 PROCEDURE — 84145 PROCALCITONIN (PCT): CPT | Performed by: PHYSICIAN ASSISTANT

## 2021-01-17 PROCEDURE — 85025 COMPLETE CBC W/AUTO DIFF WBC: CPT | Performed by: FAMILY MEDICINE

## 2021-01-17 PROCEDURE — 80053 COMPREHEN METABOLIC PANEL: CPT | Performed by: FAMILY MEDICINE

## 2021-01-17 PROCEDURE — 99232 SBSQ HOSP IP/OBS MODERATE 35: CPT | Performed by: FAMILY MEDICINE

## 2021-01-17 PROCEDURE — 80307 DRUG TEST PRSMV CHEM ANLYZR: CPT | Performed by: FAMILY MEDICINE

## 2021-01-17 PROCEDURE — 83605 ASSAY OF LACTIC ACID: CPT | Performed by: PHYSICIAN ASSISTANT

## 2021-01-17 RX ORDER — LISINOPRIL 20 MG/1
20 TABLET ORAL DAILY
Status: DISCONTINUED | OUTPATIENT
Start: 2021-01-17 | End: 2021-01-18

## 2021-01-17 RX ORDER — POTASSIUM CHLORIDE 20MEQ/15ML
40 LIQUID (ML) ORAL ONCE
Status: DISCONTINUED | OUTPATIENT
Start: 2021-01-17 | End: 2021-01-18

## 2021-01-17 RX ORDER — POTASSIUM CHLORIDE 20 MEQ/1
20 TABLET, EXTENDED RELEASE ORAL 2 TIMES DAILY
Status: DISCONTINUED | OUTPATIENT
Start: 2021-01-17 | End: 2021-01-18

## 2021-01-17 RX ORDER — LORAZEPAM 1 MG/1
2 TABLET ORAL ONCE
Status: COMPLETED | OUTPATIENT
Start: 2021-01-17 | End: 2021-01-17

## 2021-01-17 RX ADMIN — CHLORDIAZEPOXIDE HYDROCHLORIDE 25 MG: 25 CAPSULE ORAL at 12:13

## 2021-01-17 RX ADMIN — CHLORDIAZEPOXIDE HYDROCHLORIDE 25 MG: 25 CAPSULE ORAL at 00:26

## 2021-01-17 RX ADMIN — PANTOPRAZOLE SODIUM 40 MG: 40 INJECTION, POWDER, LYOPHILIZED, FOR SOLUTION INTRAVENOUS at 08:13

## 2021-01-17 RX ADMIN — ENOXAPARIN SODIUM 40 MG: 40 INJECTION SUBCUTANEOUS at 08:13

## 2021-01-17 RX ADMIN — NICOTINE 1 PATCH: 21 PATCH, EXTENDED RELEASE TRANSDERMAL at 15:28

## 2021-01-17 RX ADMIN — LACTOBACILLUS ACIDOPHILUS / LACTOBACILLUS BULGARICUS 1 PACKET: 100 MILLION CFU STRENGTH GRANULES at 17:18

## 2021-01-17 RX ADMIN — SODIUM CHLORIDE, SODIUM LACTATE, POTASSIUM CHLORIDE, AND CALCIUM CHLORIDE 125 ML/HR: .6; .31; .03; .02 INJECTION, SOLUTION INTRAVENOUS at 04:53

## 2021-01-17 RX ADMIN — LISINOPRIL 20 MG: 20 TABLET ORAL at 08:13

## 2021-01-17 RX ADMIN — LORAZEPAM 2 MG: 1 TABLET ORAL at 00:35

## 2021-01-17 RX ADMIN — CHLORDIAZEPOXIDE HYDROCHLORIDE 25 MG: 25 CAPSULE ORAL at 05:00

## 2021-01-17 RX ADMIN — MAGNESIUM OXIDE TAB 400 MG (241.3 MG ELEMENTAL MG) 400 MG: 400 (241.3 MG) TAB at 18:27

## 2021-01-17 RX ADMIN — POTASSIUM CHLORIDE 20 MEQ: 1500 TABLET, EXTENDED RELEASE ORAL at 18:27

## 2021-01-17 RX ADMIN — BISOPROLOL FUMARATE 5 MG: 5 TABLET ORAL at 08:13

## 2021-01-17 RX ADMIN — B-COMPLEX W/ C & FOLIC ACID TAB 1 TABLET: TAB at 09:30

## 2021-01-17 RX ADMIN — LACTOBACILLUS ACIDOPHILUS / LACTOBACILLUS BULGARICUS 1 PACKET: 100 MILLION CFU STRENGTH GRANULES at 12:13

## 2021-01-17 RX ADMIN — LACTOBACILLUS ACIDOPHILUS / LACTOBACILLUS BULGARICUS 1 PACKET: 100 MILLION CFU STRENGTH GRANULES at 08:13

## 2021-01-17 RX ADMIN — MAGNESIUM OXIDE TAB 400 MG (241.3 MG ELEMENTAL MG) 400 MG: 400 (241.3 MG) TAB at 08:13

## 2021-01-17 RX ADMIN — CHLORDIAZEPOXIDE HYDROCHLORIDE 25 MG: 25 CAPSULE ORAL at 23:09

## 2021-01-17 RX ADMIN — CHLORDIAZEPOXIDE HYDROCHLORIDE 25 MG: 25 CAPSULE ORAL at 18:27

## 2021-01-17 RX ADMIN — POTASSIUM CHLORIDE 20 MEQ: 1500 TABLET, EXTENDED RELEASE ORAL at 08:27

## 2021-01-17 RX ADMIN — PANTOPRAZOLE SODIUM 40 MG: 40 INJECTION, POWDER, LYOPHILIZED, FOR SOLUTION INTRAVENOUS at 20:44

## 2021-01-17 NOTE — PROGRESS NOTES
[address] [facility] [address] [facility] Progress Note - Lencho Renner 54 y o  male MRN: [id number]    Unit/Bed#: 413-01 Encounter: [id number]      Assessment/Plan:  * SIRS (systemic inflammatory response syndrome) ([facility] [address] )  Assessment & Plan  On admission met SIRS criteria with HR and RR  Unclear if this due to alcohol use vs  Underlying infection  Blood cultures pending  Infectious work up pending    SIRS Resolved    Alcohol abuse  Assessment & Plan  Patient states he began heavily drinking around September of 2020 to combat headaches  He consumes about 1/5 of whiskey, sometimes more daily   Librium taper 25mg PO q6 x 2 days then q8 x 2 days then q12 x 2 days   CIWA protocol  Multivitamin daily       Nausea and vomiting  Assessment & Plan  Most likely secondary to ETOH and likely underlying gastritis   Associated with diarrhea that began around september as well  Protonix IV BID  Check Stool Studies (C diff, Enteric panel, Fecal Leucocytes, Ova and parasites) pending  H  Pylori stool antigen pending  Advanced diet  IV fluids decreased  Zofran     Elevated liver function tests  Assessment & Plan  Likely due to alcohol abuse, however given fevers / headaches will pursuse infectious etiology  Hepatitis panel / EBV / CMV / Babesiosis / Lyme Panel / procalcitonin pending  ID consult on Monday when available    Essential hypertension  Assessment & Plan   On admission Patient states he has not been taking his lisinopril or Zebeta  Resumed medications  BP has been continuously elevated  Currently asymptomatic  Lisinopril increased to 20 mg q d  Subjective:   Hernan Knutson was seen and examined at bedside  No acute events overnight  Upon entering the room he was asleep however responded to his name  He states that he continues to have headaches but denies all symptoms related to hypertensive emergency and urgency  His questions and concerns were answered and addressed      Objective:     Vitals: Blood pressure (!) 171/120, pulse 88, temperature 98 9 °F (37 2 °C), temperature source Temporal, resp  rate 16, height 5' 11" (1 803 m), weight 104 kg (229 lb 15 oz), SpO2 92 %  ,Body mass index is 32 07 kg/m²  Wt Readings from Last 3 Encounters:   01/16/21 104 kg (229 lb 15 oz)   01/11/21 102 kg (225 lb 15 5 oz)   12/27/20 104 kg (229 lb 15 oz)       Intake/Output Summary (Last 24 hours) at 1/17/2021 1114  Last data filed at 1/17/2021 0742  Gross per 24 hour   Intake 1420 ml   Output    Net 1420 ml       Physical Exam: BP (!) 171/120 (BP Location: Left arm)   Pulse 88   Temp 98 9 °F (37 2 °C) (Temporal)   Resp 16   Ht 5' 11" (1 803 m)   Wt 104 kg (229 lb 15 oz)   SpO2 92%   BMI 32 07 kg/m²   General appearance: alert and oriented, in no acute distress  Lungs: clear to auscultation bilaterally  Heart: regular rate and rhythm, S1, S2 normal, no murmur, click, rub or gallop  Abdomen: soft, non-tender; bowel sounds normal; no masses,  no organomegaly  Extremities: extremities normal, warm and well-perfused; no cyanosis, clubbing, or edema  Pulses: 2+ and symmetric     Recent Results (from the past 24 hour(s))   Blood culture #1    Collection Time: 01/16/21  2:30 PM    Specimen: Arm, Left; Blood   Result Value Ref Range    Blood Culture Received in Microbiology Lab  Culture in Progress      CBC and differential    Collection Time: 01/16/21  2:31 PM   Result Value Ref Range    WBC 7 76 4 31 - 10 16 Thousand/uL    RBC 4 66 3 88 - 5 62 Million/uL    Hemoglobin 16 7 12 0 - 17 0 g/dL    Hematocrit 47 0 36 5 - 49 3 %     (H) 82 - 98 fL    MCH 35 8 (H) 26 8 - 34 3 pg    MCHC 35 5 31 4 - 37 4 g/dL    RDW 14 3 11 6 - 15 1 %    MPV 9 5 8 9 - 12 7 fL    Platelets 328 277 - 485 Thousands/uL    nRBC 0 /100 WBCs    Neutrophils Relative 68 43 - 75 %    Immat GRANS % 1 0 - 2 %    Lymphocytes Relative 20 14 - 44 %    Monocytes Relative 9 4 - 12 %    Eosinophils Relative 1 0 - 6 %    Basophils Relative 1 0 - 1 %    Neutrophils Absolute 5 29 1 85 - 7 62 Thousands/µL    Immature Grans Absolute 0 04 0 00 - 0 20 Thousand/uL    Lymphocytes Absolute 1 58 0 60 - 4 47 Thousands/µL    Monocytes Absolute 0 70 0 17 - 1 22 Thousand/µL    Eosinophils Absolute 0 08 0 00 - 0 61 Thousand/µL    Basophils Absolute 0 07 0 00 - 0 10 Thousands/µL   Comprehensive metabolic panel    Collection Time: 01/16/21  2:31 PM   Result Value Ref Range    Sodium 133 (L) 136 - 145 mmol/L    Potassium 3 2 (L) 3 5 - 5 3 mmol/L    Chloride 95 (L) 100 - 108 mmol/L    CO2 25 21 - 32 mmol/L    ANION GAP 13 4 - 13 mmol/L    BUN 6 5 - 25 mg/dL    Creatinine 0 93 0 60 - 1 30 mg/dL    Glucose 146 (H) 65 - 140 mg/dL    Calcium 8 9 8 3 - 10 1 mg/dL    Corrected Calcium 9 4 8 3 - 10 1 mg/dL     (H) 5 - 45 U/L     (H) 12 - 78 U/L    Alkaline Phosphatase 101 46 - 116 U/L    Total Protein 8 1 6 4 - 8 2 g/dL    Albumin 3 4 (L) 3 5 - 5 0 g/dL    Total Bilirubin 1 40 (H) 0 20 - 1 00 mg/dL    eGFR 98 ml/min/1 73sq m   Troponin I    Collection Time: 01/16/21  2:31 PM   Result Value Ref Range    Troponin I <0 02 <=0 04 ng/mL   Green / Yellow tube on hold    Collection Time: 01/16/21  2:31 PM   Result Value Ref Range    Extra Tube Hold for add-ons  Lactic acid    Collection Time: 01/16/21  2:31 PM   Result Value Ref Range    LACTIC ACID 2 9 (HH) 0 5 - 2 0 mmol/L   Blood culture #2    Collection Time: 01/16/21  2:31 PM    Specimen: Arm, Right; Blood   Result Value Ref Range    Blood Culture Received in Microbiology Lab  Culture in Progress      Sedimentation rate, automated    Collection Time: 01/16/21  2:31 PM   Result Value Ref Range    Sed Rate 32 (H) 0 - 14 mm/hour   TSH    Collection Time: 01/16/21  3:50 PM   Result Value Ref Range    TSH 3RD GENERATON 1 441 0 358 - 3 740 uIU/mL   Magnesium    Collection Time: 01/16/21  3:50 PM   Result Value Ref Range    Magnesium 1 5 (L) 1 6 - 2 6 mg/dL   Lipase    Collection Time: 01/16/21  3:50 PM   Result Value Ref Range    Lipase 253 73 - 393 u/L   Protime-INR Collection Time: 01/16/21  3:50 PM   Result Value Ref Range    Protime 13 2 11 6 - 14 5 seconds    INR 1 02 0 84 - 1 19   APTT    Collection Time: 01/16/21  3:50 PM   Result Value Ref Range    PTT 25 23 - 37 seconds   C-reactive protein    Collection Time: 01/16/21  3:50 PM   Result Value Ref Range    CRP 2 6 <3 0 mg/L   UA (URINE) with reflex to Scope    Collection Time: 01/16/21  4:38 PM   Result Value Ref Range    Color, UA Yellow     Clarity, UA Clear     Specific [place], UA 1 005 1 003 - 1 030    pH, UA 7 5 4 5, 5 0, 5 5, 6 0, 6 5, 7 0, 7 5, 8 0    Leukocytes, UA Negative Negative    Nitrite, UA Negative Negative    Protein, UA Negative Negative mg/dl    Glucose, UA Negative Negative mg/dl    Ketones, UA Negative Negative mg/dl    Urobilinogen, UA 0 2 0 2, 1 0 E U /dl E U /dl    Bilirubin, UA Negative Negative    Blood, UA Trace (A) Negative   Urine Microscopic    Collection Time: 01/16/21  4:38 PM   Result Value Ref Range    RBC, UA 0-1 None Seen, 0-1, 1-2, 2-4, 0-5 /hpf    WBC, UA None Seen None Seen, 0-1, 1-2, 0-5, 2-4 /hpf    Epithelial Cells Occasional None Seen, Occasional /hpf    Bacteria, UA Occasional None Seen, Occasional /hpf   Lactic acid 2 Hours    Collection Time: 01/16/21  7:10 PM   Result Value Ref Range    LACTIC ACID 3 2 (HH) 0 5 - 2 0 mmol/L   Procalcitonin with AM Reflex    Collection Time: 01/16/21  7:46 PM   Result Value Ref Range    Procalcitonin 1 27 (H) <=0 25 ng/ml   [facility]    Collection Time: 01/16/21  8:28 PM    Specimen: Nasopharyngeal Swab; Nares   Result Value Ref Range    SARS-CoV-2 Negative Negative    INFLUENZA A PCR Negative Negative    INFLUENZA B PCR Negative Negative    RSV PCR Negative Negative   Lactic acid, plasma    Collection Time: 01/17/21 12:33 AM   Result Value Ref Range    LACTIC ACID 1 7 0 5 - 2 0 mmol/L   CBC and differential    Collection Time: 01/17/21  4:53 AM   Result Value Ref Range    WBC 4 50 4 31 - 10 16 Thousand/uL RBC 3 79 (L) 3 88 - 5 62 Million/uL    Hemoglobin 13 6 12 0 - 17 0 g/dL    Hematocrit 39 7 36 5 - 49 3 %     (H) 82 - 98 fL    MCH 35 9 (H) 26 8 - 34 3 pg    MCHC 34 3 31 4 - 37 4 g/dL    RDW 14 5 11 6 - 15 1 %    MPV 10 1 8 9 - 12 7 fL    Platelets 513 733 - 186 Thousands/uL    nRBC 0 /100 WBCs    Neutrophils Relative 53 43 - 75 %    Immat GRANS % 0 0 - 2 %    Lymphocytes Relative 35 14 - 44 %    Monocytes Relative 8 4 - 12 %    Eosinophils Relative 2 0 - 6 %    Basophils Relative 2 (H) 0 - 1 %    Neutrophils Absolute 2 35 1 85 - 7 62 Thousands/µL    Immature Grans Absolute 0 01 0 00 - 0 20 Thousand/uL    Lymphocytes Absolute 1 58 0 60 - 4 47 Thousands/µL    Monocytes Absolute 0 38 0 17 - 1 22 Thousand/µL    Eosinophils Absolute 0 11 0 00 - 0 61 Thousand/µL    Basophils Absolute 0 07 0 00 - 0 10 Thousands/µL   Comprehensive metabolic panel    Collection Time: 01/17/21  4:53 AM   Result Value Ref Range    Sodium 135 (L) 136 - 145 mmol/L    Potassium 3 6 3 5 - 5 3 mmol/L    Chloride 101 100 - 108 mmol/L    CO2 24 21 - 32 mmol/L    ANION GAP 10 4 - 13 mmol/L    BUN 9 5 - 25 mg/dL    Creatinine 0 86 0 60 - 1 30 mg/dL    Glucose 98 65 - 140 mg/dL    Calcium 8 1 (L) 8 3 - 10 1 mg/dL    Corrected Calcium 9 2 8 3 - 10 1 mg/dL     (H) 5 - 45 U/L    ALT 98 (H) 12 - 78 U/L    Alkaline Phosphatase 75 46 - 116 U/L    Total Protein 6 3 (L) 6 4 - 8 2 g/dL    Albumin 2 6 (L) 3 5 - 5 0 g/dL    Total Bilirubin 1 70 (H) 0 20 - 1 00 mg/dL    eGFR 104 ml/min/1 73sq m   Magnesium    Collection Time: 01/17/21  4:53 AM   Result Value Ref Range    Magnesium 2 0 1 6 - 2 6 mg/dL   Rapid drug screen, urine    Collection Time: 01/17/21  4:55 AM   Result Value Ref Range    Amph/Meth UR Negative Negative    Barbiturate Ur Negative Negative    Benzodiazepine Urine Positive (A) Negative    Cocaine Urine Negative Negative    Methadone Urine Negative Negative    Opiate Urine Positive (A) Negative    PCP Ur Negative Negative    THC Urine Negative Negative    Oxycodone Urine Negative Negative       Current Facility-Administered Medications   Medication Dose Route Frequency Provider Last Rate Last Admin    bisoprolol (ZEBETA) tablet 5 mg  5 mg Oral Daily Elena Cortez MD   5 mg at 01/17/21 0813    chlordiazePOXIDE (LIBRIUM) capsule 25 mg  25 mg Oral Q6H [facility] Elena Cortez MD   25 mg at 01/17/21 0500    enoxaparin (LOVENOX) subcutaneous injection 40 mg  40 mg Subcutaneous Daily Elena Cortez MD   40 mg at 01/17/21 0813    lactated ringers infusion  75 mL/hr Intravenous Continuous Marta Castillo MD 75 mL/hr at 01/17/21 1112 75 mL/hr at 01/17/21 1112    lactobacillus acidophilus-bulgaricus (FLORANEX) packet 1 packet  1 packet Oral TID With Meals Jey Pérez MD   1 packet at 01/17/21 0813    lisinopril (ZESTRIL) tablet 20 mg  20 mg Oral Daily Marta Castillo MD   20 mg at 01/17/21 0813    magnesium oxide (MAG-OX) tablet 400 mg  400 mg Oral BID Elena Cortez MD   400 mg at 01/17/21 0813    multivitamin stress formula tablet 1 tablet  1 tablet Oral Daily Elena Cortez MD   1 tablet at 01/17/21 0930    nicotine (NICODERM CQ) 21 mg/24 hr TD 24 hr patch 1 patch  1 patch Transdermal Daily Elena Cortez MD        ondansetron (ZOFRAN) injection 4 mg  4 mg Intravenous Q4H PRN Elena Cortez MD        pantoprazole (PROTONIX) injection 40 mg  40 mg Intravenous Q12H [facility] Elena Cortez MD   40 mg at 01/17/21 0813    potassium chloride (K-DUR,KLOR-CON) CR tablet 20 mEq  20 mEq Oral BID Marta Castillo MD   20 mEq at 01/17/21 0827    potassium chloride oral solution 40 mEq  40 mEq Oral Once Marta Castillo MD           Invasive Devices     Peripheral Intravenous Line            Peripheral IV 01/16/21 Left Antecubital less than 1 day    Peripheral IV 01/16/21 Right Forearm less than 1 day                Lab, Imaging and other studies: I have personally reviewed pertinent reports      VTE Pharmacologic Prophylaxis: Enoxaparin (Lovenox)  VTE Mechanical Prophylaxis: sequential compression device    Inder Valencia MD

## 2021-01-17 NOTE — PLAN OF CARE
Problem: Potential for Falls  Goal: Patient will remain free of falls  Description: INTERVENTIONS:  - Assess patient frequently for physical needs  -  Identify cognitive and physical deficits and behaviors that affect risk of falls    -  [employer] fall precautions as indicated by assessment   - Educate patient/family on patient safety including physical limitations  - Instruct patient to call for assistance with activity based on assessment  - Modify environment to reduce risk of injury  - Consider OT/PT consult to assist with strengthening/mobility  Outcome: Progressing     Problem: PAIN - ADULT  Goal: Verbalizes/displays adequate comfort level or baseline comfort level  Description: Interventions:  - Encourage patient to monitor pain and request assistance  - Assess pain using appropriate pain scale  - Administer analgesics based on type and severity of pain and evaluate response  - Implement non-pharmacological measures as appropriate and evaluate response  - Consider cultural and social influences on pain and pain management  - Notify physician/advanced practitioner if interventions unsuccessful or patient reports new pain  Outcome: Progressing     Problem: INFECTION - ADULT  Goal: Absence or prevention of progression during hospitalization  Description: INTERVENTIONS:  - Assess and monitor for signs and symptoms of infection  - Monitor lab/diagnostic results  - Monitor all insertion sites, i e  indwelling lines, tubes, and drains  - Monitor endotracheal if appropriate and nasal secretions for changes in amount and color  - [employer] appropriate cooling/warming therapies per order  - Administer medications as ordered  - Instruct and encourage patient and family to use good hand hygiene technique  - Identify and instruct in appropriate isolation precautions for identified infection/condition  Outcome: Progressing     Problem: SAFETY ADULT  Goal: Patient will remain free of falls  Description: INTERVENTIONS:  - Assess patient frequently for physical needs  -  Identify cognitive and physical deficits and behaviors that affect risk of falls    -  [employer] fall precautions as indicated by assessment   - Educate patient/family on patient safety including physical limitations  - Instruct patient to call for assistance with activity based on assessment  - Modify environment to reduce risk of injury  - Consider OT/PT consult to assist with strengthening/mobility  Outcome: Progressing  Goal: Maintain or return to baseline ADL function  Description: INTERVENTIONS:  -  Assess patient's ability to carry out ADLs; assess patient's baseline for ADL function and identify physical deficits which impact ability to perform ADLs (bathing, care of mouth/teeth, toileting, grooming, dressing, etc )  - Assess/evaluate cause of self-care deficits   - Assess range of motion  - Assess patient's mobility; develop plan if impaired  - Assess patient's need for assistive devices and provide as appropriate  - Encourage maximum independence but intervene and supervise when necessary  - Involve family in performance of ADLs  - Assess for home care needs following discharge   - Consider OT consult to assist with ADL evaluation and planning for discharge  - Provide patient education as appropriate  Outcome: Progressing  Goal: Maintain or return mobility status to optimal level  Description: INTERVENTIONS:  - Assess patient's baseline mobility status (ambulation, transfers, stairs, etc )    - Identify cognitive and physical deficits and behaviors that affect mobility  - Identify mobility aids required to assist with transfers and/or ambulation (gait belt, sit-to-stand, lift, walker, cane, etc )  - [employer] fall precautions as indicated by assessment  - Record patient progress and toleration of activity level on Mobility SBAR; progress patient to next Phase/Stage  - Instruct patient to call for assistance with activity based on assessment  - Consider rehabilitation consult to assist with strengthening/weightbearing, etc   Outcome: Progressing     Problem: DISCHARGE PLANNING  Goal: Discharge to home or other facility with appropriate resources  Description: INTERVENTIONS:  - Identify barriers to discharge w/patient and caregiver  - Arrange for needed discharge resources and transportation as appropriate  - Identify discharge learning needs (meds, wound care, etc )  - Arrange for interpretive services to assist at discharge as needed  - Refer to Case Management Department for coordinating discharge planning if the patient needs post-hospital services based on physician/advanced practitioner order or complex needs related to functional status, cognitive ability, or social support system  Outcome: Progressing     Problem: Knowledge Deficit  Goal: Patient/family/caregiver demonstrates understanding of disease process, treatment plan, medications, and discharge instructions  Description: Complete learning assessment and assess knowledge base    Interventions:  - Provide teaching at level of understanding  - Provide teaching via preferred learning methods  Outcome: Progressing

## 2021-01-17 NOTE — UTILIZATION REVIEW
Initial Clinical Review    Admission: Date/Time/Statement:   Admission Orders (From admission, onward)     Ordered        01/16/21 1658  Inpatient Admission  Once                   Orders Placed This Encounter   Procedures    Inpatient Admission     Standing Status:   Standing     Number of Occurrences:   1     Order Specific Question:   Level of Care     Answer:   Med Surg [16]     Order Specific Question:   Estimated length of stay     Answer:   More than 2 Midnights     Order Specific Question:   Certification     Answer:   I certify that inpatient services are medically necessary for this patient for a duration of greater than two midnights  See H&P and MD Progress Notes for additional information about the patient's course of treatment  ED Arrival Information     Expected Arrival Acuity Means of Arrival Escorted By Service Admission Type    - 1/16/2021 14:09 Emergent Walk-In Self General Medicine Emergency    Arrival Complaint    Weakness        Chief Complaint   Patient presents with    Weakness - Generalized     pt states he has had generalized weakness and n/v/d for two weeks, was seen and evaluated for the same in this ED twice, was diagnosed with pneumonia and placed on antibiotics with no relief     Assessment/Plan:   Mr Vidal Pereyra is a 55 yo male who presents to the ED from home with c/o generalized fatigue, chills,  fever, appetite change and symptoms that began in September including headache and subsequently progressed to nausea with dry heaving  He was seen in 12/2020 and dx with pneumonia and put on Azithromycin and Vantin  He returned 2 weeks later with same complaints and procalcitonin of 11 and was put on Levaquin  He finished all meds with no improvement  He has been drinking heavily since Sept because of the headaches  He is admitted to INPATIENT status with SIRS - unsure if r/t ETOH or underlying infection - blood cultures pending and labs    Nausea/vomiting - could be r/t ETOH or underlying gastritis - IV Protonix, stool studies, H pylori antigen, clear liquids, PRN Zofran  Alcohol abuse - CIWA, Librium q 6 hr x 2 days then q 8 hr x 2 days then q 12 hr x 2 days, MVI daily  Elevated liver function - Hepatitis panel / EBV / CMV / Babesiosis / Lyme Panel / procalcitonin, ID Consult  HTN - resume Lisiniopril and Virgin Gaunt  Initial CIWA 3      1/17 SIRS resolved  Still c/o headaches  CIWA - 10-3  He remains hypertensive  ED Triage Vitals [01/16/21 1421]   Temperature Pulse Respirations Blood Pressure SpO2   97 5 °F (36 4 °C) (!) 128 20 (!) 174/110 96 %      Temp Source Heart Rate Source Patient Position - Orthostatic VS BP Location FiO2 (%)   Temporal Monitor Lying Right arm --      Pain Score       7          Wt Readings from Last 1 Encounters:   01/16/21 104 kg (229 lb 15 oz)     Additional Vital Signs:   01/17/21 1218    90    145/104Abnormal   118  95 %       01/17/21 0955    88  16  171/120Abnormal   137      Lying   01/17/21 07:42:30    94    173/119Abnormal   137  92 %       01/17/21 07:41:05  98 9 °F (37 2 °C)  105  17  169/116Abnormal   134  92 %  None (Room air)  Lying   01/16/21 21:15:34  98 °F (36 7 °C)  104  18  169/115Abnormal   133  94 %  None (Room air)     01/16/21 1800  98 3 °F (36 8 °C)  107Abnormal   18  165/115Abnormal   133  95 %  None (Room air)  Lying   01/16/21 1746              None (Room air)     01/16/21 1645    116Abnormal   20  172/100Abnormal   130  97 %  None (Room air)  Sitting   01/16/21 1615    110Abnormal   20  165/100  127  97 %  None (Room air)  Lying   01/16/21 1545    113Abnormal   20  172/101Abnormal   129  97 %  None (Room air)  Lying   01/16/21 1445              None (Room air)       Pertinent Labs/Diagnostic Test Results:     1/16 CT PE study, abd, pelvis - 1  No evidence of acute pulmonary embolus, thoracic aortic aneurysm or dissection  No acute cardiopulmonary process    2   No evidence of acute intra-abdominal or pelvic process  1/16 ECG - ST rate 127 with nonspecific ST- T wave changes   , QRS 94, QT/QTc 342/497; compared to prior EKG, they are similar although today's rate has increased     Results from last 7 days   Lab Units 01/16/21 2028   SARS-COV-2  Negative     Results from last 7 days   Lab Units 01/17/21  0453 01/16/21  1431 01/11/21  1322   WBC Thousand/uL 4 50 7 76 7 67   HEMOGLOBIN g/dL 13 6 16 7 16 4   HEMATOCRIT % 39 7 47 0 47 2   PLATELETS Thousands/uL 153 226 168   NEUTROS ABS Thousands/µL 2 35 5 29 5 66     Results from last 7 days   Lab Units 01/17/21  0453 01/16/21  1550 01/16/21  1431 01/11/21  1322   SODIUM mmol/L 135*  --  133* 135*   POTASSIUM mmol/L 3 6  --  3 2* 3 0*   CHLORIDE mmol/L 101  --  95* 98*   CO2 mmol/L 24  --  25 26   ANION GAP mmol/L 10  --  13 11   BUN mg/dL 9  --  6 12   CREATININE mg/dL 0 86  --  0 93 0 99   EGFR ml/min/1 73sq m 104  --  98 91   CALCIUM mg/dL 8 1*  --  8 9 8 9   MAGNESIUM mg/dL 2 0 1 5*  --  1 7     Results from last 7 days   Lab Units 01/17/21  0453 01/16/21  1431 01/11/21  1322   AST U/L 101* 157* 179*   ALT U/L 98* 143* 148*   ALK PHOS U/L 75 101 100   TOTAL PROTEIN g/dL 6 3* 8 1 7 4   ALBUMIN g/dL 2 6* 3 4* 3 2*   TOTAL BILIRUBIN mg/dL 1 70* 1 40* 1 40*     Results from last 7 days   Lab Units 01/17/21  0453 01/16/21  1431 01/11/21  1322   GLUCOSE RANDOM mg/dL 98 146* 112     Results from last 7 days   Lab Units 01/11/21  1322   PH MUKESH  7 479*   PCO2 MUKESH mm Hg 30 0*   PO2 MUKESH mm Hg 35 1   HCO3 MUKESH mmol/L 21 8*   BASE EXC MUKESH mmol/L -0 6   O2 CONTENT MUKESH ml/dL 14 1   O2 HGB, VENOUS % 67 9     Results from last 7 days   Lab Units 01/16/21  1431   TROPONIN I ng/mL <0 02         Results from last 7 days   Lab Units 01/16/21  1550 01/11/21  1322   PROTIME seconds 13 2 13 9   INR  1 02 1 09   PTT seconds 25 25     Results from last 7 days   Lab Units 01/16/21  1550   TSH 3RD GENERATON uIU/mL 1 441     Results from last 7 days   Lab Units 01/16/21  1946 01/11/21  1322   PROCALCITONIN ng/ml 1 27* 11 02*     Results from last 7 days   Lab Units 01/17/21  0033 01/16/21  1910 01/16/21  1431 01/11/21  1519 01/11/21  1322   LACTIC ACID mmol/L 1 7 3 2* 2 9* 1 9 3 1*     Results from last 7 days   Lab Units 01/11/21  1322   NT-PRO BNP pg/mL 57     Results from last 7 days   Lab Units 01/16/21  1550 01/11/21  1322   LIPASE u/L 253 351     Results from last 7 days   Lab Units 01/16/21  1550 01/16/21  1431   CRP mg/L 2 6  --    SED RATE mm/hour  --  32*         Results from last 7 days   Lab Units 01/16/21  1638 01/11/21  1734   CLARITY UA  Clear Clear   COLOR UA  Yellow Lorie   SPEC GRAV UA  1 005 1 025   PH UA  7 5 6 5   GLUCOSE UA mg/dl Negative Negative   KETONES UA mg/dl Negative Negative   BLOOD UA  Trace* Trace*   PROTEIN UA mg/dl Negative 100 (2+)*   NITRITE UA  Negative Negative   BILIRUBIN UA  Negative Negative   UROBILINOGEN UA E U /dl 0 2 0 2   LEUKOCYTES UA  Negative Negative   WBC UA /hpf None Seen 0-1   RBC UA /hpf 0-1 2-4   BACTERIA UA /hpf Occasional Occasional   EPITHELIAL CELLS WET PREP /hpf Occasional Occasional   MUCUS THREADS   --  Moderate*     Results from last 7 days   Lab Units 01/16/21 2028   INFLUENZA A PCR  Negative   INFLUENZA B PCR  Negative   RSV PCR  Negative       Results from last 7 days   Lab Units 01/17/21  0455   AMPH/METH  Negative   BARBITURATE UR  Negative   BENZODIAZEPINE UR  Positive*   COCAINE UR  Negative   METHADONE URINE  Negative   OPIATE UR  Positive*   PCP UR  Negative   THC UR  Negative     Results from last 7 days   Lab Units 01/16/21  1431 01/16/21  1430 01/11/21  1323 01/11/21  1322   BLOOD CULTURE  Received in Microbiology Lab  Culture in Progress  Received in Microbiology Lab  Culture in Progress  No Growth After 5 Days  No Growth After 5 Days       ED Treatment:   Medication Administration from 01/16/2021 1409 to 01/16/2021 1718    Date/Time Order Dose Route Action   01/16/2021 1455 sodium chloride 0 9 % bolus 1,000 mL 1,000 mL Intravenous New Bag   01/16/2021 1507 iohexol (OMNIPAQUE) 350 MG/ML injection (SINGLE-DOSE) 100 mL 100 mL Intravenous Given   01/16/2021 1533 potassium chloride 20 mEq IVPB (premix) 20 mEq Intravenous New Bag   01/16/2021 1532 metoclopramide (REGLAN) injection 10 mg 10 mg Intravenous Given   01/16/2021 1531 sodium chloride 0 9 % bolus 1,000 mL 1,000 mL Intravenous New Bag   01/16/2021 1643 magnesium sulfate IVPB (premix) SOLN 1 g 1 g Intravenous New Bag        Past Medical History:   Diagnosis Date    Biceps tendon tear     Carpal tunnel syndrome     Chronic pain     back    Hypertension      Present on Admission:   Essential hypertension    Admitting Diagnosis: Sinus tachycardia [R00 0]  Hypokalemia [E87 6]  Hypomagnesemia [E83 42]  Weakness [R53 1]  Hypertension [I10]  Elevated LFTs [R79 89]  Generalized weakness [R53 1]  Elevated lactic acid level [R79 89]  Nausea vomiting and diarrhea [R11 2, R19 7]     Age/Sex: 54 y o  male     Admission Orders:    Scheduled Medications:  bisoprolol, 5 mg, Oral, Daily  chlordiazePOXIDE, 25 mg, Oral, Q6H NEIDA  enoxaparin, 40 mg, Subcutaneous, Daily  lactobacillus acidophilus-bulgaricus, 1 packet, Oral, TID With Meals  lisinopril, 20 mg, Oral, Daily  magnesium oxide, 400 mg, Oral, BID  multivitamin stress formula, 1 tablet, Oral, Daily  nicotine, 1 patch, Transdermal, Daily  pantoprazole, 40 mg, Intravenous, Q12H NEIDA  potassium chloride, 20 mEq, Oral, BID  potassium chloride, 40 mEq, Oral, Once      Continuous IV Infusions:  lactated ringers, 75 mL/hr, Intravenous, Continuous      PRN Meds:  ondansetron, 4 mg, Intravenous, Q4H PRN    SCDs  Tele  Ciwa  Continuous pulse ox  Stool studies, viral studies  Hepatitis panel  Regular diet       Network Utilization Review Department  ATTENTION: Please call with any questions or concerns to [phone] and carefully listen to the prompts so that you are directed to the right person   All voicemails are confidential   Joanne Pelletier all requests for admission clinical reviews, approved or denied determinations and any other requests to dedicated fax number below belonging to the campus where the patient is receiving treatment   List of dedicated fax numbers for the Facilities:  [address] [place] [address] DENIALS (Administrative/Medical Necessity) [phone]   [address] (Maternity/NICU/Pediatrics) [phone] [address] Joy Larios 7241 (  Doris Gonzalez "Erna" 103) [address] [address] Doug Wilcox 1481 [address] [place] [address] [phone]

## 2021-01-18 LAB
ALBUMIN SERPL BCP-MCNC: 2.5 G/DL (ref 3.5–5)
ALP SERPL-CCNC: 69 U/L (ref 46–116)
ALT SERPL W P-5'-P-CCNC: 94 U/L (ref 12–78)
ANION GAP SERPL CALCULATED.3IONS-SCNC: 9 MMOL/L (ref 4–13)
AST SERPL W P-5'-P-CCNC: 110 U/L (ref 5–45)
ATRIAL RATE: 127 BPM
BASOPHILS # BLD AUTO: 0.04 THOUSANDS/ΜL (ref 0–0.1)
BASOPHILS NFR BLD AUTO: 1 % (ref 0–1)
BILIRUB SERPL-MCNC: 1.3 MG/DL (ref 0.2–1)
BUN SERPL-MCNC: 11 MG/DL (ref 5–25)
CALCIUM ALBUM COR SERPL-MCNC: 9.2 MG/DL (ref 8.3–10.1)
CALCIUM SERPL-MCNC: 8 MG/DL (ref 8.3–10.1)
CHLORIDE SERPL-SCNC: 103 MMOL/L (ref 100–108)
CO2 SERPL-SCNC: 24 MMOL/L (ref 21–32)
CREAT SERPL-MCNC: 0.79 MG/DL (ref 0.6–1.3)
EOSINOPHIL # BLD AUTO: 0.08 THOUSAND/ΜL (ref 0–0.61)
EOSINOPHIL NFR BLD AUTO: 2 % (ref 0–6)
ERYTHROCYTE [DISTWIDTH] IN BLOOD BY AUTOMATED COUNT: 14.5 % (ref 11.6–15.1)
GFR SERPL CREATININE-BSD FRML MDRD: 108 ML/MIN/1.73SQ M
GLUCOSE SERPL-MCNC: 95 MG/DL (ref 65–140)
HCT VFR BLD AUTO: 36 % (ref 36.5–49.3)
HGB BLD-MCNC: 12.4 G/DL (ref 12–17)
HIV 1+2 AB+HIV1 P24 AG SERPL QL IA: NORMAL
IMM GRANULOCYTES # BLD AUTO: 0.01 THOUSAND/UL (ref 0–0.2)
IMM GRANULOCYTES NFR BLD AUTO: 0 % (ref 0–2)
LYMPHOCYTES # BLD AUTO: 1.23 THOUSANDS/ΜL (ref 0.6–4.47)
LYMPHOCYTES NFR BLD AUTO: 30 % (ref 14–44)
MAGNESIUM SERPL-MCNC: 2 MG/DL (ref 1.6–2.6)
MCH RBC QN AUTO: 35.9 PG (ref 26.8–34.3)
MCHC RBC AUTO-ENTMCNC: 34.4 G/DL (ref 31.4–37.4)
MCV RBC AUTO: 104 FL (ref 82–98)
MONOCYTES # BLD AUTO: 0.41 THOUSAND/ΜL (ref 0.17–1.22)
MONOCYTES NFR BLD AUTO: 10 % (ref 4–12)
NEUTROPHILS # BLD AUTO: 2.35 THOUSANDS/ΜL (ref 1.85–7.62)
NEUTS SEG NFR BLD AUTO: 57 % (ref 43–75)
NRBC BLD AUTO-RTO: 0 /100 WBCS
P AXIS: 45 DEGREES
PLATELET # BLD AUTO: 132 THOUSANDS/UL (ref 149–390)
PMV BLD AUTO: 9.7 FL (ref 8.9–12.7)
POTASSIUM SERPL-SCNC: 3.8 MMOL/L (ref 3.5–5.3)
PR INTERVAL: 130 MS
PROCALCITONIN SERPL-MCNC: 0.81 NG/ML
PROT SERPL-MCNC: 6 G/DL (ref 6.4–8.2)
QRS AXIS: 3 DEGREES
QRSD INTERVAL: 94 MS
QT INTERVAL: 342 MS
QTC INTERVAL: 497 MS
RBC # BLD AUTO: 3.45 MILLION/UL (ref 3.88–5.62)
SODIUM SERPL-SCNC: 136 MMOL/L (ref 136–145)
T WAVE AXIS: 68 DEGREES
VENTRICULAR RATE: 127 BPM
WBC # BLD AUTO: 4.12 THOUSAND/UL (ref 4.31–10.16)

## 2021-01-18 PROCEDURE — 93010 ELECTROCARDIOGRAM REPORT: CPT | Performed by: INTERNAL MEDICINE

## 2021-01-18 PROCEDURE — 84145 PROCALCITONIN (PCT): CPT | Performed by: STUDENT IN AN ORGANIZED HEALTH CARE EDUCATION/TRAINING PROGRAM

## 2021-01-18 PROCEDURE — C9113 INJ PANTOPRAZOLE SODIUM, VIA: HCPCS | Performed by: FAMILY MEDICINE

## 2021-01-18 PROCEDURE — 83735 ASSAY OF MAGNESIUM: CPT | Performed by: FAMILY MEDICINE

## 2021-01-18 PROCEDURE — 99232 SBSQ HOSP IP/OBS MODERATE 35: CPT | Performed by: INTERNAL MEDICINE

## 2021-01-18 PROCEDURE — 80053 COMPREHEN METABOLIC PANEL: CPT | Performed by: FAMILY MEDICINE

## 2021-01-18 PROCEDURE — 85025 COMPLETE CBC W/AUTO DIFF WBC: CPT | Performed by: FAMILY MEDICINE

## 2021-01-18 RX ORDER — POTASSIUM CHLORIDE 20 MEQ/1
20 TABLET, EXTENDED RELEASE ORAL 2 TIMES DAILY
Status: COMPLETED | OUTPATIENT
Start: 2021-01-18 | End: 2021-01-19

## 2021-01-18 RX ORDER — LISINOPRIL 20 MG/1
40 TABLET ORAL DAILY
Status: DISCONTINUED | OUTPATIENT
Start: 2021-01-19 | End: 2021-01-19 | Stop reason: HOSPADM

## 2021-01-18 RX ORDER — POLYETHYLENE GLYCOL 3350 17 G/17G
17 POWDER, FOR SOLUTION ORAL DAILY PRN
Status: DISCONTINUED | OUTPATIENT
Start: 2021-01-18 | End: 2021-01-19 | Stop reason: HOSPADM

## 2021-01-18 RX ADMIN — POTASSIUM CHLORIDE 20 MEQ: 1500 TABLET, EXTENDED RELEASE ORAL at 18:54

## 2021-01-18 RX ADMIN — POTASSIUM CHLORIDE 20 MEQ: 1500 TABLET, EXTENDED RELEASE ORAL at 08:06

## 2021-01-18 RX ADMIN — NICOTINE 1 PATCH: 21 PATCH, EXTENDED RELEASE TRANSDERMAL at 08:07

## 2021-01-18 RX ADMIN — LISINOPRIL 20 MG: 20 TABLET ORAL at 08:06

## 2021-01-18 RX ADMIN — LACTOBACILLUS ACIDOPHILUS / LACTOBACILLUS BULGARICUS 1 PACKET: 100 MILLION CFU STRENGTH GRANULES at 18:54

## 2021-01-18 RX ADMIN — ENOXAPARIN SODIUM 40 MG: 40 INJECTION SUBCUTANEOUS at 08:06

## 2021-01-18 RX ADMIN — LACTOBACILLUS ACIDOPHILUS / LACTOBACILLUS BULGARICUS 1 PACKET: 100 MILLION CFU STRENGTH GRANULES at 12:54

## 2021-01-18 RX ADMIN — MAGNESIUM OXIDE TAB 400 MG (241.3 MG ELEMENTAL MG) 400 MG: 400 (241.3 MG) TAB at 08:06

## 2021-01-18 RX ADMIN — B-COMPLEX W/ C & FOLIC ACID TAB 1 TABLET: TAB at 08:15

## 2021-01-18 RX ADMIN — CHLORDIAZEPOXIDE HYDROCHLORIDE 25 MG: 25 CAPSULE ORAL at 05:38

## 2021-01-18 RX ADMIN — CHLORDIAZEPOXIDE HYDROCHLORIDE 25 MG: 25 CAPSULE ORAL at 12:54

## 2021-01-18 RX ADMIN — MAGNESIUM OXIDE TAB 400 MG (241.3 MG ELEMENTAL MG) 400 MG: 400 (241.3 MG) TAB at 18:54

## 2021-01-18 RX ADMIN — PANTOPRAZOLE SODIUM 40 MG: 40 INJECTION, POWDER, LYOPHILIZED, FOR SOLUTION INTRAVENOUS at 20:58

## 2021-01-18 RX ADMIN — BISOPROLOL FUMARATE 5 MG: 5 TABLET ORAL at 08:06

## 2021-01-18 RX ADMIN — PANTOPRAZOLE SODIUM 40 MG: 40 INJECTION, POWDER, LYOPHILIZED, FOR SOLUTION INTRAVENOUS at 08:06

## 2021-01-18 RX ADMIN — LACTOBACILLUS ACIDOPHILUS / LACTOBACILLUS BULGARICUS 1 PACKET: 100 MILLION CFU STRENGTH GRANULES at 08:15

## 2021-01-18 NOTE — CASE MANAGEMENT
Cm met with the patient to evaluate the patients prior function and living situation and any barriers to d/c and form a safe d/c plan  Cm also evaluated the patient for any services in the home or needs for services  Patient states he lives w/his son in a house w/3 ROBERTA's and 13 inside steps  States he was working up until a few weeks ago when he got sick  He drives and drove himself here and plans on driving self home on discharge  He does not have any DME's  He is independent w/his ADL's  He uses [employer]  He does not have a PCP and is willing to be set up with appointment w/[facility] in [facility] on discharge    CM will follow with discharge needs

## 2021-01-18 NOTE — PROGRESS NOTES
[address] [facility] [address] [facility] Progress Note - Alonzo Husain 54 y o  male MRN: [id number]    Unit/Bed#: 413-01 Encounter: [id number]      Assessment/Plan:  * SIRS (systemic inflammatory response syndrome) ([facility] [address] )  Assessment & Plan  On admission met SIRS criteria with HR and RR  Unclear if this due to alcohol use vs  Underlying infection  Blood cultures NGTD  COVID and Respiratory pathogen panel negative    Infectious work up pending    SIRS Resolved    Alcohol abuse  Assessment & Plan  Patient states he began heavily drinking around September of 2020 to combat headaches  He consumes about 1/5 of whiskey, sometimes more daily   Librium taper 25mg PO q6 x 2 days then q8 x 2 days then q12 x 2 days   CIWA protocol  Multivitamin daily       Nausea and vomiting  Assessment & Plan  Most likely secondary to ETOH and likely underlying gastritis   Associated with diarrhea that began around september as well  Protonix IV BID  Check Stool Studies (C diff, Enteric panel, Fecal Leucocytes, Ova and parasites) pending  H  Pylori stool antigen pending  Advanced diet  IV fluids decreased  Zofran     Elevated liver function tests  Assessment & Plan  Likely due to alcohol abuse  Hepatitis panel / EBV / CMV / Babesiosis / Lyme Panel   procalcitonin downward trending      Essential hypertension  Assessment & Plan  On admission Patient states he has not been taking his lisinopril or Jose Hutching may be d/t elevated BP  Resumed medications  BP has been continuously elevated  Currently asymptomatic  Lisinopril increased to 40 mg q d  Subjective:   Aniya Red was seen and examined at bedside  Upon entering the room he was awake, alert, sitting upright in bed watching TV  States that he is not feeling well this morning as he is having a sweats and tremors  He denies all other symptoms on review of systems  He stated that he felt great yesterday    It was explained to him that this may be due to alcohol withdrawal as he is weaning off of Librium at this time  All his questions and concerns were answered and addressed  Objective:     Vitals: Blood pressure (!) 163/116, pulse 89, temperature 98 8 °F (37 1 °C), temperature source Oral, resp  rate 18, height 5' 11" (1 803 m), weight 104 kg (229 lb 15 oz), SpO2 96 %  ,Body mass index is 32 07 kg/m²    Wt Readings from Last 3 Encounters:   01/16/21 104 kg (229 lb 15 oz)   01/11/21 102 kg (225 lb 15 5 oz)   12/27/20 104 kg (229 lb 15 oz)       Intake/Output Summary (Last 24 hours) at 1/18/2021 1108  Last data filed at 1/17/2021 1314  Gross per 24 hour   Intake 360 ml   Output    Net 360 ml       Physical Exam: BP (!) 163/116   Pulse 89   Temp 98 8 °F (37 1 °C) (Oral)   Resp 18   Ht 5' 11" (1 803 m)   Wt 104 kg (229 lb 15 oz)   SpO2 96%   BMI 32 07 kg/m²   General appearance: alert and oriented, in no acute distress and Diaphoretic  Lungs: clear to auscultation bilaterally  Heart: regular rate and rhythm, S1, S2 normal, no murmur, click, rub or gallop  Abdomen: soft, non-tender; bowel sounds normal; no masses,  no organomegaly  Extremities: extremities normal, warm and well-perfused; no cyanosis, clubbing, or edema     Recent Results (from the past 24 hour(s))   CBC and differential    Collection Time: 01/18/21  5:41 AM   Result Value Ref Range    WBC 4 12 (L) 4 31 - 10 16 Thousand/uL    RBC 3 45 (L) 3 88 - 5 62 Million/uL    Hemoglobin 12 4 12 0 - 17 0 g/dL    Hematocrit 36 0 (L) 36 5 - 49 3 %     (H) 82 - 98 fL    MCH 35 9 (H) 26 8 - 34 3 pg    MCHC 34 4 31 4 - 37 4 g/dL    RDW 14 5 11 6 - 15 1 %    MPV 9 7 8 9 - 12 7 fL    Platelets 281 (L) 779 - 390 Thousands/uL    nRBC 0 /100 WBCs    Neutrophils Relative 57 43 - 75 %    Immat GRANS % 0 0 - 2 %    Lymphocytes Relative 30 14 - 44 %    Monocytes Relative 10 4 - 12 %    Eosinophils Relative 2 0 - 6 %    Basophils Relative 1 0 - 1 %    Neutrophils Absolute 2 35 1 85 - 7 62 Thousands/µL    Immature Grans Absolute 0 01 0 00 - 0 20 Thousand/uL Lymphocytes Absolute 1 23 0 60 - 4 47 Thousands/µL    Monocytes Absolute 0 41 0 17 - 1 22 Thousand/µL    Eosinophils Absolute 0 08 0 00 - 0 61 Thousand/µL    Basophils Absolute 0 04 0 00 - 0 10 Thousands/µL   Comprehensive metabolic panel    Collection Time: 01/18/21  5:41 AM   Result Value Ref Range    Sodium 136 136 - 145 mmol/L    Potassium 3 8 3 5 - 5 3 mmol/L    Chloride 103 100 - 108 mmol/L    CO2 24 21 - 32 mmol/L    ANION GAP 9 4 - 13 mmol/L    BUN 11 5 - 25 mg/dL    Creatinine 0 79 0 60 - 1 30 mg/dL    Glucose 95 65 - 140 mg/dL    Calcium 8 0 (L) 8 3 - 10 1 mg/dL    Corrected Calcium 9 2 8 3 - 10 1 mg/dL     (H) 5 - 45 U/L    ALT 94 (H) 12 - 78 U/L    Alkaline Phosphatase 69 46 - 116 U/L    Total Protein 6 0 (L) 6 4 - 8 2 g/dL    Albumin 2 5 (L) 3 5 - 5 0 g/dL    Total Bilirubin 1 30 (H) 0 20 - 1 00 mg/dL    eGFR 108 ml/min/1 73sq m   Magnesium    Collection Time: 01/18/21  5:41 AM   Result Value Ref Range    Magnesium 2 0 1 6 - 2 6 mg/dL       Current Facility-Administered Medications   Medication Dose Route Frequency Provider Last Rate Last Admin    bisoprolol (ZEBETA) tablet 5 mg  5 mg Oral Daily Elena Cortez MD   5 mg at 01/18/21 0806    chlordiazePOXIDE (LIBRIUM) capsule 25 mg  25 mg Oral Q6H [address] Elena Cortez MD   25 mg at 01/18/21 0538    enoxaparin (LOVENOX) subcutaneous injection 40 mg  40 mg Subcutaneous Daily Elena Cortez MD   40 mg at 01/18/21 0806    lactated ringers infusion  75 mL/hr Intravenous Continuous Lucinda Evans MD 75 mL/hr at 01/17/21 1112 75 mL/hr at 01/17/21 1112    lactobacillus acidophilus-bulgaricus (FLORANEX) packet 1 packet  1 packet Oral TID With Meals David Chen MD   1 packet at 01/18/21 0815    [START ON 1/19/2021] lisinopril (ZESTRIL) tablet 40 mg  40 mg Oral Daily Lucinda Evans MD        magnesium oxide (MAG-OX) tablet 400 mg  400 mg Oral BID Elena Cortez MD   400 mg at 01/18/21 1361    multivitamin stress formula tablet 1 tablet  1 tablet Oral Daily Rafy Rendon MD   1 tablet at 01/18/21 0815    nicotine (NICODERM CQ) 21 mg/24 hr TD 24 hr patch 1 patch  1 patch Transdermal Daily Rafy Rendon MD   1 patch at 01/18/21 0807    ondansetron (ZOFRAN) injection 4 mg  4 mg Intravenous Q4H PRN Elena Cortez MD        pantoprazole (PROTONIX) injection 40 mg  40 mg Intravenous Q12H [facility] Elena Cortez MD   40 mg at 01/18/21 0806    polyethylene glycol (MIRALAX) packet 17 g  17 g Oral Daily PRN Marissa Carmona MD        potassium chloride (K-DUR,KLOR-CON) CR tablet 20 mEq  20 mEq Oral BID Marissa Carmona MD           Invasive Devices     Peripheral Intravenous Line            Peripheral IV 01/16/21 Left Antecubital 1 day                Lab, Imaging and other studies: I have personally reviewed pertinent reports      VTE Pharmacologic Prophylaxis: Enoxaparin (Lovenox)  VTE Mechanical Prophylaxis: sequential compression device    Marissa Carmona MD

## 2021-01-19 ENCOUNTER — TRANSITIONAL CARE MANAGEMENT (OUTPATIENT)
Dept: FAMILY MEDICINE CLINIC | Facility: CLINIC | Age: 46
End: 2021-01-19

## 2021-01-19 ENCOUNTER — TELEPHONE (OUTPATIENT)
Dept: FAMILY MEDICINE CLINIC | Facility: CLINIC | Age: 46
End: 2021-01-19

## 2021-01-19 VITALS
HEIGHT: 71 IN | DIASTOLIC BLOOD PRESSURE: 114 MMHG | OXYGEN SATURATION: 97 % | BODY MASS INDEX: 32.19 KG/M2 | TEMPERATURE: 98.2 F | SYSTOLIC BLOOD PRESSURE: 160 MMHG | HEART RATE: 84 BPM | WEIGHT: 229.94 LBS | RESPIRATION RATE: 18 BRPM

## 2021-01-19 PROBLEM — J18.9 PNEUMONIA OF BOTH LOWER LOBES DUE TO INFECTIOUS ORGANISM: Status: RESOLVED | Noted: 2020-12-27 | Resolved: 2021-01-19

## 2021-01-19 PROBLEM — G44.89 OTHER HEADACHE SYNDROME: Status: ACTIVE | Noted: 2021-01-19

## 2021-01-19 LAB
ALBUMIN SERPL BCP-MCNC: 2.8 G/DL (ref 3.5–5)
ALP SERPL-CCNC: 72 U/L (ref 46–116)
ALT SERPL W P-5'-P-CCNC: 112 U/L (ref 12–78)
ANION GAP SERPL CALCULATED.3IONS-SCNC: 6 MMOL/L (ref 4–13)
AST SERPL W P-5'-P-CCNC: 134 U/L (ref 5–45)
B BURGDOR IGG+IGM SER-ACNC: 1521
BASOPHILS # BLD AUTO: 0.06 THOUSANDS/ΜL (ref 0–0.1)
BASOPHILS NFR BLD AUTO: 2 % (ref 0–1)
BILIRUB SERPL-MCNC: 0.9 MG/DL (ref 0.2–1)
BUN SERPL-MCNC: 10 MG/DL (ref 5–25)
CALCIUM ALBUM COR SERPL-MCNC: 9.1 MG/DL (ref 8.3–10.1)
CALCIUM SERPL-MCNC: 8.1 MG/DL (ref 8.3–10.1)
CHLORIDE SERPL-SCNC: 101 MMOL/L (ref 100–108)
CMV IGG SERPL IA-ACNC: <0.6 U/ML (ref 0–0.59)
CMV IGM SERPL IA-ACNC: <30 AU/ML (ref 0–29.9)
CO2 SERPL-SCNC: 26 MMOL/L (ref 21–32)
CREAT SERPL-MCNC: 0.78 MG/DL (ref 0.6–1.3)
EBV NA IGG SER IA-ACNC: 555 U/ML (ref 0–17.9)
EBV VCA IGG SER IA-ACNC: >600 U/ML (ref 0–17.9)
EBV VCA IGM SER IA-ACNC: <36 U/ML (ref 0–35.9)
EOSINOPHIL # BLD AUTO: 0.08 THOUSAND/ΜL (ref 0–0.61)
EOSINOPHIL NFR BLD AUTO: 2 % (ref 0–6)
ERYTHROCYTE [DISTWIDTH] IN BLOOD BY AUTOMATED COUNT: 14.6 % (ref 11.6–15.1)
GFR SERPL CREATININE-BSD FRML MDRD: 108 ML/MIN/1.73SQ M
GLUCOSE SERPL-MCNC: 93 MG/DL (ref 65–140)
HCT VFR BLD AUTO: 42.6 % (ref 36.5–49.3)
HGB BLD-MCNC: 14 G/DL (ref 12–17)
IMM GRANULOCYTES # BLD AUTO: 0.02 THOUSAND/UL (ref 0–0.2)
IMM GRANULOCYTES NFR BLD AUTO: 1 % (ref 0–2)
INR PPP: 1.03 (ref 0.84–1.19)
INTERPRETATION: ABNORMAL
LYMPHOCYTES # BLD AUTO: 1.26 THOUSANDS/ΜL (ref 0.6–4.47)
LYMPHOCYTES NFR BLD AUTO: 31 % (ref 14–44)
MAGNESIUM SERPL-MCNC: 2.1 MG/DL (ref 1.6–2.6)
MCH RBC QN AUTO: 35.4 PG (ref 26.8–34.3)
MCHC RBC AUTO-ENTMCNC: 32.9 G/DL (ref 31.4–37.4)
MCV RBC AUTO: 108 FL (ref 82–98)
MONOCYTES # BLD AUTO: 0.39 THOUSAND/ΜL (ref 0.17–1.22)
MONOCYTES NFR BLD AUTO: 10 % (ref 4–12)
NEUTROPHILS # BLD AUTO: 2.25 THOUSANDS/ΜL (ref 1.85–7.62)
NEUTS SEG NFR BLD AUTO: 54 % (ref 43–75)
NRBC BLD AUTO-RTO: 0 /100 WBCS
PHOSPHATE SERPL-MCNC: 4 MG/DL (ref 2.7–4.5)
PLATELET # BLD AUTO: 159 THOUSANDS/UL (ref 149–390)
PMV BLD AUTO: 10.3 FL (ref 8.9–12.7)
POTASSIUM SERPL-SCNC: 3.5 MMOL/L (ref 3.5–5.3)
PROCALCITONIN SERPL-MCNC: 0.47 NG/ML
PROT SERPL-MCNC: 6.8 G/DL (ref 6.4–8.2)
PROTHROMBIN TIME: 13.3 SECONDS (ref 11.6–14.5)
RBC # BLD AUTO: 3.96 MILLION/UL (ref 3.88–5.62)
SODIUM SERPL-SCNC: 133 MMOL/L (ref 136–145)
WBC # BLD AUTO: 4.06 THOUSAND/UL (ref 4.31–10.16)

## 2021-01-19 PROCEDURE — 84145 PROCALCITONIN (PCT): CPT | Performed by: STUDENT IN AN ORGANIZED HEALTH CARE EDUCATION/TRAINING PROGRAM

## 2021-01-19 PROCEDURE — 80053 COMPREHEN METABOLIC PANEL: CPT | Performed by: INTERNAL MEDICINE

## 2021-01-19 PROCEDURE — 84100 ASSAY OF PHOSPHORUS: CPT | Performed by: INTERNAL MEDICINE

## 2021-01-19 PROCEDURE — 85610 PROTHROMBIN TIME: CPT | Performed by: INTERNAL MEDICINE

## 2021-01-19 PROCEDURE — 85025 COMPLETE CBC W/AUTO DIFF WBC: CPT | Performed by: INTERNAL MEDICINE

## 2021-01-19 PROCEDURE — 83735 ASSAY OF MAGNESIUM: CPT | Performed by: INTERNAL MEDICINE

## 2021-01-19 PROCEDURE — 99239 HOSP IP/OBS DSCHRG MGMT >30: CPT | Performed by: INTERNAL MEDICINE

## 2021-01-19 PROCEDURE — C9113 INJ PANTOPRAZOLE SODIUM, VIA: HCPCS | Performed by: FAMILY MEDICINE

## 2021-01-19 RX ORDER — BUTALBITAL, ACETAMINOPHEN AND CAFFEINE 300; 40; 50 MG/1; MG/1; MG/1
1 CAPSULE ORAL 2 TIMES DAILY PRN
Qty: 60 CAPSULE | Refills: 0 | Status: SHIPPED | OUTPATIENT
Start: 2021-01-19

## 2021-01-19 RX ORDER — LISINOPRIL 40 MG/1
40 TABLET ORAL DAILY
Qty: 30 TABLET | Refills: 0 | Status: SHIPPED | OUTPATIENT
Start: 2021-01-20

## 2021-01-19 RX ORDER — NICOTINE 21 MG/24HR
1 PATCH, TRANSDERMAL 24 HOURS TRANSDERMAL DAILY
Qty: 28 PATCH | Refills: 0 | Status: SHIPPED | OUTPATIENT
Start: 2021-01-20

## 2021-01-19 RX ORDER — PANTOPRAZOLE SODIUM 40 MG/1
40 TABLET, DELAYED RELEASE ORAL 2 TIMES DAILY
Qty: 60 TABLET | Refills: 0 | Status: SHIPPED | OUTPATIENT
Start: 2021-01-19 | End: 2021-02-18

## 2021-01-19 RX ORDER — ALBUTEROL SULFATE 90 UG/1
2 AEROSOL, METERED RESPIRATORY (INHALATION) EVERY 6 HOURS PRN
Qty: 1 INHALER | Refills: 0 | Status: SHIPPED | OUTPATIENT
Start: 2021-01-19

## 2021-01-19 RX ORDER — BISOPROLOL FUMARATE 5 MG/1
5 TABLET ORAL DAILY
Qty: 30 TABLET | Refills: 0 | Status: SHIPPED | OUTPATIENT
Start: 2021-01-19

## 2021-01-19 RX ADMIN — B-COMPLEX W/ C & FOLIC ACID TAB 1 TABLET: TAB at 09:25

## 2021-01-19 RX ADMIN — LACTOBACILLUS ACIDOPHILUS / LACTOBACILLUS BULGARICUS 1 PACKET: 100 MILLION CFU STRENGTH GRANULES at 09:25

## 2021-01-19 RX ADMIN — LACTOBACILLUS ACIDOPHILUS / LACTOBACILLUS BULGARICUS 1 PACKET: 100 MILLION CFU STRENGTH GRANULES at 12:20

## 2021-01-19 RX ADMIN — POTASSIUM CHLORIDE 20 MEQ: 1500 TABLET, EXTENDED RELEASE ORAL at 09:25

## 2021-01-19 RX ADMIN — BISOPROLOL FUMARATE 5 MG: 5 TABLET ORAL at 09:22

## 2021-01-19 RX ADMIN — NICOTINE 1 PATCH: 21 PATCH, EXTENDED RELEASE TRANSDERMAL at 09:23

## 2021-01-19 RX ADMIN — ENOXAPARIN SODIUM 40 MG: 40 INJECTION SUBCUTANEOUS at 09:22

## 2021-01-19 RX ADMIN — LISINOPRIL 40 MG: 20 TABLET ORAL at 09:22

## 2021-01-19 RX ADMIN — PANTOPRAZOLE SODIUM 40 MG: 40 INJECTION, POWDER, LYOPHILIZED, FOR SOLUTION INTRAVENOUS at 09:22

## 2021-01-19 RX ADMIN — MAGNESIUM OXIDE TAB 400 MG (241.3 MG ELEMENTAL MG) 400 MG: 400 (241.3 MG) TAB at 09:22

## 2021-01-19 NOTE — TELEPHONE ENCOUNTER
Tried calling patient but cell phone number but voicemail is full  Home phone number in chart is not a working number      ----- Message from Juliana Aguirre sent at 1/19/2021 12:24 PM EST -----  Regarding: establish with your office  Pt is being dc'd home today and doesn't have a PCP if you can call pt to establish him with your office thanks

## 2021-01-19 NOTE — CASE MANAGEMENT
Discussed with patient preferences on discharge;understanding how to manage health at home; purpose of taking medications; importance of follow up care/appointments; and symptoms to watch out for once discharged home  Pt is being dc'd home on this date  Does not have a PCP and agreeable to being established with [facility]  CM in basket messaged the office to follow up with pt to establish him with a PCP there

## 2021-01-19 NOTE — NURSING NOTE
AVS printed and gone over with patient  Patient voiced understanding and had no questions at time of discharge  Patient left floor via walking accompanied by RN in stable condition

## 2021-01-19 NOTE — DISCHARGE SUMMARY
Discharge- Emigdio Snow 1975, 54 y o  male MRN: [id number]    Unit/Bed#: 413-01 Encounter: [id number]    Primary Care Provider: No primary care provider on file  Date and time admitted to hospital: 2021  2:12 PM        * SIRS (systemic inflammatory response syndrome) ([facility] )  Assessment & Plan  On admission met SIRS criteria with HR and RR  Sirs present on admission, likely secondary to volume depletion, possible alcohol induced gastritis    No evidence of specific infection  SIRS Resolved    Nausea and vomiting  Assessment & Plan  Most likely secondary to ETOH and suspected alcohol-induced gastritis    Discharge on Protonix 40 mg p o  Twice daily times 30 days  Alcohol cessation/abstinence advised  Alcohol abuse  Assessment & Plan  Patient states he began heavily drinking around 2020 to combat headaches  He consumes about 1/5 of whiskey, sometimes more daily   Librium taper completed    Essential hypertension  Assessment & Plan  Resume blood pressure medication at discharge, lisinopril increased to 40 mg daily      Other headache syndrome  Assessment & Plan  Patient reports ongoing mild headache, ambulatory referral to Neurology requested  Elevated liver function tests  Assessment & Plan  Likely due to alcohol abuse    Follow-up Lyme testing            Code Status: Level 1 - Full Code      Subjective:   No pain, overall feeling better    Objective:     Vitals:   Temp (24hrs), Av 4 °F (36 9 °C), Min:98 1 °F (36 7 °C), Max:98 8 °F (37 1 °C)    Temp:  [98 1 °F (36 7 °C)-98 8 °F (37 1 °C)] 98 2 °F (36 8 °C)  HR:  [84] 84  Resp:  [18] 18  BP: (141-160)/() 160/114  SpO2:  [94 %-97 %] 97 %  Body mass index is 32 07 kg/m²  Input and Output Summary (last 24 hours):        Intake/Output Summary (Last 24 hours) at 2021 1222  Last data filed at 2021 0815  Gross per 24 hour   Intake 360 ml   Output    Net 360 ml       Physical Exam:     Physical Exam  Vitals signs and nursing note reviewed  Constitutional:       General: He is not in acute distress  Appearance: Normal appearance  He is normal weight  He is not ill-appearing, toxic-appearing or diaphoretic  Pulmonary:      Effort: Pulmonary effort is normal    Skin:     General: Skin is warm  Neurological:      General: No focal deficit present  Mental Status: He is alert and oriented to person, place, and time  Mental status is at baseline  Psychiatric:         Mood and Affect: Mood normal          Behavior: Behavior normal          Thought Content:  Thought content normal          Judgment: Judgment normal        Additional Data:     Labs:    Results from last 7 days   Lab Units 01/19/21  0616   WBC Thousand/uL 4 06*   HEMOGLOBIN g/dL 14 0   HEMATOCRIT % 42 6   PLATELETS Thousands/uL 159   NEUTROS PCT % 54   LYMPHS PCT % 31   MONOS PCT % 10   EOS PCT % 2     Results from last 7 days   Lab Units 01/19/21  0616   POTASSIUM mmol/L 3 5   CHLORIDE mmol/L 101   CO2 mmol/L 26   BUN mg/dL 10   CREATININE mg/dL 0 78   CALCIUM mg/dL 8 1*   ALK PHOS U/L 72   ALT U/L 112*   AST U/L 134*     Results from last 7 days   Lab Units 01/19/21  0616   INR  1 03       Total time spent on discharge 35 minutes

## 2021-01-20 LAB
B BURGDOR IGG PATRN SER IB-IMP: POSITIVE
B BURGDOR IGM PATRN SER IB-IMP: POSITIVE
B BURGDOR18KD IGG SER QL IB: PRESENT
B BURGDOR23KD IGG SER QL IB: ABNORMAL
B BURGDOR23KD IGM SER QL IB: PRESENT
B BURGDOR28KD IGG SER QL IB: PRESENT
B BURGDOR30KD IGG SER QL IB: PRESENT
B BURGDOR39KD IGG SER QL IB: PRESENT
B BURGDOR39KD IGM SER QL IB: ABNORMAL
B BURGDOR41KD IGG SER QL IB: PRESENT
B BURGDOR41KD IGM SER QL IB: PRESENT
B BURGDOR45KD IGG SER QL IB: PRESENT
B BURGDOR58KD IGG SER QL IB: PRESENT
B BURGDOR66KD IGG SER QL IB: PRESENT
B BURGDOR93KD IGG SER QL IB: PRESENT
B MICROTI IGG TITR SER: NORMAL {TITER}
B MICROTI IGM TITR SER: NORMAL {TITER}
M PNEUMO IGG SER IA-ACNC: 794 U/ML (ref 0–99)
M PNEUMO IGM SER IA-ACNC: <770 U/ML (ref 0–769)

## 2021-01-20 NOTE — UTILIZATION REVIEW
Fatoumata Gonsales DO   Physician   Hospitalist   Discharge Summary      Signed   Date of Service:  2021 12:21 PM               Signed             Show:Clear all  [x]Manual[x]Template[]Copied    Added by:  [x]Elena Ferreira MD[x]Brooks Choudhary, Maksim Vargas MD    []Anupama for details        Discharge- Cora Rodriguez 1975, 54 y o  male MRN: [id number]     Unit/Bed#: 413-01 Encounter: [id number]     Primary Care Provider: No primary care provider on file  Date and time admitted to hospital: 2021  2:12 PM           * SIRS (systemic inflammatory response syndrome) ([facility] [address] )  Assessment & Plan  On admission met SIRS criteria with HR and RR  Sirs present on admission, likely secondary to volume depletion, possible alcohol induced gastritis     No evidence of specific infection  SIRS Resolved     Nausea and vomiting  Assessment & Plan  Most likely secondary to ETOH and suspected alcohol-induced gastritis     Discharge on Protonix 40 mg p o  Twice daily times 30 days    Alcohol cessation/abstinence advised            Alcohol abuse  Assessment & Plan  Patient states he began heavily drinking around 2020 to combat headaches  He consumes about 1/5 of whiskey, sometimes more daily   Librium taper completed     Essential hypertension  Assessment & Plan  Resume blood pressure medication at discharge, lisinopril increased to 40 mg daily        Other headache syndrome  Assessment & Plan  Patient reports ongoing mild headache, ambulatory referral to Neurology requested      Elevated liver function tests  Assessment & Plan  Likely due to alcohol abuse     Follow-up Lyme testing                 Code Status: Level 1 - Full Code        Subjective:   No pain, overall feeling better     Objective:      Vitals:   Temp (24hrs), Av 4 °F (36 9 °C), Min:98 1 °F (36 7 °C), Max:98 8 °F (37 1 °C)     Temp:  [98 1 °F (36 7 °C)-98 8 °F (37 1 °C)] 98 2 °F (36 8 °C)  HR:  [84] 84  Resp:  [18] 18  BP: (141-160)/() 160/114  SpO2:  [94 %-97 %] 97 %  Body mass index is 32 07 kg/m²       Input and Output Summary (last 24 hours):         Intake/Output Summary (Last 24 hours) at 1/19/2021 1222  Last data filed at 1/19/2021 0815      Gross per 24 hour   Intake 360 ml   Output    Net 360 ml         Physical Exam:      Physical Exam  Vitals signs and nursing note reviewed  Constitutional:       General: He is not in acute distress  Appearance: Normal appearance  He is normal weight  He is not ill-appearing, toxic-appearing or diaphoretic  Pulmonary:      Effort: Pulmonary effort is normal    Skin:     General: Skin is warm  Neurological:      General: No focal deficit present  Mental Status: He is alert and oriented to person, place, and time  Mental status is at baseline  Psychiatric:         Mood and Affect: Mood normal          Behavior: Behavior normal          Thought Content:  Thought content normal          Judgment: Judgment normal          Additional Data:      Labs:          Results from last 7 days   Lab Units 01/19/21  0616   WBC Thousand/uL 4 06*   HEMOGLOBIN g/dL 14 0   HEMATOCRIT % 42 6   PLATELETS Thousands/uL 159   NEUTROS PCT % 54   LYMPHS PCT % 31   MONOS PCT % 10   EOS PCT % 2           Results from last 7 days   Lab Units 01/19/21  0616   POTASSIUM mmol/L 3 5   CHLORIDE mmol/L 101   CO2 mmol/L 26   BUN mg/dL 10   CREATININE mg/dL 0 78   CALCIUM mg/dL 8 1*   ALK PHOS U/L 72   ALT U/L 112*   AST U/L 134*           Results from last 7 days   Lab Units 01/19/21  0616   INR   1 03         Total time spent on discharge 35 minutes

## 2021-01-21 LAB
BACTERIA BLD CULT: NORMAL
BACTERIA BLD CULT: NORMAL

## 2021-01-29 ENCOUNTER — TELEPHONE (OUTPATIENT)
Dept: NEUROLOGY | Facility: CLINIC | Age: 46
End: 2021-01-29

## 2021-03-08 ENCOUNTER — OFFICE VISIT (OUTPATIENT)
Dept: URGENT CARE | Facility: CLINIC | Age: 46
End: 2021-03-08
Payer: COMMERCIAL

## 2021-03-08 VITALS
OXYGEN SATURATION: 97 % | HEART RATE: 148 BPM | DIASTOLIC BLOOD PRESSURE: 100 MMHG | TEMPERATURE: 98 F | SYSTOLIC BLOOD PRESSURE: 150 MMHG | BODY MASS INDEX: 31.36 KG/M2 | WEIGHT: 224 LBS | HEIGHT: 71 IN | RESPIRATION RATE: 24 BRPM

## 2021-03-08 DIAGNOSIS — B02.9 HERPES ZOSTER WITHOUT COMPLICATION: Primary | ICD-10-CM

## 2021-03-08 DIAGNOSIS — F10.239 ALCOHOL WITHDRAWAL SYNDROME WITH COMPLICATION (HCC): ICD-10-CM

## 2021-03-08 PROCEDURE — 99213 OFFICE O/P EST LOW 20 MIN: CPT | Performed by: PHYSICIAN ASSISTANT

## 2021-03-08 PROCEDURE — 99283 EMERGENCY DEPT VISIT LOW MDM: CPT | Performed by: PHYSICIAN ASSISTANT

## 2021-03-08 PROCEDURE — G0382 LEV 3 HOSP TYPE B ED VISIT: HCPCS | Performed by: PHYSICIAN ASSISTANT

## 2021-03-08 RX ORDER — VALACYCLOVIR HYDROCHLORIDE 1 G/1
1000 TABLET, FILM COATED ORAL 3 TIMES DAILY
Qty: 21 TABLET | Refills: 0 | Status: SHIPPED | OUTPATIENT
Start: 2021-03-08 | End: 2021-03-15

## 2021-03-08 NOTE — PROGRESS NOTES
[facility]        NAME: Deepika Javed is a 54 y o  male  : 1975    MRN: [id number]  DATE: 2021  TIME: 2:31 PM    Assessment and Plan   Herpes zoster without complication [W71 0]  1  Herpes zoster without complication  valACYclovir (VALTREX) 1,000 mg tablet   2  Alcohol withdrawal syndrome with complication [facility])           Patient Instructions       Follow up with PCP in 3-5 days  Proceed to  ER if symptoms worsen  Chief Complaint     Chief Complaint   Patient presents with    Rash     painful rash upper left back          History of Present Illness         Patient presents a painful rash on the left side his back which started 2-3 days ago  The rash is now spreading to the front  He has a history of chickenpox as a child  Patient also stopped drinking alcohol 2 days ago and is aware he is going through alcohol withdrawal   Patient was advised he should be seen in the emergency room for his symptoms  Patient is very reluctant to go to the ER as he does not non to his 15year-old son to know what is going on  Review of Systems   Review of Systems   Constitutional: Negative for chills and fever  Cardiovascular: Positive for palpitations  Negative for chest pain  Gastrointestinal: Positive for diarrhea  Skin: Positive for rash           Current Medications       Current Outpatient Medications:     albuterol (PROVENTIL HFA,VENTOLIN HFA) 90 mcg/act inhaler, Inhale 2 puffs every 6 (six) hours as needed for wheezing, Disp: 1 Inhaler, Rfl: 0    lisinopril (ZESTRIL) 40 mg tablet, Take 1 tablet (40 mg total) by mouth daily, Disp: 30 tablet, Rfl: 0    bisoprolol (ZEBETA) 5 mg tablet, Take 1 tablet (5 mg total) by mouth daily, Disp: 30 tablet, Rfl: 0    Butalbital-APAP-Caffeine (Fioricet) -40 MG CAPS, Take 1 tablet by mouth 2 (two) times a day as needed (headache) (Patient not taking: Reported on 3/8/2021), Disp: 60 capsule, Rfl: 0    nicotine (NICODERM CQ) 21 mg/24 hr TD 24 hr patch, Place 1 patch on the skin daily (Patient not taking: Reported on 3/8/2021), Disp: 28 patch, Rfl: 0    pantoprazole (PROTONIX) 40 mg tablet, Take 1 tablet (40 mg total) by mouth 2 (two) times a day, Disp: 60 tablet, Rfl: 0    valACYclovir (VALTREX) 1,000 mg tablet, Take 1 tablet (1,000 mg total) by mouth 3 (three) times a day for 7 days, Disp: 21 tablet, Rfl: 0    Current Allergies     Allergies as of 03/08/2021    (No Known Allergies)            The following portions of the patient's history were reviewed and updated as appropriate: allergies, current medications, past family history, past medical history, past social history, past surgical history and problem list      Past Medical History:   Diagnosis Date    Biceps tendon tear     Carpal tunnel syndrome     Chronic pain     back    Hypertension     Lyme disease        Past Surgical History:   Procedure Laterality Date    CARPAL TUNNEL RELEASE      ROTATOR CUFF REPAIR      ULNAR NERVE REPAIR         No family history on file  Medications have been verified  Objective   /100 (BP Location: Right arm, Patient Position: Sitting, Cuff Size: Adult)   Pulse (!) 148   Temp 98 °F (36 7 °C)   Resp (!) 24   Ht 5' 11" (1 803 m)   Wt 102 kg (224 lb)   SpO2 97%   BMI 31 24 kg/m²   No LMP for male patient  Physical Exam     Physical Exam  Vitals signs and nursing note reviewed  Constitutional:       Appearance: Normal appearance  HENT:      Head: Normocephalic and atraumatic  Cardiovascular:      Rate and Rhythm: Regular rhythm  Tachycardia present  Pulmonary:      Effort: Pulmonary effort is normal    Skin:     General: Skin is warm  Comments: Blotchy maculopapular rash left mid back which radiates to the anterior left chest   There is several pustules and numerous vesicles  Rash is consistent with shingles  Neurological:      Mental Status: He is alert

## 2021-03-08 NOTE — PATIENT INSTRUCTIONS
Highly suggest going to the emergency room for alcohol withdrawal       Shingles   AMBULATORY CARE:   Shingles  is a painful rash  Shingles is caused by the same virus that causes chickenpox (varicella-zoster)  After you get chickenpox, the virus stays in your body for several years without causing any symptoms  Shingles occurs when the virus becomes active again  The active virus travels along a nerve to your skin and causes a rash  Common signs and symptoms include the following:  Shingles often starts with pain in the back, chest, neck, or face  A rash then develops in the same area  The rash is usually found on only one side of the body  The rash may feel itchy or painful  It starts as red dots that become blisters filled with fluid  The blisters usually grow bigger, become filled with pus, and then crust over after a few days  You may also have any of the following:  · Fatigue and muscle weakness    · Pain when your skin is lightly touched    · Headache    · Fever    · Eye pain when exposed to light    Call your local emergency number (911 in the [address] [facility] [facility]) if:   · You have trouble moving your arms, legs, or face  · You become confused, or have difficulty speaking  · You have a seizure  Seek care immediately if:   · You have weakness in an arm or leg  · You have dizziness, a severe headache, or hearing or vision loss  · You have painful, red, warm skin around the blisters, or the blisters drain pus  · Your neck is stiff or you have trouble moving it  Call your doctor if:   · You feel weak or have a headache  · You have a cough, chills, or a fever  · You have abdominal pain or nausea, or you are vomiting  · Your rash becomes more itchy or painful  · Your rash spreads to other parts of your body  · Your pain worsens and does not go away even after you take medicine  · You have questions or concerns about your condition or care  Medicines:   You may need any of the following:  · Antiviral medicine  helps decrease symptoms and healing time  They may also decrease your risk of developing nerve pain  You will need to start taking them within 3 days of the start of symptoms to prevent nerve pain  · Pain medicine  may be prescribed or suggested by your healthcare provider  You may need NSAIDs, acetaminophen, or opioid medicine depending on how much pain you are in  Do not wait until the pain is severe before you take more pain medicine  · Topical anesthetics  are used to numb the skin and decrease pain  They can be a cream, gel, spray, or patch  · Anticonvulsants  decrease nerve pain and may help you sleep at night  · Antidepressants  may be used to decrease nerve pain  Self-care:  Keep your rash clean and dry  Cover your rash with a bandage or clothing  Do not use bandages that stick to your skin  The sticky part may irritate your skin and make your rash last longer  Prevent the spread of shingles:       · Wash your hands often  Wash your hands several times each day  Wash after you use the bathroom, change a child's diaper, and before you prepare or eat food  Use soap and water every time  Rub your soapy hands together, lacing your fingers  Wash the front and back of your hands, and in between your fingers  Use the fingers of one hand to scrub under the fingernails of the other hand  Wash for at least 20 seconds  Rinse with warm, running water for several seconds  Then dry your hands with a clean towel or paper towel  Use hand  that contains alcohol if soap and water are not available  Do not touch your eyes, nose, or mouth without washing your hands first          · Cover a sneeze or cough  Use a tissue that covers your mouth and nose  Throw the tissue away in a trash can right away  Use the bend of your arm if a tissue is not available  Wash your hands well with soap and water or use a hand   · Stay away from others while you are sick  Avoid crowds as much as possible  · Ask about vaccines you may need  Talk to your healthcare provider about your vaccine history  He or she will tell you which vaccines you need, and when to get them  Prevent shingles or another shingles outbreak:  A vaccine may be given to help prevent shingles  You can get the vaccine even if you already had shingles  The vaccine can help prevent a future outbreak  If you do get shingles again, the vaccine can keep it from becoming severe  The vaccine comes in 2 forms  Your healthcare provider will tell you which form is right for you  The decision is based on your age and any medical conditions you have  A 2-dose vaccine is usually given to adults 48 years or older  A 1-dose vaccine may be given to adults 61 years or older  For more information:   · Centers for Disease Control and Prevention  [address] , [address]  Phone: 3- 154 - 5915777  Phone: 0- 113 - 6665006  Web Address: [employer] com     Follow up with your doctor as directed:  Write down your questions so you remember to ask them during your visits  © Copyright [address] [place] [address] Information is for End User's use only and may not be sold, redistributed or otherwise used for commercial purposes  All illustrations and images included in CareNotes® are the copyrighted property of A D A M , Inc  or Kaushik Rice  The above information is an  only  It is not intended as medical advice for individual conditions or treatments  Talk to your doctor, nurse or pharmacist before following any medical regimen to see if it is safe and effective for you

## 2021-03-22 ENCOUNTER — OFFICE VISIT (OUTPATIENT)
Dept: FAMILY MEDICINE CLINIC | Facility: CLINIC | Age: 46
End: 2021-03-22
Payer: COMMERCIAL

## 2021-03-22 VITALS
TEMPERATURE: 97.2 F | OXYGEN SATURATION: 96 % | SYSTOLIC BLOOD PRESSURE: 158 MMHG | HEIGHT: 71 IN | DIASTOLIC BLOOD PRESSURE: 96 MMHG | HEART RATE: 119 BPM | WEIGHT: 231.4 LBS | BODY MASS INDEX: 32.4 KG/M2

## 2021-03-22 DIAGNOSIS — F10.10 ALCOHOL ABUSE: ICD-10-CM

## 2021-03-22 DIAGNOSIS — B02.9 HERPES ZOSTER WITHOUT COMPLICATION: ICD-10-CM

## 2021-03-22 DIAGNOSIS — A69.20 LYME DISEASE: Primary | ICD-10-CM

## 2021-03-22 DIAGNOSIS — I10 ESSENTIAL HYPERTENSION: ICD-10-CM

## 2021-03-22 PROCEDURE — 3725F SCREEN DEPRESSION PERFORMED: CPT | Performed by: PHYSICIAN ASSISTANT

## 2021-03-22 PROCEDURE — 3008F BODY MASS INDEX DOCD: CPT | Performed by: PHYSICIAN ASSISTANT

## 2021-03-22 PROCEDURE — 99203 OFFICE O/P NEW LOW 30 MIN: CPT | Performed by: PHYSICIAN ASSISTANT

## 2021-03-22 RX ORDER — DOXYCYCLINE HYCLATE 100 MG
100 TABLET ORAL 2 TIMES DAILY
Qty: 42 TABLET | Refills: 0 | Status: SHIPPED | OUTPATIENT
Start: 2021-03-22 | End: 2021-04-12

## 2021-03-22 NOTE — PROGRESS NOTES
Assessment/Plan:    Problem List Items Addressed This Visit        Cardiovascular and Mediastinum    Essential hypertension    Relevant Orders    Comprehensive metabolic panel    CBC and differential       Other    Alcohol abuse    Relevant Orders    Comprehensive metabolic panel    CBC and differential      Other Visit Diagnoses     Lyme disease    -  Primary    Relevant Medications    doxycycline hyclate (VIBRA-TABS) 100 mg tablet    Herpes zoster without complication               Diagnoses and all orders for this visit:    Lyme disease  -     doxycycline hyclate (VIBRA-TABS) 100 mg tablet; Take 1 tablet (100 mg total) by mouth 2 (two) times a day for 21 days    Herpes zoster without complication    Essential hypertension  -     Comprehensive metabolic panel; Future  -     CBC and differential; Future    Alcohol abuse  -     Comprehensive metabolic panel; Future  -     CBC and differential; Future        Script for doxycycline for lyme disease treatment  Highly recommended that he be evaluated in the ER if he develops any worsening withdrawal symptoms  I recommended that he accept a referral to an outpatient treatment facility, but patient declined at this time  I emphasized that he go to the ER with any worsening symptoms  He verbalized understanding  Herpes zoster rash is healing well  Recommended that he continue to monitor and notify us of any new or worsening symptoms  Subjective:      Patient ID: Carolina Mariano is a 54 y o  male  Fayette Stephen is a 54year old male who is new today to get established  He was referred to us by the Urgent Care  He was recently treated there for herpes zoster  He admits that the pain has resolved and the rash is improving as well  It is no longer painful  He denies any drainage from the lesions  He is also here to get treated for Lyme disease  He was seen in the hospital in January   They called him after his hospitalization and told him to follow-up with a PCP because he needed to be treated for Lyme  He also admits to being an alcoholic and currently going through withdrawal  He drinks about half of a bottle of whiskey per day  He stopped drinking about one day ago  He does not wish to be referred to treatment at this time  He also refuses going back to the hospital at this time  He denies any other concerns at this time  The following portions of the patient's history were reviewed and updated as appropriate:   He has a past medical history of Biceps tendon tear, Carpal tunnel syndrome, Chronic pain, Hypertension, and Lyme disease  ,  does not have any pertinent problems on file  ,   has a past surgical history that includes Ulnar nerve repair; Carpal tunnel release; and Rotator cuff repair  ,  family history includes Multiple sclerosis in his mother; No Known Problems in his father  ,   reports that he has been smoking  He has been smoking about 1 00 pack per day for the past 0 00 years  He has never used smokeless tobacco  He reports current alcohol use  No history on file for drug ,  has No Known Allergies     Current Outpatient Medications   Medication Sig Dispense Refill    bisoprolol (ZEBETA) 5 mg tablet Take 1 tablet (5 mg total) by mouth daily 30 tablet 0    lisinopril (ZESTRIL) 40 mg tablet Take 1 tablet (40 mg total) by mouth daily 30 tablet 0    albuterol (PROVENTIL HFA,VENTOLIN HFA) 90 mcg/act inhaler Inhale 2 puffs every 6 (six) hours as needed for wheezing (Patient not taking: Reported on 3/22/2021) 1 Inhaler 0    Butalbital-APAP-Caffeine (Fioricet) -40 MG CAPS Take 1 tablet by mouth 2 (two) times a day as needed (headache) (Patient not taking: Reported on 3/8/2021) 60 capsule 0    doxycycline hyclate (VIBRA-TABS) 100 mg tablet Take 1 tablet (100 mg total) by mouth 2 (two) times a day for 21 days 42 tablet 0    nicotine (NICODERM CQ) 21 mg/24 hr TD 24 hr patch Place 1 patch on the skin daily (Patient not taking: Reported on 3/8/2021) 28 patch 0    pantoprazole (PROTONIX) 40 mg tablet Take 1 tablet (40 mg total) by mouth 2 (two) times a day 60 tablet 0    valACYclovir (VALTREX) 1,000 mg tablet Take 1 tablet (1,000 mg total) by mouth 3 (three) times a day for 7 days 21 tablet 0     No current facility-administered medications for this visit  Review of Systems   Constitutional: Positive for diaphoresis  Negative for chills, fatigue and fever  HENT: Negative for congestion, ear pain, postnasal drip, rhinorrhea, sneezing, sore throat and trouble swallowing  Eyes: Negative for pain and visual disturbance  Respiratory: Negative for apnea, cough, shortness of breath and wheezing  Cardiovascular: Negative for chest pain and palpitations  Gastrointestinal: Positive for diarrhea  Negative for abdominal pain, constipation, nausea and vomiting  Genitourinary: Negative for dysuria and hematuria  Musculoskeletal: Negative for arthralgias, gait problem and myalgias  Skin: Positive for rash (healing rash on left trunk)  Neurological: Negative for dizziness, syncope, weakness, light-headedness, numbness and headaches  Psychiatric/Behavioral: Positive for agitation  Negative for suicidal ideas  The patient is nervous/anxious  Objective:  Vitals:    03/22/21 0928 03/22/21 0945   BP: (!) 196/120 158/96   Pulse: (!) 119    Temp: (!) 97 2 °F (36 2 °C)    SpO2: 96%    Weight: 105 kg (231 lb 6 4 oz)    Height: 5' 11" (1 803 m)      Body mass index is 32 27 kg/m²  Physical Exam  Vitals signs and nursing note reviewed  Constitutional:       Appearance: He is well-developed  He is diaphoretic  HENT:      Head: Normocephalic and atraumatic  Right Ear: Ear canal and external ear normal       Left Ear: External ear normal       Nose: Nose normal    Eyes:      Extraocular Movements: Extraocular movements intact  Neck:      Musculoskeletal: Normal range of motion and neck supple  Cardiovascular:      Rate and Rhythm: Regular rhythm  Tachycardia present  Heart sounds: Normal heart sounds  No murmur  No friction rub  No gallop  Pulmonary:      Effort: Pulmonary effort is normal  No respiratory distress  Breath sounds: Normal breath sounds  No wheezing or rales  Musculoskeletal: Normal range of motion  Lymphadenopathy:      Cervical: No cervical adenopathy  Skin:     General: Skin is warm  Neurological:      Mental Status: He is alert and oriented to person, place, and time  Psychiatric:         Mood and Affect: Mood is anxious  Mood is not depressed  Behavior: Behavior normal  Behavior is cooperative  Thought Content: Thought content normal  Thought content does not include homicidal or suicidal ideation  Thought content does not include homicidal or suicidal plan           Judgment: Judgment normal

## 2021-08-11 ENCOUNTER — OFFICE VISIT (OUTPATIENT)
Dept: URGENT CARE | Facility: CLINIC | Age: 46
End: 2021-08-11
Payer: COMMERCIAL

## 2021-08-11 VITALS — TEMPERATURE: 98.6 F | RESPIRATION RATE: 20 BRPM | HEART RATE: 122 BPM | OXYGEN SATURATION: 96 %

## 2021-08-11 DIAGNOSIS — R50.9 FEVER, UNSPECIFIED FEVER CAUSE: ICD-10-CM

## 2021-08-11 DIAGNOSIS — R05.9 COUGH: Primary | ICD-10-CM

## 2021-08-11 PROCEDURE — 99213 OFFICE O/P EST LOW 20 MIN: CPT | Performed by: PHYSICIAN ASSISTANT

## 2021-08-11 PROCEDURE — U0005 INFEC AGEN DETEC AMPLI PROBE: HCPCS | Performed by: PHYSICIAN ASSISTANT

## 2021-08-11 PROCEDURE — U0003 INFECTIOUS AGENT DETECTION BY NUCLEIC ACID (DNA OR RNA); SEVERE ACUTE RESPIRATORY SYNDROME CORONAVIRUS 2 (SARS-COV-2) (CORONAVIRUS DISEASE [COVID-19]), AMPLIFIED PROBE TECHNIQUE, MAKING USE OF HIGH THROUGHPUT TECHNOLOGIES AS DESCRIBED BY CMS-2020-01-R: HCPCS | Performed by: PHYSICIAN ASSISTANT

## 2021-08-11 NOTE — PATIENT INSTRUCTIONS

## 2021-08-11 NOTE — PROGRESS NOTES
[facility]        NAME: Mai Curran is a 51 y o  male  : 1975    MRN: [id number]  DATE: 2021  TIME: 12:20 PM    Assessment and Plan   Cough [R05]  1  Cough  Novel Coronavirus (Covid-19),PCR [facility] - Office Collection   2  Fever, unspecified fever cause  Novel Coronavirus (Covid-19),PCR [facility] - Office Collection         Patient Instructions       Follow up with PCP in 3-5 days  Proceed to  ER if symptoms worsen  Chief Complaint     Chief Complaint   Patient presents with    Sore Throat     started yesterday    Cough     started yesterday    Chills     started yesterday         History of Present Illness       Patient was recently in [facility] and yesterday started with nasal congestion fever chills cough runny stuffy nose and a sore throat  No known exposure to COVID-19  He is not vaccinated  Patient states he felt features but did not take his temperature  Review of Systems   Review of Systems   Constitutional: Positive for chills and fatigue  Negative for fever  HENT: Positive for congestion, rhinorrhea and sore throat  Respiratory: Positive for cough  Negative for shortness of breath  Gastrointestinal: Negative for diarrhea, nausea and vomiting  Musculoskeletal: Negative for myalgias  Skin: Negative for rash  Neurological: Negative for light-headedness and headaches           Current Medications       Current Outpatient Medications:     albuterol (PROVENTIL HFA,VENTOLIN HFA) 90 mcg/act inhaler, Inhale 2 puffs every 6 (six) hours as needed for wheezing (Patient not taking: Reported on 3/22/2021), Disp: 1 Inhaler, Rfl: 0    bisoprolol (ZEBETA) 5 mg tablet, Take 1 tablet (5 mg total) by mouth daily, Disp: 30 tablet, Rfl: 0    Butalbital-APAP-Caffeine (Fioricet) -40 MG CAPS, Take 1 tablet by mouth 2 (two) times a day as needed (headache) (Patient not taking: Reported on 3/8/2021), Disp: 60 capsule, Rfl: 0    lisinopril (ZESTRIL) 40 mg tablet, Take 1 tablet (40 mg total) by mouth daily, Disp: 30 tablet, Rfl: 0    nicotine (NICODERM CQ) 21 mg/24 hr TD 24 hr patch, Place 1 patch on the skin daily (Patient not taking: Reported on 3/8/2021), Disp: 28 patch, Rfl: 0    pantoprazole (PROTONIX) 40 mg tablet, Take 1 tablet (40 mg total) by mouth 2 (two) times a day, Disp: 60 tablet, Rfl: 0    valACYclovir (VALTREX) 1,000 mg tablet, Take 1 tablet (1,000 mg total) by mouth 3 (three) times a day for 7 days, Disp: 21 tablet, Rfl: 0    Current Allergies     Allergies as of 08/11/2021    (No Known Allergies)            The following portions of the patient's history were reviewed and updated as appropriate: allergies, current medications, past family history, past medical history, past social history, past surgical history and problem list      Past Medical History:   Diagnosis Date    Biceps tendon tear     Carpal tunnel syndrome     Chronic pain     back    Hypertension     Lyme disease        Past Surgical History:   Procedure Laterality Date    CARPAL TUNNEL RELEASE      ROTATOR CUFF REPAIR      ULNAR NERVE REPAIR         Family History   Problem Relation Age of Onset    Multiple sclerosis Mother     No Known Problems Father          Medications have been verified  Objective   Pulse (!) 122   Temp 98 6 °F (37 °C)   Resp 20   SpO2 96%   No LMP for male patient  Physical Exam     Physical Exam  Vitals and nursing note reviewed  Constitutional:       Appearance: He is well-developed  HENT:      Head: Normocephalic and atraumatic  Right Ear: Tympanic membrane normal       Left Ear: Tympanic membrane normal       Mouth/Throat:      Mouth: Mucous membranes are moist       Pharynx: Oropharynx is clear  Eyes:      Conjunctiva/sclera: Conjunctivae normal    Cardiovascular:      Rate and Rhythm: Normal rate and regular rhythm  Heart sounds: Normal heart sounds     Pulmonary:      Effort: Pulmonary effort is normal       Breath sounds: Normal breath sounds  Musculoskeletal:      Cervical back: Neck supple  Lymphadenopathy:      Cervical: No cervical adenopathy  Skin:     General: Skin is warm  Neurological:      Mental Status: He is alert

## 2021-08-12 LAB — SARS-COV-2 RNA RESP QL NAA+PROBE: NEGATIVE

## 2022-09-06 ENCOUNTER — HOSPITAL ENCOUNTER (EMERGENCY)
Facility: HOSPITAL | Age: 47
Discharge: HOME | End: 2022-09-06
Attending: EMERGENCY MEDICINE
Payer: COMMERCIAL

## 2022-09-06 ENCOUNTER — APPOINTMENT (EMERGENCY)
Dept: CT IMAGING | Facility: HOSPITAL | Age: 47
End: 2022-09-06
Payer: COMMERCIAL

## 2022-09-06 VITALS
RESPIRATION RATE: 20 BRPM | WEIGHT: 200 LBS | BODY MASS INDEX: 28 KG/M2 | DIASTOLIC BLOOD PRESSURE: 105 MMHG | HEART RATE: 78 BPM | SYSTOLIC BLOOD PRESSURE: 170 MMHG | HEIGHT: 71 IN | TEMPERATURE: 99.4 F | OXYGEN SATURATION: 98 %

## 2022-09-06 DIAGNOSIS — R10.9 RIGHT FLANK PAIN: Primary | ICD-10-CM

## 2022-09-06 DIAGNOSIS — N20.0 KIDNEY STONE: ICD-10-CM

## 2022-09-06 DIAGNOSIS — N17.9 AKI (ACUTE KIDNEY INJURY) (HCC): ICD-10-CM

## 2022-09-06 LAB
ALBUMIN SERPL BCP-MCNC: 3.6 G/DL (ref 3.5–5)
ALP SERPL-CCNC: 48 U/L (ref 46–116)
ALT SERPL W P-5'-P-CCNC: 53 U/L (ref 12–78)
ANION GAP SERPL CALCULATED.3IONS-SCNC: 9 MMOL/L (ref 4–13)
AST SERPL W P-5'-P-CCNC: 31 U/L (ref 5–45)
BACTERIA UR QL AUTO: ABNORMAL /HPF
BASOPHILS # BLD AUTO: 0.05 THOUSANDS/ΜL (ref 0–0.1)
BASOPHILS NFR BLD AUTO: 1 % (ref 0–1)
BILIRUB SERPL-MCNC: 0.32 MG/DL (ref 0.2–1)
BILIRUB UR QL STRIP: NEGATIVE
BUN SERPL-MCNC: 22 MG/DL (ref 5–25)
CALCIUM SERPL-MCNC: 9.2 MG/DL (ref 8.3–10.1)
CAOX CRY URNS QL MICRO: ABNORMAL /HPF
CHLORIDE SERPL-SCNC: 106 MMOL/L (ref 96–108)
CLARITY UR: CLEAR
CO2 SERPL-SCNC: 23 MMOL/L (ref 21–32)
COLOR UR: YELLOW
CREAT SERPL-MCNC: 1.44 MG/DL (ref 0.6–1.3)
EOSINOPHIL # BLD AUTO: 0.1 THOUSAND/ΜL (ref 0–0.61)
EOSINOPHIL NFR BLD AUTO: 1 % (ref 0–6)
ERYTHROCYTE [DISTWIDTH] IN BLOOD BY AUTOMATED COUNT: 13.9 % (ref 11.6–15.1)
GFR SERPL CREATININE-BSD FRML MDRD: 57 ML/MIN/1.73SQ M
GLUCOSE SERPL-MCNC: 214 MG/DL (ref 65–140)
GLUCOSE UR STRIP-MCNC: NEGATIVE MG/DL
HCT VFR BLD AUTO: 44.9 % (ref 36.5–49.3)
HGB BLD-MCNC: 14.8 G/DL (ref 12–17)
HGB UR QL STRIP.AUTO: ABNORMAL
HYALINE CASTS #/AREA URNS LPF: ABNORMAL /LPF
IMM GRANULOCYTES # BLD AUTO: 0.04 THOUSAND/UL (ref 0–0.2)
IMM GRANULOCYTES NFR BLD AUTO: 0 % (ref 0–2)
KETONES UR STRIP-MCNC: NEGATIVE MG/DL
LEUKOCYTE ESTERASE UR QL STRIP: NEGATIVE
LIPASE SERPL-CCNC: 358 U/L (ref 73–393)
LYMPHOCYTES # BLD AUTO: 1.3 THOUSANDS/ΜL (ref 0.6–4.47)
LYMPHOCYTES NFR BLD AUTO: 13 % (ref 14–44)
MCH RBC QN AUTO: 32.1 PG (ref 26.8–34.3)
MCHC RBC AUTO-ENTMCNC: 33 G/DL (ref 31.4–37.4)
MCV RBC AUTO: 97 FL (ref 82–98)
MONOCYTES # BLD AUTO: 0.74 THOUSAND/ΜL (ref 0.17–1.22)
MONOCYTES NFR BLD AUTO: 8 % (ref 4–12)
MUCOUS THREADS UR QL AUTO: ABNORMAL
NEUTROPHILS # BLD AUTO: 7.46 THOUSANDS/ΜL (ref 1.85–7.62)
NEUTS SEG NFR BLD AUTO: 77 % (ref 43–75)
NITRITE UR QL STRIP: NEGATIVE
NON-SQ EPI CELLS URNS QL MICRO: ABNORMAL /HPF
NRBC BLD AUTO-RTO: 0 /100 WBCS
PH UR STRIP.AUTO: 6 [PH]
PLATELET # BLD AUTO: 191 THOUSANDS/UL (ref 149–390)
PMV BLD AUTO: 9.2 FL (ref 8.9–12.7)
POTASSIUM SERPL-SCNC: 3.9 MMOL/L (ref 3.5–5.3)
PROT SERPL-MCNC: 7.2 G/DL (ref 6.4–8.4)
PROT UR STRIP-MCNC: ABNORMAL MG/DL
RBC # BLD AUTO: 4.61 MILLION/UL (ref 3.88–5.62)
RBC #/AREA URNS AUTO: ABNORMAL /HPF
SODIUM SERPL-SCNC: 138 MMOL/L (ref 135–147)
SP GR UR STRIP.AUTO: >=1.03 (ref 1–1.03)
UROBILINOGEN UR QL STRIP.AUTO: 0.2 E.U./DL
WBC # BLD AUTO: 9.69 THOUSAND/UL (ref 4.31–10.16)
WBC #/AREA URNS AUTO: ABNORMAL /HPF

## 2022-09-06 PROCEDURE — 83690 ASSAY OF LIPASE: CPT | Performed by: EMERGENCY MEDICINE

## 2022-09-06 PROCEDURE — 85025 COMPLETE CBC W/AUTO DIFF WBC: CPT | Performed by: EMERGENCY MEDICINE

## 2022-09-06 PROCEDURE — 74176 CT ABD & PELVIS W/O CONTRAST: CPT

## 2022-09-06 PROCEDURE — G1004 CDSM NDSC: HCPCS

## 2022-09-06 PROCEDURE — 36415 COLL VENOUS BLD VENIPUNCTURE: CPT | Performed by: EMERGENCY MEDICINE

## 2022-09-06 PROCEDURE — 80053 COMPREHEN METABOLIC PANEL: CPT | Performed by: EMERGENCY MEDICINE

## 2022-09-06 PROCEDURE — 81001 URINALYSIS AUTO W/SCOPE: CPT | Performed by: EMERGENCY MEDICINE

## 2022-09-06 PROCEDURE — 99284 EMERGENCY DEPT VISIT MOD MDM: CPT

## 2022-09-06 PROCEDURE — 96374 THER/PROPH/DIAG INJ IV PUSH: CPT

## 2022-09-06 PROCEDURE — 99284 EMERGENCY DEPT VISIT MOD MDM: CPT | Performed by: EMERGENCY MEDICINE

## 2022-09-06 PROCEDURE — 96361 HYDRATE IV INFUSION ADD-ON: CPT

## 2022-09-06 RX ORDER — IBUPROFEN 400 MG/1
400 TABLET ORAL EVERY 6 HOURS PRN
Qty: 30 TABLET | Refills: 0 | Status: SHIPPED | OUTPATIENT
Start: 2022-09-06

## 2022-09-06 RX ORDER — KETOROLAC TROMETHAMINE 30 MG/ML
15 INJECTION, SOLUTION INTRAMUSCULAR; INTRAVENOUS ONCE
Status: COMPLETED | OUTPATIENT
Start: 2022-09-06 | End: 2022-09-06

## 2022-09-06 RX ADMIN — KETOROLAC TROMETHAMINE 15 MG: 30 INJECTION, SOLUTION INTRAMUSCULAR at 05:03

## 2022-09-06 RX ADMIN — SODIUM CHLORIDE 1000 ML: 0.9 INJECTION, SOLUTION INTRAVENOUS at 05:36

## 2022-09-06 NOTE — DISCHARGE INSTRUCTIONS
Please follow all return precautions  Please follow up with urology for further care  If your symptoms return and worsen, you develop fevers, chills, nausea or vomiting, please return immediately for re-evaluation  Please stay hydrated over the next few days given your renal function today  Thank you

## 2022-09-06 NOTE — ED PROVIDER NOTES
History  Chief Complaint   Patient presents with    Flank Pain     Patient states just R sided flank pain, "never had pain like this before", never had kidney stones before  Patient denies any injury to area     70-year-old male who presents for severe right flank pain  Patient states that he was sleeping, and approximately 2 hours ago he developed the right flank pain  He states that he never had pain like this in the past   Denies any fevers or chills  No nausea or vomiting or diarrhea  No abdominal pain  Patient states that he has not had any dysuria or frequency  No history of kidney stones  ROS otherwise negative  Prior to Admission Medications   Prescriptions Last Dose Informant Patient Reported? Taking?    Butalbital-APAP-Caffeine (Fioricet) -40 MG CAPS   No No   Sig: Take 1 tablet by mouth 2 (two) times a day as needed (headache)   Patient not taking: Reported on 3/8/2021   albuterol (PROVENTIL HFA,VENTOLIN HFA) 90 mcg/act inhaler   No No   Sig: Inhale 2 puffs every 6 (six) hours as needed for wheezing   Patient not taking: Reported on 3/22/2021   bisoprolol (ZEBETA) 5 mg tablet   No No   Sig: Take 1 tablet (5 mg total) by mouth daily   lisinopril (ZESTRIL) 40 mg tablet   No No   Sig: Take 1 tablet (40 mg total) by mouth daily   nicotine (NICODERM CQ) 21 mg/24 hr TD 24 hr patch   No No   Sig: Place 1 patch on the skin daily   Patient not taking: Reported on 3/8/2021   pantoprazole (PROTONIX) 40 mg tablet   No No   Sig: Take 1 tablet (40 mg total) by mouth 2 (two) times a day   valACYclovir (VALTREX) 1,000 mg tablet   No No   Sig: Take 1 tablet (1,000 mg total) by mouth 3 (three) times a day for 7 days      Facility-Administered Medications: None       Past Medical History:   Diagnosis Date    Biceps tendon tear     Carpal tunnel syndrome     Chronic pain     back    Hypertension     Lyme disease        Past Surgical History:   Procedure Laterality Date    CARPAL TUNNEL RELEASE  ROTATOR CUFF REPAIR      ULNAR NERVE REPAIR         Family History   Problem Relation Age of Onset    Multiple sclerosis Mother     No Known Problems Father      I have reviewed and agree with the history as documented  E-Cigarette/Vaping    E-Cigarette Use Never User      E-Cigarette/Vaping Substances     Social History     Tobacco Use    Smoking status: Current Every Day Smoker     Packs/day: 1 00     Years: 0 00     Pack years: 0 00    Smokeless tobacco: Never Used   Vaping Use    Vaping Use: Never used   Substance Use Topics    Alcohol use: Yes     Alcohol/week: 0 0 standard drinks    Drug use: Never       Review of Systems   Constitutional: Negative for chills, diaphoresis, fatigue and fever  HENT: Negative for congestion and sore throat  Eyes: Negative for visual disturbance  Respiratory: Negative for cough, chest tightness and shortness of breath  Cardiovascular: Negative for chest pain, palpitations and leg swelling  Gastrointestinal: Negative for abdominal distention, abdominal pain, constipation, diarrhea, nausea and vomiting  Genitourinary: Positive for flank pain  Negative for difficulty urinating and dysuria  Musculoskeletal: Negative for arthralgias and myalgias  Skin: Negative for rash  Neurological: Negative for dizziness, weakness, light-headedness, numbness and headaches  Psychiatric/Behavioral: Negative for agitation, behavioral problems and confusion  The patient is not nervous/anxious  All other systems reviewed and are negative  Physical Exam  Physical Exam  Constitutional:       Appearance: He is well-developed  HENT:      Head: Normocephalic and atraumatic  Cardiovascular:      Rate and Rhythm: Normal rate and regular rhythm  Heart sounds: Normal heart sounds  No murmur heard  Pulmonary:      Effort: Pulmonary effort is normal  No respiratory distress  Breath sounds: Normal breath sounds     Abdominal:      General: Bowel sounds are normal  There is no distension  Palpations: Abdomen is soft  Tenderness: There is no abdominal tenderness  There is right CVA tenderness  Musculoskeletal:         General: No deformity  Skin:     General: Skin is warm  Findings: No rash  Neurological:      Mental Status: He is alert and oriented to person, place, and time  Psychiatric:         Behavior: Behavior normal          Thought Content:  Thought content normal          Judgment: Judgment normal          Vital Signs  ED Triage Vitals [09/06/22 0451]   Temperature Pulse Respirations Blood Pressure SpO2   99 4 °F (37 4 °C) 78 20 (!) 170/105 98 %      Temp Source Heart Rate Source Patient Position - Orthostatic VS BP Location FiO2 (%)   Temporal Monitor Lying Left arm --      Pain Score       7           Vitals:    09/06/22 0451   BP: (!) 170/105   Pulse: 78   Patient Position - Orthostatic VS: Lying         Visual Acuity      ED Medications  Medications   sodium chloride 0 9 % bolus 1,000 mL (1,000 mL Intravenous New Bag 9/6/22 0536)   ketorolac (TORADOL) injection 15 mg (15 mg Intravenous Given 9/6/22 0503)       Diagnostic Studies  Results Reviewed     Procedure Component Value Units Date/Time    Urine Microscopic [[id number]]  (Abnormal) Collected: 09/06/22 0526    Lab Status: Final result Specimen: Urine, Clean Catch Updated: 09/06/22 0543     RBC, UA 4-10 /hpf      WBC, UA 1-2 /hpf      Epithelial Cells Occasional /hpf      Bacteria, UA Occasional /hpf      Hyaline Casts, UA 10-20 /lpf      Ca Oxalate Sharon, UA Occasional /hpf      MUCUS THREADS Occasional    UA w Reflex to Microscopic w Reflex to Culture [[id number]]  (Abnormal) Collected: 09/06/22 0526    Lab Status: Final result Specimen: Urine, Clean Catch Updated: 09/06/22 0533     Color, UA Yellow     Clarity, UA Clear     Specific Gravity, UA >=1 030     pH, UA 6 0     Leukocytes, UA Negative     Nitrite, UA Negative     Protein, UA Trace mg/dl      Glucose, UA Negative mg/dl Ketones, UA Negative mg/dl      Urobilinogen, UA 0 2 E U /dl      Bilirubin, UA Negative     Occult Blood, UA Trace-Intact    Comprehensive metabolic panel [[id number]]  (Abnormal) Collected: 09/06/22 0500    Lab Status: Final result Specimen: Blood from Arm, Right Updated: 09/06/22 0527     Sodium 138 mmol/L      Potassium 3 9 mmol/L      Chloride 106 mmol/L      CO2 23 mmol/L      ANION GAP 9 mmol/L      BUN 22 mg/dL      Creatinine 1 44 mg/dL      Glucose 214 mg/dL      Calcium 9 2 mg/dL      AST 31 U/L      ALT 53 U/L      Alkaline Phosphatase 48 U/L      Total Protein 7 2 g/dL      Albumin 3 6 g/dL      Total Bilirubin 0 32 mg/dL      eGFR 57 ml/min/1 73sq m     Narrative:      [place] guidelines for Chronic Kidney Disease (CKD):     Stage 1 with normal or high GFR (GFR > 90 mL/min/1 73 square meters)    Stage 2 Mild CKD (GFR = 60-89 mL/min/1 73 square meters)    Stage 3A Moderate CKD (GFR = 45-59 mL/min/1 73 square meters)    Stage 3B Moderate CKD (GFR = 30-44 mL/min/1 73 square meters)    Stage 4 Severe CKD (GFR = 15-29 mL/min/1 73 square meters)    Stage 5 End Stage CKD (GFR <15 mL/min/1 73 square meters)  Note: GFR calculation is accurate only with a steady state creatinine    Lipase [[id number]]  (Normal) Collected: 09/06/22 0500    Lab Status: Final result Specimen: Blood from Arm, Right Updated: 09/06/22 0527     Lipase 358 u/L     CBC and differential [[id number]]  (Abnormal) Collected: 09/06/22 0500    Lab Status: Final result Specimen: Blood from Arm, Right Updated: 09/06/22 0511     WBC 9 69 Thousand/uL      RBC 4 61 Million/uL      Hemoglobin 14 8 g/dL      Hematocrit 44 9 %      MCV 97 fL      MCH 32 1 pg      MCHC 33 0 g/dL      RDW 13 9 %      MPV 9 2 fL      Platelets 447 Thousands/uL      nRBC 0 /100 WBCs      Neutrophils Relative 77 %      Immat GRANS % 0 %      Lymphocytes Relative 13 %      Monocytes Relative 8 %      Eosinophils Relative 1 %      Basophils Relative 1 %      Neutrophils Absolute 7 46 Thousands/µL      Immature Grans Absolute 0 04 Thousand/uL      Lymphocytes Absolute 1 30 Thousands/µL      Monocytes Absolute 0 74 Thousand/µL      Eosinophils Absolute 0 10 Thousand/µL      Basophils Absolute 0 05 Thousands/µL                  CT renal stone study abdomen pelvis wo contrast   Final Result by Nessa Mclaughlin MD (09/06 0994)      Moderate right hydroureter nephrosis with perinephric stranding and edema, however no urolithiasis identified  Findings might represent sequela of a recently passed calculus  Workstation performed: [id number]                    Procedures  Procedures         ED Course  ED Course as of 09/06/22 0609   Tue Sep 06, 2022   0604 Patient sleeping comfortably in no distress  States he feels much better  MDM  Number of Diagnoses or Management Options  [facility]  Kidney stone  Right flank pain  Diagnosis management comments: Patient's presentation is very concerning for renal stone  Will obtain basic labs, urinalysis, CT renal study  Toradol given  Will reassess  CT scan demonstrates evidence of recent kidney stone, patient does have blood in the urine  His labs do suggest NAHOMY, his last lab work was 1 year ago  However, in the context of recent stone that is not visualized on scan, this should resolve, and patient is not nauseous and states he can readily tolerate p o   I advised him to stay hydrated over the next several days and follow up with his PCP as well as Urology  He is agreeable with this plan  Discharged with strict return precautions        Disposition  Final diagnoses:   Right flank pain   Kidney stone   [facility]OMY (acute kidney injury) ([facility] )     Time reflects when diagnosis was documented in both MDM as applicable and the Disposition within this note     Time User Action Codes Description Comment    9/6/2022  6:01 AM Harman, [address] [place] [address] [R10 9] Right flank pain     9/6/2022  6:01 AM Royal Hammer Add [N20 0] Kidney stone     9/6/2022  6:04 AM Royal Hammer Add [N17 9] NAHOMY (acute kidney injury) [facility])       ED Disposition     ED Disposition   Discharge    Condition   Stable    Date/Time   Tue Sep 6, 2022  6:01 AM    Comment   [place] discharge to home/self care  Follow-up Information     Follow up With Specialties Details Why Contact Info Additional [address] For Urology [facility] Urology Call  For re-evaluation [address] [phone]  [address] [place] [address] Urology [facility], [address] [place] [address], [address] [place] [address]          Patient's Medications   Discharge Prescriptions    IBUPROFEN (MOTRIN) 400 MG TABLET    Take 1 tablet (400 mg total) by mouth every 6 (six) hours as needed for mild pain       Start Date: 9/6/2022  End Date: --       Order Dose: 400 mg       Quantity: 30 tablet    Refills: 0       No discharge procedures on file      PDMP Review       Value Time User    PDMP Reviewed  Yes 11/27/2019 11:46 AM SCOTT Dickinson          ED Provider  Electronically Signed by           Elvin Beuno MD  09/06/22 8710

## 2022-11-24 ENCOUNTER — HOSPITAL ENCOUNTER (EMERGENCY)
Facility: HOSPITAL | Age: 47
Discharge: HOME | End: 2022-11-24
Attending: EMERGENCY MEDICINE

## 2022-11-24 ENCOUNTER — APPOINTMENT (EMERGENCY)
Dept: CT IMAGING | Facility: HOSPITAL | Age: 47
End: 2022-11-24

## 2022-11-24 VITALS
DIASTOLIC BLOOD PRESSURE: 94 MMHG | OXYGEN SATURATION: 95 % | SYSTOLIC BLOOD PRESSURE: 140 MMHG | RESPIRATION RATE: 16 BRPM | HEART RATE: 102 BPM | TEMPERATURE: 97.9 F

## 2022-11-24 DIAGNOSIS — R55 NEAR SYNCOPE: Primary | ICD-10-CM

## 2022-11-24 LAB
2HR DELTA HS TROPONIN: 1 NG/L
ANION GAP SERPL CALCULATED.3IONS-SCNC: 8 MMOL/L (ref 4–13)
BASOPHILS # BLD AUTO: 0.04 THOUSANDS/ÂΜL (ref 0–0.1)
BASOPHILS NFR BLD AUTO: 1 % (ref 0–1)
BUN SERPL-MCNC: 24 MG/DL (ref 5–25)
CALCIUM SERPL-MCNC: 8.6 MG/DL (ref 8.3–10.1)
CARDIAC TROPONIN I PNL SERPL HS: 3 NG/L
CARDIAC TROPONIN I PNL SERPL HS: 4 NG/L
CHLORIDE SERPL-SCNC: 102 MMOL/L (ref 96–108)
CO2 SERPL-SCNC: 25 MMOL/L (ref 21–32)
CREAT SERPL-MCNC: 1.05 MG/DL (ref 0.6–1.3)
EOSINOPHIL # BLD AUTO: 0.05 THOUSAND/ÂΜL (ref 0–0.61)
EOSINOPHIL NFR BLD AUTO: 1 % (ref 0–6)
ERYTHROCYTE [DISTWIDTH] IN BLOOD BY AUTOMATED COUNT: 12.7 % (ref 11.6–15.1)
FLUAV RNA RESP QL NAA+PROBE: NEGATIVE
FLUBV RNA RESP QL NAA+PROBE: NEGATIVE
GFR SERPL CREATININE-BSD FRML MDRD: 83 ML/MIN/1.73SQ M
GLUCOSE SERPL-MCNC: 166 MG/DL (ref 65–140)
HCT VFR BLD AUTO: 47.9 % (ref 36.5–49.3)
HGB BLD-MCNC: 15.5 G/DL (ref 12–17)
IMM GRANULOCYTES # BLD AUTO: 0.06 THOUSAND/UL (ref 0–0.2)
IMM GRANULOCYTES NFR BLD AUTO: 1 % (ref 0–2)
INR PPP: 0.98 (ref 0.84–1.19)
LYMPHOCYTES # BLD AUTO: 1.79 THOUSANDS/ÂΜL (ref 0.6–4.47)
LYMPHOCYTES NFR BLD AUTO: 24 % (ref 14–44)
MAGNESIUM SERPL-MCNC: 1.9 MG/DL (ref 1.6–2.6)
MCH RBC QN AUTO: 31.1 PG (ref 26.8–34.3)
MCHC RBC AUTO-ENTMCNC: 32.4 G/DL (ref 31.4–37.4)
MCV RBC AUTO: 96 FL (ref 82–98)
MONOCYTES # BLD AUTO: 0.45 THOUSAND/ÂΜL (ref 0.17–1.22)
MONOCYTES NFR BLD AUTO: 6 % (ref 4–12)
NEUTROPHILS # BLD AUTO: 5.2 THOUSANDS/ÂΜL (ref 1.85–7.62)
NEUTS SEG NFR BLD AUTO: 67 % (ref 43–75)
NRBC BLD AUTO-RTO: 0 /100 WBCS
NT-PROBNP SERPL-MCNC: 10 PG/ML
PLATELET # BLD AUTO: 224 THOUSANDS/UL (ref 149–390)
PMV BLD AUTO: 9.3 FL (ref 8.9–12.7)
POTASSIUM SERPL-SCNC: 3.8 MMOL/L (ref 3.5–5.3)
PROTHROMBIN TIME: 13.2 SECONDS (ref 11.6–14.5)
RBC # BLD AUTO: 4.98 MILLION/UL (ref 3.88–5.62)
RSV RNA RESP QL NAA+PROBE: NEGATIVE
SARS-COV-2 RNA RESP QL NAA+PROBE: NEGATIVE
SODIUM SERPL-SCNC: 135 MMOL/L (ref 135–147)
T4 FREE SERPL-MCNC: 0.69 NG/DL (ref 0.76–1.46)
TSH SERPL DL<=0.05 MIU/L-ACNC: 2.4 UIU/ML (ref 0.45–4.5)
WBC # BLD AUTO: 7.59 THOUSAND/UL (ref 4.31–10.16)

## 2022-11-24 RX ORDER — SODIUM CHLORIDE 9 MG/ML
3 INJECTION INTRAVENOUS
Status: DISCONTINUED | OUTPATIENT
Start: 2022-11-24 | End: 2022-11-24 | Stop reason: HOSPADM

## 2022-11-24 RX ADMIN — IOHEXOL 85 ML: 350 INJECTION, SOLUTION INTRAVENOUS at 00:37

## 2022-11-24 NOTE — ED NOTES
Pt remains stable stroke alert not called pt acuity moved up to a 3        Tacey BARBARA Philip  11/24/22 0656

## 2023-07-24 ENCOUNTER — HOSPITAL ENCOUNTER (EMERGENCY)
Facility: HOSPITAL | Age: 48
Discharge: HOME | End: 2023-07-24
Attending: EMERGENCY MEDICINE
Payer: COMMERCIAL

## 2023-07-24 ENCOUNTER — APPOINTMENT (EMERGENCY)
Dept: CT IMAGING | Facility: HOSPITAL | Age: 48
End: 2023-07-24
Payer: COMMERCIAL

## 2023-07-24 VITALS
DIASTOLIC BLOOD PRESSURE: 95 MMHG | BODY MASS INDEX: 30.41 KG/M2 | HEART RATE: 80 BPM | RESPIRATION RATE: 18 BRPM | SYSTOLIC BLOOD PRESSURE: 144 MMHG | OXYGEN SATURATION: 95 % | WEIGHT: 218.03 LBS | TEMPERATURE: 97.8 F

## 2023-07-24 DIAGNOSIS — N13.30 HYDROURETERONEPHROSIS: ICD-10-CM

## 2023-07-24 DIAGNOSIS — R10.9 ACUTE RIGHT FLANK PAIN: Primary | ICD-10-CM

## 2023-07-24 LAB
ANION GAP SERPL CALCULATED.3IONS-SCNC: 7 MMOL/L
BACTERIA UR QL AUTO: ABNORMAL /HPF
BASOPHILS # BLD AUTO: 0.05 THOUSANDS/ÂΜL (ref 0–0.1)
BASOPHILS NFR BLD AUTO: 0 % (ref 0–1)
BILIRUB UR QL STRIP: NEGATIVE
BUN SERPL-MCNC: 21 MG/DL (ref 5–25)
CALCIUM SERPL-MCNC: 9.1 MG/DL (ref 8.4–10.2)
CHLORIDE SERPL-SCNC: 110 MMOL/L (ref 96–108)
CLARITY UR: CLEAR
CO2 SERPL-SCNC: 18 MMOL/L (ref 21–32)
COLOR UR: YELLOW
CREAT SERPL-MCNC: 1.63 MG/DL (ref 0.6–1.3)
EOSINOPHIL # BLD AUTO: 0.07 THOUSAND/ÂΜL (ref 0–0.61)
EOSINOPHIL NFR BLD AUTO: 1 % (ref 0–6)
ERYTHROCYTE [DISTWIDTH] IN BLOOD BY AUTOMATED COUNT: 13.2 % (ref 11.6–15.1)
GFR SERPL CREATININE-BSD FRML MDRD: 48 ML/MIN/1.73SQ M
GLUCOSE SERPL-MCNC: 138 MG/DL (ref 65–140)
GLUCOSE UR STRIP-MCNC: NEGATIVE MG/DL
HCT VFR BLD AUTO: 46.1 % (ref 36.5–49.3)
HGB BLD-MCNC: 15.2 G/DL (ref 12–17)
HGB UR QL STRIP.AUTO: ABNORMAL
IMM GRANULOCYTES # BLD AUTO: 0.06 THOUSAND/UL (ref 0–0.2)
IMM GRANULOCYTES NFR BLD AUTO: 1 % (ref 0–2)
KETONES UR STRIP-MCNC: NEGATIVE MG/DL
LEUKOCYTE ESTERASE UR QL STRIP: NEGATIVE
LYMPHOCYTES # BLD AUTO: 1.38 THOUSANDS/ÂΜL (ref 0.6–4.47)
LYMPHOCYTES NFR BLD AUTO: 11 % (ref 14–44)
MCH RBC QN AUTO: 31.1 PG (ref 26.8–34.3)
MCHC RBC AUTO-ENTMCNC: 33 G/DL (ref 31.4–37.4)
MCV RBC AUTO: 95 FL (ref 82–98)
MONOCYTES # BLD AUTO: 0.91 THOUSAND/ÂΜL (ref 0.17–1.22)
MONOCYTES NFR BLD AUTO: 7 % (ref 4–12)
NEUTROPHILS # BLD AUTO: 10.24 THOUSANDS/ÂΜL (ref 1.85–7.62)
NEUTS SEG NFR BLD AUTO: 80 % (ref 43–75)
NITRITE UR QL STRIP: NEGATIVE
NON-SQ EPI CELLS URNS QL MICRO: ABNORMAL /HPF
NRBC BLD AUTO-RTO: 0 /100 WBCS
PH UR STRIP.AUTO: 5 [PH]
PLATELET # BLD AUTO: 209 THOUSANDS/UL (ref 149–390)
PMV BLD AUTO: 9.6 FL (ref 8.9–12.7)
POTASSIUM SERPL-SCNC: 4 MMOL/L (ref 3.5–5.3)
PROT UR STRIP-MCNC: ABNORMAL MG/DL
RBC # BLD AUTO: 4.88 MILLION/UL (ref 3.88–5.62)
RBC #/AREA URNS AUTO: ABNORMAL /HPF
SODIUM SERPL-SCNC: 135 MMOL/L (ref 135–147)
SP GR UR STRIP.AUTO: >=1.03 (ref 1–1.03)
UROBILINOGEN UR QL STRIP.AUTO: 0.2 E.U./DL
WBC # BLD AUTO: 12.71 THOUSAND/UL (ref 4.31–10.16)
WBC #/AREA URNS AUTO: ABNORMAL /HPF

## 2023-07-24 PROCEDURE — 85025 COMPLETE CBC W/AUTO DIFF WBC: CPT | Performed by: EMERGENCY MEDICINE

## 2023-07-24 PROCEDURE — 74177 CT ABD & PELVIS W/CONTRAST: CPT

## 2023-07-24 PROCEDURE — 81001 URINALYSIS AUTO W/SCOPE: CPT | Performed by: EMERGENCY MEDICINE

## 2023-07-24 PROCEDURE — 36415 COLL VENOUS BLD VENIPUNCTURE: CPT | Performed by: EMERGENCY MEDICINE

## 2023-07-24 PROCEDURE — G1004 CDSM NDSC: HCPCS

## 2023-07-24 PROCEDURE — 80048 BASIC METABOLIC PNL TOTAL CA: CPT | Performed by: EMERGENCY MEDICINE

## 2023-07-24 RX ORDER — KETOROLAC TROMETHAMINE 30 MG/ML
15 INJECTION, SOLUTION INTRAMUSCULAR; INTRAVENOUS ONCE
Status: COMPLETED | OUTPATIENT
Start: 2023-07-24 | End: 2023-07-24

## 2023-07-24 RX ADMIN — IOHEXOL 100 ML: 350 INJECTION, SOLUTION INTRAVENOUS at 01:43

## 2023-07-24 RX ADMIN — SODIUM CHLORIDE 1000 ML: 0.9 INJECTION, SOLUTION INTRAVENOUS at 01:10

## 2023-07-24 RX ADMIN — KETOROLAC TROMETHAMINE 15 MG: 30 INJECTION, SOLUTION INTRAMUSCULAR; INTRAVENOUS at 01:10

## 2023-07-24 NOTE — ED PROVIDER NOTES
History  Chief Complaint   Patient presents with   • Flank Pain     Right sided flank pain x1 hour. Denies N+V. History of kidney stones in the past     Rolando Banda is a 51y.o. year old male with PMH of HTN presenting to the [facility] ED for right flank pain. Patient has had one hour of right sided flank/abdominal pain. Patient in normal state of health prior to symptom onset. Pain reported as severe in intensity, nonradiating and without a/e factors. Patient denies associated fevers/chills, N/V. No dysuria or hematuria. No pain in the penis/scrotum/testicle. The patient has not taken/received any medications at home for relief of symptoms. Patient denies history of prior abdominal surgeries. Patient reports history of possible kidney stone previously. History provided by:  Medical records and patient   used: No        Prior to Admission Medications   Prescriptions Last Dose Informant Patient Reported? Taking?    Butalbital-APAP-Caffeine (Fioricet) -40 MG CAPS   No No   Sig: Take 1 tablet by mouth 2 (two) times a day as needed (headache)   Patient not taking: Reported on 3/8/2021   albuterol (PROVENTIL HFA,VENTOLIN HFA) 90 mcg/act inhaler   No No   Sig: Inhale 2 puffs every 6 (six) hours as needed for wheezing   Patient not taking: Reported on 3/22/2021   bisoprolol (ZEBETA) 5 mg tablet   No No   Sig: Take 1 tablet (5 mg total) by mouth daily   ibuprofen (MOTRIN) 400 mg tablet   No No   Sig: Take 1 tablet (400 mg total) by mouth every 6 (six) hours as needed for mild pain   lisinopril (ZESTRIL) 40 mg tablet   No No   Sig: Take 1 tablet (40 mg total) by mouth daily   nicotine (NICODERM CQ) 21 mg/24 hr TD 24 hr patch   No No   Sig: Place 1 patch on the skin daily   Patient not taking: Reported on 3/8/2021   pantoprazole (PROTONIX) 40 mg tablet   No No   Sig: Take 1 tablet (40 mg total) by mouth 2 (two) times a day   valACYclovir (VALTREX) 1,000 mg tablet   No No   Sig: Take 1 tablet (1,000 mg total) by mouth 3 (three) times a day for 7 days      Facility-Administered Medications: None       Past Medical History:   Diagnosis Date   • Biceps tendon tear    • Carpal tunnel syndrome    • Chronic pain     back   • Hypertension    • Lyme disease        Past Surgical History:   Procedure Laterality Date   • CARPAL TUNNEL RELEASE     • ROTATOR CUFF REPAIR     • ULNAR NERVE REPAIR         Family History   Problem Relation Age of Onset   • Multiple sclerosis Mother    • No Known Problems Father      I have reviewed and agree with the history as documented. E-Cigarette/Vaping   • E-Cigarette Use Never User      E-Cigarette/Vaping Substances     Social History     Tobacco Use   • Smoking status: Every Day     Packs/day: 1.00     Years: 0.00     Total pack years: 0.00     Types: Cigarettes   • Smokeless tobacco: Never   Vaping Use   • Vaping Use: Never used   Substance Use Topics   • Alcohol use: Not Currently   • Drug use: Never       Review of Systems   Constitutional: Negative for chills and fever. Respiratory: Negative for shortness of breath. Cardiovascular: Negative for chest pain. Gastrointestinal: Positive for abdominal pain. Negative for diarrhea, nausea and vomiting. Genitourinary: Positive for flank pain. Negative for dysuria, hematuria, penile pain, penile swelling and scrotal swelling. Neurological: Negative for weakness. All other systems reviewed and are negative. Physical Exam  Physical Exam  Vitals and nursing note reviewed. Constitutional:       General: He is not in acute distress. Appearance: He is well-developed. He is ill-appearing (appears uncomfortable). He is not toxic-appearing or diaphoretic. HENT:      Head: Normocephalic and atraumatic. Nose: No congestion or rhinorrhea. Eyes:      General:         Right eye: No discharge. Left eye: No discharge. Cardiovascular:      Rate and Rhythm: Normal rate and regular rhythm.    Pulmonary: Effort: Pulmonary effort is normal. No respiratory distress. Breath sounds: Normal breath sounds. No wheezing or rales. Abdominal:      Palpations: Abdomen is soft. Tenderness: There is no abdominal tenderness. There is right CVA tenderness. There is no left CVA tenderness, guarding or rebound. Negative signs include Rovsing's sign, psoas sign and obturator sign. Musculoskeletal:      Cervical back: Normal range of motion. No rigidity. Skin:     General: Skin is warm. Capillary Refill: Capillary refill takes less than 2 seconds. Neurological:      Mental Status: He is alert and oriented to person, place, and time.    Psychiatric:         Mood and Affect: Mood normal.         Behavior: Behavior normal.         Vital Signs  ED Triage Vitals [07/24/23 0042]   Temperature Pulse Respirations Blood Pressure SpO2   97.8 °F (36.6 °C) 81 18 148/93 98 %      Temp Source Heart Rate Source Patient Position - Orthostatic VS BP Location FiO2 (%)   Temporal Monitor Lying Left arm --      Pain Score       7           Vitals:    07/24/23 0205 07/24/23 0235 07/24/23 0305 07/24/23 0335   BP: 134/91 135/87 141/91 144/95   Pulse: 86 88 83 80   Patient Position - Orthostatic VS:             Visual Acuity      ED Medications  Medications   sodium chloride 0.9 % bolus 1,000 mL (0 mL Intravenous Stopped 7/24/23 0350)   ketorolac (TORADOL) injection 15 mg (15 mg Intravenous Given 7/24/23 0110)   iohexol (OMNIPAQUE) 350 MG/ML injection (SINGLE-DOSE) 100 mL (100 mL Intravenous Given 7/24/23 0143)       Diagnostic Studies  Results Reviewed     Procedure Component Value Units Date/Time    Urine Microscopic [[id number]]  (Abnormal) Collected: 07/24/23 0216    Lab Status: Final result Specimen: Urine, Other Updated: 07/24/23 0244     RBC, UA 4-10 /hpf      WBC, UA 0-1 /hpf      Epithelial Cells None Seen /hpf      Bacteria, UA Occasional /hpf     UA w Reflex to Microscopic w Reflex to Culture [[id number]]  (Abnormal) Collected: 07/24/23 0216    Lab Status: Final result Specimen: Urine, Other Updated: 07/24/23 0224     Color, UA Yellow     Clarity, UA Clear     Specific Gravity, UA >=1.030     pH, UA 5.0     Leukocytes, UA Negative     Nitrite, UA Negative     Protein, UA Trace mg/dl      Glucose, UA Negative mg/dl      Ketones, UA Negative mg/dl      Urobilinogen, UA 0.2 E.U./dl      Bilirubin, UA Negative     Occult Blood, UA Small    Basic metabolic panel [[id number]]  (Abnormal) Collected: 07/24/23 0100    Lab Status: Final result Specimen: Blood from Arm, Right Updated: 07/24/23 0126     Sodium 135 mmol/L      Potassium 4.0 mmol/L      Chloride 110 mmol/L      CO2 18 mmol/L      ANION GAP 7 mmol/L      BUN 21 mg/dL      Creatinine 1.63 mg/dL      Glucose 138 mg/dL      Calcium 9.1 mg/dL      eGFR 48 ml/min/1.73sq m     Narrative:      National Kidney Disease Foundation guidelines for Chronic Kidney Disease (CKD):   •  Stage 1 with normal or high GFR (GFR > 90 mL/min/1.73 square meters)  •  Stage 2 Mild CKD (GFR = 60-89 mL/min/1.73 square meters)  •  Stage 3A Moderate CKD (GFR = 45-59 mL/min/1.73 square meters)  •  Stage 3B Moderate CKD (GFR = 30-44 mL/min/1.73 square meters)  •  Stage 4 Severe CKD (GFR = 15-29 mL/min/1.73 square meters)  •  Stage 5 End Stage CKD (GFR <15 mL/min/1.73 square meters)  Note: GFR calculation is accurate only with a steady state creatinine    CBC and differential [[id number]]  (Abnormal) Collected: 07/24/23 0100    Lab Status: Final result Specimen: Blood from Arm, Right Updated: 07/24/23 0110     WBC 12.71 Thousand/uL      RBC 4.88 Million/uL      Hemoglobin 15.2 g/dL      Hematocrit 46.1 %      MCV 95 fL      MCH 31.1 pg      MCHC 33.0 g/dL      RDW 13.2 %      MPV 9.6 fL      Platelets 575 Thousands/uL      nRBC 0 /100 WBCs      Neutrophils Relative 80 %      Immat GRANS % 1 %      Lymphocytes Relative 11 %      Monocytes Relative 7 %      Eosinophils Relative 1 %      Basophils Relative 0 % Neutrophils Absolute 10.24 Thousands/µL      Immature Grans Absolute 0.06 Thousand/uL      Lymphocytes Absolute 1.38 Thousands/µL      Monocytes Absolute 0.91 Thousand/µL      Eosinophils Absolute 0.07 Thousand/µL      Basophils Absolute 0.05 Thousands/µL                  CT abdomen pelvis with contrast   Final Result by Ayaka Leblanc MD (07/24 2268)      Sequelae of right UPJ obstruction again noted, similar compared to the prior exam. Recommend nonemergent urology consultation. No evidence for bowel obstruction, inflammation, appendicitis, free air, or free fluid. Workstation performed: [id number]                    Procedures  Procedures         ED Course  ED Course as of 07/24/23 0418   Mon Jul 24, 2023   0147 Patient reassessed. Pain is improved though remains present. Reviewed lab results with the patient. Pending UA and CT for dispo.   [id number] Patient reassessed. Resting comfortably in bed, pain is improved. Reviewed CT results with patient, including findings of hyroureteronephrosis. Emphasized need for further evaluation by urology as outpatient. Will plan for discharge at this time. 4290 UA w Reflex to Microscopic w Reflex to Culture(!)                               SBIRT 20yo+    Flowsheet Row Most Recent Value   Initial Alcohol Screen: US AUDIT-C     1. How often do you have a drink containing alcohol? 0 Filed at: 07/24/2023 0044   Audit-C Score 0 Filed at: 07/24/2023 0044                    Medical Decision Making    51 y.o. male presenting for right flank/abdominal pain. VSS, appears uncomfortable on exam.  Will order labs, UA and CT to evaluate for UTI, pyelonephritis, kidney stone or appendicitis. Lower suspicion for ascending diverticulitis or AAA. Will treat symptomatically and reassess. Reassessment: pain much improved on repeat evaluation. Resting comfortably in bed. VSS. Reviewed lab, UA and CT results with patient.  Discussed CT findings similar to prior which may demonstrate recently passed ureteral stone. Discussed in detail recommendations for urology f/u to exclude underlying serious pathology. Disposition: I have discussed with the patient our plan to discharge them from the ED and the patient is in agreement with this plan. Discharge Plan: encouraged hydration, PRN tylenol/motrin if symptoms return. RTED precautions emphasized. The patient was provided a written after visit summary with strict RTED precautions. Followup: I have discussed with the patient plan to follow up with Urology. Contact information provided in AVS.    Amount and/or Complexity of Data Reviewed  Labs: ordered. Decision-making details documented in ED Course. Radiology: ordered. Risk  Prescription drug management. Disposition  Final diagnoses:   Acute right flank pain   Hydroureteronephrosis     Time reflects when diagnosis was documented in both MDM as applicable and the Disposition within this note     Time User Action Codes Description Comment    7/24/2023  3:33 AM Brianne Parcel Add [R10.9] Acute right flank pain     7/24/2023  3:34 AM Brianne Parcel Add [N13.30] Hydroureteronephrosis       ED Disposition     ED Disposition   Discharge    Condition   Stable    Date/Time   Mon Jul 24, 2023  3:33 AM    Comment   Russ Mireles discharge to home/self care. Follow-up Information     Follow up With Specialties Details Why Contact Info Additional [phone]  For Urology [facility] Urology Schedule an appointment as soon as possible for a visit  To establish care.  [address] [place] [address] [address] [facility] For Urology [facility], [address] [place] [address], [address] [place]          Discharge Medication List as of 7/24/2023  3:34 AM      CONTINUE these medications which have NOT CHANGED    Details   albuterol (PROVENTIL HFA,VENTOLIN HFA) 90 mcg/act inhaler Inhale 2 puffs every 6 (six) hours as needed for wheezing, Starting Tue 1/19/2021, Normal      bisoprolol (ZEBETA) 5 mg tablet Take 1 tablet (5 mg total) by mouth daily, Starting Tue 1/19/2021, Normal      Butalbital-APAP-Caffeine (Fioricet) -40 MG CAPS Take 1 tablet by mouth 2 (two) times a day as needed (headache), Starting Tue 1/19/2021, Normal      ibuprofen (MOTRIN) 400 mg tablet Take 1 tablet (400 mg total) by mouth every 6 (six) hours as needed for mild pain, Starting Tue 9/6/2022, Normal      lisinopril (ZESTRIL) 40 mg tablet Take 1 tablet (40 mg total) by mouth daily, Starting Wed 1/20/2021, Normal      nicotine (NICODERM CQ) 21 mg/24 hr TD 24 hr patch Place 1 patch on the skin daily, Starting Wed 1/20/2021, Normal      pantoprazole (PROTONIX) 40 mg tablet Take 1 tablet (40 mg total) by mouth 2 (two) times a day, Starting Tue 1/19/2021, Until Thu 2/18/2021, Normal      valACYclovir (VALTREX) 1,000 mg tablet Take 1 tablet (1,000 mg total) by mouth 3 (three) times a day for 7 days, Starting Mon 3/8/2021, Until Mon 3/15/2021, Normal             No discharge procedures on file.     PDMP Review       Value Time User    PDMP Reviewed  Yes 11/27/2019 11:46 AM SCOTT Gregory          ED Provider  Electronically Signed by           Roman Soriano DO  07/24/23 8187

## 2023-07-24 NOTE — DISCHARGE INSTRUCTIONS
You have been seen for right flank pain. Please trial tylenol and motrin if your symptoms return. Return to the emergency department if you develop worsening pain, vomiting, fevers or any other symptoms of concern. Please follow up with urology by calling the number provided.

## 2023-08-29 PROCEDURE — 99284 EMERGENCY DEPT VISIT MOD MDM: CPT | Performed by: EMERGENCY MEDICINE

## 2023-08-29 PROCEDURE — 99281 EMR DPT VST MAYX REQ PHY/QHP: CPT

## 2023-08-30 ENCOUNTER — HOSPITAL ENCOUNTER (EMERGENCY)
Facility: HOSPITAL | Age: 48
Discharge: HOME | End: 2023-08-30
Attending: EMERGENCY MEDICINE | Admitting: EMERGENCY MEDICINE
Payer: COMMERCIAL

## 2023-08-30 VITALS
OXYGEN SATURATION: 96 % | TEMPERATURE: 97.1 F | HEART RATE: 86 BPM | DIASTOLIC BLOOD PRESSURE: 96 MMHG | RESPIRATION RATE: 18 BRPM | SYSTOLIC BLOOD PRESSURE: 166 MMHG
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
## 2023-08-30 RX ORDER — CEPHALEXIN 250 MG/1
500 CAPSULE ORAL ONCE
Status: COMPLETED | OUTPATIENT
Start: 2023-08-30 | End: 2023-08-30

## 2023-08-30 RX ORDER — DIPHENHYDRAMINE HCL 25 MG
50 TABLET ORAL ONCE
Status: COMPLETED | OUTPATIENT
Start: 2023-08-30 | End: 2023-08-30

## 2023-08-30 RX ORDER — CEPHALEXIN 500 MG/1
500 CAPSULE ORAL EVERY 6 HOURS SCHEDULED
Qty: 28 CAPSULE | Refills: 0 | Status: SHIPPED | OUTPATIENT
Start: 2023-08-30 | End: 2023-09-06

## 2023-08-30 RX ADMIN — DIPHENHYDRAMINE HYDROCHLORIDE 50 MG: 25 TABLET ORAL at 01:07

## 2023-08-30 RX ADMIN — CEPHALEXIN 500 MG: 250 CAPSULE ORAL at 01:07

## 2023-08-30 NOTE — DISCHARGE INSTRUCTIONS
You have been seen for a bite and rash to the right posterior leg. Please complete the course of antibiotics as prescribed. Please take benadryl and motrin as needed for discomfort. Please complete the course of antibiotics as prescribed. Return to the emergency department if you develop worsening rash, swelling, fevers, weakness/numbness in the right leg or any other symptoms of concern. Please follow up with a PCP by calling the number provided.

## 2023-08-30 NOTE — ED PROVIDER NOTES
History  Chief Complaint   Patient presents with   • Insect Bite     Patient states he was bit by unknown bug to right leg about five hours ago. Patient states he was in the woods at the time. Redness noted to posterior thigh. Angely Rao is a 51y.o. year old male with PMH of HTN presenting to the [facility] ED for evaluation of a right leg rash. Patient was walking through the woods four hours prior to arrival when he felt a sudden discomfort in the back of his right upper leg. Since that time he had redness in the area and a burning sensation. No weakness/numbness in the right lower extremity. Patient believes he was bit by something in the right leg. The patient does not believe it was a tick or snake bite. No fevers/chills. Patient denies any other complaints. Patient has not taken/received any medications at home for relief of symptoms. History provided by:  Medical records and patient   used: No    Insect Bite  Associated symptoms: no fever and no numbness        Prior to Admission Medications   Prescriptions Last Dose Informant Patient Reported? Taking?    Butalbital-APAP-Caffeine (Fioricet) -40 MG CAPS   No No   Sig: Take 1 tablet by mouth 2 (two) times a day as needed (headache)   Patient not taking: Reported on 3/8/2021   albuterol (PROVENTIL HFA,VENTOLIN HFA) 90 mcg/act inhaler   No No   Sig: Inhale 2 puffs every 6 (six) hours as needed for wheezing   Patient not taking: Reported on 3/22/2021   bisoprolol (ZEBETA) 5 mg tablet   No No   Sig: Take 1 tablet (5 mg total) by mouth daily   ibuprofen (MOTRIN) 400 mg tablet   No No   Sig: Take 1 tablet (400 mg total) by mouth every 6 (six) hours as needed for mild pain   lisinopril (ZESTRIL) 40 mg tablet   No No   Sig: Take 1 tablet (40 mg total) by mouth daily   nicotine (NICODERM CQ) 21 mg/24 hr TD 24 hr patch   No No   Sig: Place 1 patch on the skin daily   Patient not taking: Reported on 3/8/2021   pantoprazole (PROTONIX) 40 mg tablet   No No   Sig: Take 1 tablet (40 mg total) by mouth 2 (two) times a day   valACYclovir (VALTREX) 1,000 mg tablet   No No   Sig: Take 1 tablet (1,000 mg total) by mouth 3 (three) times a day for 7 days      Facility-Administered Medications: None       Past Medical History:   Diagnosis Date   • Biceps tendon tear    • Carpal tunnel syndrome    • Chronic pain     back   • Hypertension    • Lyme disease        Past Surgical History:   Procedure Laterality Date   • CARPAL TUNNEL RELEASE     • ROTATOR CUFF REPAIR     • ULNAR NERVE REPAIR         Family History   Problem Relation Age of Onset   • Multiple sclerosis Mother    • No Known Problems Father      I have reviewed and agree with the history as documented. E-Cigarette/Vaping   • E-Cigarette Use Never User      E-Cigarette/Vaping Substances     Social History     Tobacco Use   • Smoking status: Every Day     Packs/day: 1.00     Years: 0.00     Total pack years: 0.00     Types: Cigarettes   • Smokeless tobacco: Never   Vaping Use   • Vaping Use: Never used   Substance Use Topics   • Alcohol use: Not Currently   • Drug use: Never       Review of Systems   Constitutional: Negative for chills and fever. Respiratory: Negative for shortness of breath. Cardiovascular: Negative for chest pain. Gastrointestinal: Negative for abdominal pain. Musculoskeletal: Positive for myalgias. Negative for gait problem. Skin: Positive for wound. Neurological: Negative for weakness and numbness. All other systems reviewed and are negative. Physical Exam  Physical Exam  Vitals and nursing note reviewed. Constitutional:       General: He is not in acute distress. Appearance: Normal appearance. He is well-developed. He is not ill-appearing, toxic-appearing or diaphoretic. HENT:      Head: Normocephalic and atraumatic. Nose: No congestion or rhinorrhea. Eyes:      General:         Right eye: No discharge.          Left eye: No discharge. Cardiovascular:      Rate and Rhythm: Normal rate and regular rhythm. Pulmonary:      Effort: Pulmonary effort is normal. No respiratory distress. Breath sounds: Normal breath sounds. No wheezing or rales. Abdominal:      Palpations: Abdomen is soft. Tenderness: There is no abdominal tenderness. There is no guarding or rebound. Musculoskeletal:      Cervical back: Normal range of motion. No rigidity. Right upper leg: Edema present. No swelling, lacerations or tenderness. Right knee: No swelling, effusion, erythema or bony tenderness. Normal range of motion. No tenderness. Right lower leg: No edema. Left lower leg: No edema. Skin:     General: Skin is warm. Capillary Refill: Capillary refill takes less than 2 seconds. Findings: Rash present. Comments: Erythematous patch along the posterior distal hamstring region. No fluctuance or crepitus. Neurological:      Mental Status: He is alert and oriented to person, place, and time. GCS: GCS eye subscore is 4. GCS verbal subscore is 5. GCS motor subscore is 6. Comments: 5/5 strength b/l LE. Sensation grossly intact throughout.    Psychiatric:         Behavior: Behavior normal.             Vital Signs  ED Triage Vitals [08/30/23 0031]   Temperature Pulse Respirations Blood Pressure SpO2   (!) 97.1 °F (36.2 °C) 86 18 166/96 96 %      Temp Source Heart Rate Source Patient Position - Orthostatic VS BP Location FiO2 (%)   Temporal Monitor Sitting Left arm --      Pain Score       2           Vitals:    08/30/23 0031   BP: 166/96   Pulse: 86   Patient Position - Orthostatic VS: Sitting         Visual Acuity      ED Medications  Medications   diphenhydrAMINE (BENADRYL) tablet 50 mg (has no administration in time range)   cephalexin (KEFLEX) capsule 500 mg (has no administration in time range)       Diagnostic Studies  Results Reviewed     None                 No orders to display Procedures  Procedures         ED Course                                             Medical Decision Making    51 y.o. male presenting for evaluation of a rash on the right posterior upper leg. BP elevated, otherwise vital stable. Patient nontoxic-appearing and amatory in the emergency department. Erythema noted on exam.  No visible bite marks. No clear purulent drainage. No crepitus. I do not suspect abscess or NSTI. There are no vesicles and rash does not appear consistent with contact dermatitis. Symptoms either allergic or consistent with cellulitis. We will treat with Benadryl and cephalexin. I have discussed with the patient our plan to discharge them from the ED and the patient is in agreement with this plan. The patient was provided a written after visit summary with strict RTED precautions. Discharge Plan: Prescription for Keflex. As needed Benadryl and ice. Followup: I have discussed with the patient plan to follow up with their PCP. Contact information provided in AVS.    Risk  OTC drugs. Prescription drug management. Disposition  Final diagnoses:   Bug bite with infection, initial encounter     Time reflects when diagnosis was documented in both MDM as applicable and the Disposition within this note     Time User Action Codes Description Comment    8/30/2023 12:51 AM Antwon Fuller. XXXA] Bug bite with infection, initial encounter       ED Disposition     ED Disposition   Discharge    Condition   Stable    Date/Time   Wed Aug 30, 2023 12:51 AM    Comment   Benjiman Meckel discharge to home/self care. Follow-up Information     Follow up With Specialties Details Why Contact Info Additional Information    [address] [facility] Schedule an appointment as soon as possible for a visit  For reevaluation if symptoms do not resolve.  [address] [facility] [address] [facility], [address], [address]          Patient's Medications   Discharge Prescriptions    CEPHALEXIN (KEFLEX) 500 MG CAPSULE    Take 1 capsule (500 mg total) by mouth every 6 (six) hours for 7 days       Start Date: 8/30/2023 End Date: 9/6/2023       Order Dose: 500 mg       Quantity: 28 capsule    Refills: 0       No discharge procedures on file.     PDMP Review       Value Time User    PDMP Reviewed  Yes 11/27/2019 11:46 AM Wynn Kocher, CRNP          ED Provider  Electronically Signed by           Jerson Hernandez DO  08/30/23 8094

## 2023-10-17 ENCOUNTER — OFFICE VISIT (OUTPATIENT)
Dept: FAMILY MEDICINE CLINIC | Facility: CLINIC | Age: 48
End: 2023-10-17
Payer: COMMERCIAL

## 2023-10-17 VITALS
HEART RATE: 78 BPM | WEIGHT: 203.4 LBS | SYSTOLIC BLOOD PRESSURE: 132 MMHG | HEIGHT: 71 IN | OXYGEN SATURATION: 98 % | DIASTOLIC BLOOD PRESSURE: 78 MMHG | BODY MASS INDEX: 28.48 KG/M2

## 2023-10-17 DIAGNOSIS — Z12.5 SCREENING FOR PROSTATE CANCER: ICD-10-CM

## 2023-10-17 DIAGNOSIS — Z00.00 ANNUAL PHYSICAL EXAM: Primary | ICD-10-CM

## 2023-10-17 DIAGNOSIS — Z72.0 TOBACCO ABUSE: ICD-10-CM

## 2023-10-17 DIAGNOSIS — Z11.1 SCREENING FOR TUBERCULOSIS: ICD-10-CM

## 2023-10-17 DIAGNOSIS — Z13.220 SCREENING FOR HYPERLIPIDEMIA: ICD-10-CM

## 2023-10-17 DIAGNOSIS — N52.9 ERECTILE DYSFUNCTION, UNSPECIFIED ERECTILE DYSFUNCTION TYPE: ICD-10-CM

## 2023-10-17 PROBLEM — R11.2 NAUSEA AND VOMITING: Status: RESOLVED | Noted: 2021-01-16 | Resolved: 2023-10-17

## 2023-10-17 PROBLEM — I10 ESSENTIAL HYPERTENSION: Status: RESOLVED | Noted: 2017-12-05 | Resolved: 2023-10-17

## 2023-10-17 PROBLEM — F19.10 SUBSTANCE ABUSE (HCC): Status: RESOLVED | Noted: 2019-11-26 | Resolved: 2023-10-17

## 2023-10-17 PROBLEM — R79.89 ELEVATED LIVER FUNCTION TESTS: Status: RESOLVED | Noted: 2021-01-16 | Resolved: 2023-10-17

## 2023-10-17 PROBLEM — F19.10 SUBSTANCE ABUSE, CONTINUOUS (HCC): Status: RESOLVED | Noted: 2019-11-27 | Resolved: 2023-10-17

## 2023-10-17 PROBLEM — F11.93 OPIATE WITHDRAWAL (HCC): Status: RESOLVED | Noted: 2019-11-25 | Resolved: 2023-10-17

## 2023-10-17 PROBLEM — F39 MOOD DISORDER (HCC): Status: RESOLVED | Noted: 2019-11-26 | Resolved: 2023-10-17

## 2023-10-17 PROBLEM — R65.10 SIRS (SYSTEMIC INFLAMMATORY RESPONSE SYNDROME) (HCC): Status: RESOLVED | Noted: 2021-01-16 | Resolved: 2023-10-17

## 2023-10-17 PROBLEM — Z00.8 MEDICAL CLEARANCE FOR PSYCHIATRIC ADMISSION: Status: RESOLVED | Noted: 2019-11-26 | Resolved: 2023-10-17

## 2023-10-17 PROBLEM — F11.20 OPIATE DEPENDENCE, CONTINUOUS (HCC): Status: RESOLVED | Noted: 2017-12-05 | Resolved: 2023-10-17

## 2023-10-17 PROBLEM — F10.10 ALCOHOL ABUSE: Status: RESOLVED | Noted: 2021-01-16 | Resolved: 2023-10-17

## 2023-10-17 PROBLEM — G44.89 OTHER HEADACHE SYNDROME: Status: RESOLVED | Noted: 2021-01-19 | Resolved: 2023-10-17

## 2023-10-17 PROCEDURE — 99396 PREV VISIT EST AGE 40-64: CPT | Performed by: PHYSICIAN ASSISTANT

## 2023-10-17 PROCEDURE — 86580 TB INTRADERMAL TEST: CPT | Performed by: PHYSICIAN ASSISTANT

## 2023-10-17 RX ORDER — SILDENAFIL 100 MG/1
100 TABLET, FILM COATED ORAL DAILY PRN
Qty: 10 TABLET | Refills: 2 | Status: SHIPPED | OUTPATIENT
Start: 2023-10-17

## 2023-10-17 RX ORDER — SILDENAFIL 50 MG/1
100 TABLET, FILM COATED ORAL DAILY PRN
Qty: 10 TABLET | Refills: 2 | Status: SHIPPED | OUTPATIENT
Start: 2023-10-17 | End: 2023-10-17 | Stop reason: CLARIF

## 2023-10-17 NOTE — PATIENT INSTRUCTIONS
Wellness Visit for Adults   AMBULATORY CARE:   A wellness visit  is when you see your healthcare provider to get screened for health problems. Your healthcare provider will also give you advice on how to stay healthy. Write down your questions so you remember to ask them. Ask your healthcare provider how often you should have a wellness visit. What happens at a wellness visit:  Your healthcare provider will ask about your health, and your family history of health problems. This includes high blood pressure, heart disease, and cancer. He or she will ask if you have symptoms that concern you, if you smoke, and about your mood. You may also be asked about your intake of medicines, supplements, food, and alcohol. Any of the following may be done: Your weight  will be checked. Your height may also be checked so your body mass index (BMI) can be calculated. Your BMI shows if you are at a healthy weight. Your blood pressure  and heart rate will be checked. Your temperature may also be checked. Blood and urine tests  may be done. Blood tests may be done to check your cholesterol levels. Abnormal cholesterol levels increase your risk for heart disease and stroke. You may also need a blood or urine test to check for diabetes if you are at increased risk. Urine tests may be done to look for signs of an infection or kidney disease. A physical exam  includes checking your heartbeat and lungs with a stethoscope. Your healthcare provider may also check your skin to look for sun damage. Screening tests  may be recommended. A screening test is done to check for diseases that may not cause symptoms. The screening tests you may need depend on your age, gender, family history, and lifestyle habits. For example, colorectal screening may be recommended if you are 48years old or older. Screening tests you need if you are a woman:   A Pap smear  is used to screen for cervical cancer.  Pap smears are usually done every 3 to 5 years depending on your age. You may need them more often if you have had abnormal Pap smear test results in the past. Ask your healthcare provider how often you should have a Pap smear. A mammogram  is an x-ray of your breasts to screen for breast cancer. Experts recommend mammograms every 2 years starting at age 48 years. You may need a mammogram at age 52 years or younger if you have an increased risk for breast cancer. Talk to your healthcare provider about when you should start having mammograms and how often you need them. Vaccines you may need:   Get an influenza vaccine  every year. The influenza vaccine protects you from the flu. Several types of viruses cause the flu. The viruses change over time, so new vaccines are made each year. Get a tetanus-diphtheria (Td) booster vaccine  every 10 years. This vaccine protects you against tetanus and diphtheria. Tetanus is a severe infection that may cause painful muscle spasms and lockjaw. Diphtheria is a severe bacterial infection that causes a thick covering in the back of your mouth and throat. Get a human papillomavirus (HPV) vaccine  if you are female and aged 23 to 32 or male 23 to 24 and never received it. This vaccine protects you from HPV infection. HPV is the most common infection spread by sexual contact. HPV may also cause vaginal, penile, and anal cancers. Get a pneumococcal vaccine  if you are aged 72 years or older. The pneumococcal vaccine is an injection given to protect you from pneumococcal disease. Pneumococcal disease is an infection caused by pneumococcal bacteria. The infection may cause pneumonia, meningitis, or an ear infection. Get a shingles vaccine  if you are 60 or older, even if you have had shingles before. The shingles vaccine is an injection to protect you from the varicella-zoster virus. This is the same virus that causes chickenpox.  Shingles is a painful rash that develops in people who had chickenpox or have been exposed to the virus. How to eat healthy:  My Plate is a model for planning healthy meals. It shows the types and amounts of foods that should go on your plate. Fruits and vegetables make up about half of your plate, and grains and protein make up the other half. A serving of dairy is included on the side of your plate. The amount of calories and serving sizes you need depends on your age, gender, weight, and height. Examples of healthy foods are listed below:  Eat a variety of vegetables  such as dark green, red, and orange vegetables. You can also include canned vegetables low in sodium (salt) and frozen vegetables without added butter or sauces. Eat a variety of fresh fruits , canned fruit in 100% juice, frozen fruit, and dried fruit. Include whole grains. At least half of the grains you eat should be whole grains. Examples include whole-wheat bread, wheat pasta, brown rice, and whole-grain cereals such as oatmeal.    Eat a variety of protein foods such as seafood (fish and shellfish), lean meat, and poultry without skin (turkey and chicken). Examples of lean meats include pork leg, shoulder, or tenderloin, and beef round, sirloin, tenderloin, and extra lean ground beef. Other protein foods include eggs and egg substitutes, beans, peas, soy products, nuts, and seeds. Choose low-fat dairy products such as skim or 1% milk or low-fat yogurt, cheese, and cottage cheese. Limit unhealthy fats  such as butter, hard margarine, and shortening. Exercise:  Exercise at least 30 minutes per day on most days of the week. Some examples of exercise include walking, biking, dancing, and swimming. You can also fit in more physical activity by taking the stairs instead of the elevator or parking farther away from stores. Include muscle strengthening activities 2 days each week. Regular exercise provides many health benefits.  It helps you manage your weight, and decreases your risk for type 2 diabetes, heart disease, stroke, and high blood pressure. Exercise can also help improve your mood. Ask your healthcare provider about the best exercise plan for you. General health and safety guidelines:   Do not smoke. Nicotine and other chemicals in cigarettes and cigars can cause lung damage. Ask your healthcare provider for information if you currently smoke and need help to quit. E-cigarettes or smokeless tobacco still contain nicotine. Talk to your healthcare provider before you use these products. Limit alcohol. A drink of alcohol is 12 ounces of beer, 5 ounces of wine, or 1½ ounces of liquor. Lose weight, if needed. Being overweight increases your risk of certain health conditions. These include heart disease, high blood pressure, type 2 diabetes, and certain types of cancer. Protect your skin. Do not sunbathe or use tanning beds. Use sunscreen with a SPF 15 or higher. Apply sunscreen at least 15 minutes before you go outside. Reapply sunscreen every 2 hours. Wear protective clothing, hats, and sunglasses when you are outside. Drive safely. Always wear your seatbelt. Make sure everyone in your car wears a seatbelt. A seatbelt can save your life if you are in an accident. Do not use your cell phone when you are driving. This could distract you and cause an accident. Pull over if you need to make a call or send a text message. Practice safe sex. Use latex condoms if are sexually active and have more than one partner. Your healthcare provider may recommend screening tests for sexually transmitted infections (STIs). Wear helmets, lifejackets, and protective gear. Always wear a helmet when you ride a bike or motorcycle, go skiing, or play sports that could cause a head injury. Wear protective equipment when you play sports. Wear a lifejacket when you are on a boat or doing water sports.     © Copyright [employer] 2023 Information is for End User's use only and may not be sold, redistributed or otherwise used for commercial purposes. The above information is an  only. It is not intended as medical advice for individual conditions or treatments. Talk to your doctor, nurse or pharmacist before following any medical regimen to see if it is safe and effective for you.

## 2023-10-17 NOTE — PROGRESS NOTES
[facility]    NAME: Robbie Cooper  AGE: 51 y.o. SEX: male  : 1975     DATE: 10/17/2023     Assessment and Plan:     Problem List Items Addressed This Visit        Other    Tobacco abuse     Encourage patient to quit smoking. He is not interested at this time. Annual physical exam - Primary     Routine labs ordered. Encouraged patient to get screened for colon cancer, but he is not interested at this time. Refused flu shot. Relevant Orders    CBC and differential    Comprehensive metabolic panel    Lipid panel    PSA, Total Screen   Other Visit Diagnoses     Screening for tuberculosis        Relevant Orders    TB Skin Test (Completed)    Erectile dysfunction, unspecified erectile dysfunction type        Relevant Medications    sildenafil (VIAGRA) 100 mg tablet    BMI 28.0-28.9,adult        Screening for prostate cancer        Relevant Orders    PSA, Total Screen    Screening for hyperlipidemia        Relevant Orders    Comprehensive metabolic panel    Lipid panel          Immunizations and preventive care screenings were discussed with patient today. Appropriate education was printed on patient's after visit summary. Counseling:  Alcohol/drug use: discussed moderation in alcohol intake, the recommendations for healthy alcohol use, and avoidance of illicit drug use. Dental Health: discussed importance of regular tooth brushing, flossing, and dental visits. Injury prevention: discussed safety/seat belts, safety helmets, smoke detectors, carbon dioxide detectors, and smoking near bedding or upholstery. Sexual health: discussed sexually transmitted diseases, partner selection, use of condoms, avoidance of unintended pregnancy, and contraceptive alternatives. Exercise: the importance of regular exercise/physical activity was discussed. Recommend exercise 3-5 times per week for at least 30 minutes.      BMI Counseling: Body mass index is 28.37 kg/m². The BMI is above normal. Nutrition recommendations include decreasing portion sizes, consuming healthier snacks, moderation in carbohydrate intake and reducing intake of saturated and trans fat. Exercise recommendations include exercising 3-5 times per week. Rationale for BMI follow-up plan is due to patient being overweight or obese. Depression Screening and Follow-up Plan: Patient was screened for depression during today's encounter. They screened negative with a PHQ-2 score of 0. Tobacco Cessation Counseling: Tobacco cessation counseling was provided. The patient is sincerely urged to quit consumption of tobacco. He is not ready to quit tobacco.         Return in 1 year (on 10/17/2024) for Annual physical.     Chief Complaint:     Chief Complaint   Patient presents with   • Annual Exam     Physical exam and PPD for work       History of Present Illness:     Adult Annual Physical   Patient here for a comprehensive physical exam. The patient reports no problems. He quit drinking and using drugs. He admits that he is feeling excellent. He is due for routine labs. He has not needed any medications since he stopped drinking. He refuses a flu shot today. He needs a TB test for a new job. He is also having some issues with erectile dysfunction. He is asking if he can have a prescription for Viagra. He continues to smoke cigarettes. He is not interested in quitting at this time. He does have a family history of colon cancer in his uncle. He is not interested in a colonoscopy at this time. He denies any other concerns. Diet and Physical Activity  Diet/Nutrition: well balanced diet. Exercise: walking.       Depression Screening  PHQ-2/9 Depression Screening    Little interest or pleasure in doing things: 0 - not at all  Feeling down, depressed, or hopeless: 0 - not at all  PHQ-2 Score: 0  PHQ-2 Interpretation: Negative depression screen       General Health  Sleep: sleeps well. Hearing: decreased - bilateral.-Patient is not interested in a hearing evaluation at this time  Vision: most recent eye exam >1 year ago and wears glasses. Dental: no dental visits for >1 year.  Health  Symptoms include: erectile dysfunction     Review of Systems:     Review of Systems   Constitutional:  Negative for chills, diaphoresis, fatigue and fever. HENT:  Negative for congestion, ear pain, postnasal drip, rhinorrhea, sneezing, sore throat and trouble swallowing. Eyes:  Negative for pain and visual disturbance. Respiratory:  Negative for apnea, cough, shortness of breath and wheezing. Cardiovascular:  Negative for chest pain and palpitations. Gastrointestinal:  Negative for abdominal pain, constipation, diarrhea, nausea and vomiting. Genitourinary:  Negative for dysuria and hematuria. Musculoskeletal:  Negative for arthralgias, gait problem and myalgias. Neurological:  Negative for dizziness, syncope, weakness, light-headedness, numbness and headaches. Psychiatric/Behavioral:  Negative for suicidal ideas. The patient is not nervous/anxious.        Past Medical History:     Past Medical History:   Diagnosis Date   • Biceps tendon tear    • Carpal tunnel syndrome    • Chronic pain     back   • Hypertension    • Lyme disease    • Opiate dependence, continuous ([address]) 12/05/2017      Past Surgical History:     Past Surgical History:   Procedure Laterality Date   • CARPAL TUNNEL RELEASE     • ROTATOR CUFF REPAIR     • ULNAR NERVE REPAIR        Family History:     Family History   Problem Relation Age of Onset   • Multiple sclerosis Mother    • No Known Problems Father       Social History:     Social History     Socioeconomic History   • Marital status: /Civil Union     Spouse name: None   • Number of children: None   • Years of education: None   • Highest education level: None   Occupational History   • None   Tobacco Use   • Smoking status: Every Day     Packs/day: 1.00     Years: 0.00     Total pack years: 0.00     Types: Cigarettes   • Smokeless tobacco: Never   Vaping Use   • Vaping Use: Never used   Substance and Sexual Activity   • Alcohol use: Not Currently   • Drug use: Never   • Sexual activity: Yes     Partners: Female   Other Topics Concern   • None   Social History Narrative    ** Merged History Encounter **          Social Determinants of Health     Financial Resource Strain: Not on file   Food Insecurity: Not on file   Transportation Needs: Not on file   Physical Activity: Not on file   Stress: Not on file   Social Connections: Not on file   Intimate Partner Violence: Not on file   Housing Stability: Not on file      Current Medications:     Current Outpatient Medications   Medication Sig Dispense Refill   • sildenafil (VIAGRA) 100 mg tablet Take 1 tablet (100 mg total) by mouth daily as needed for erectile dysfunction 10 tablet 2     No current facility-administered medications for this visit. Allergies:     No Known Allergies   Physical Exam:     /78   Pulse 78   Ht 5' 11" (1.803 m)   Wt 92.3 kg (203 lb 6.4 oz)   SpO2 98%   BMI 28.37 kg/m²     Physical Exam  Vitals and nursing note reviewed. Constitutional:       General: He is not in acute distress. Appearance: He is well-developed. HENT:      Head: Normocephalic and atraumatic. Right Ear: Tympanic membrane, ear canal and external ear normal. There is no impacted cerumen. Left Ear: Tympanic membrane, ear canal and external ear normal. There is no impacted cerumen. Nose: Nose normal. No congestion or rhinorrhea. Mouth/Throat:      Mouth: Mucous membranes are moist.      Pharynx: No oropharyngeal exudate or posterior oropharyngeal erythema. Eyes:      Extraocular Movements: Extraocular movements intact. Conjunctiva/sclera: Conjunctivae normal.   Cardiovascular:      Rate and Rhythm: Normal rate and regular rhythm.       Heart sounds: No murmur heard.  Pulmonary:      Effort: Pulmonary effort is normal. No respiratory distress. Breath sounds: Normal breath sounds. No wheezing, rhonchi or rales. Abdominal:      General: Abdomen is flat. Palpations: Abdomen is soft. Tenderness: There is no abdominal tenderness. There is no guarding or rebound. Musculoskeletal:         General: No swelling. Normal range of motion. Cervical back: Normal range of motion and neck supple. Skin:     General: Skin is warm and dry. Capillary Refill: Capillary refill takes less than 2 seconds. Neurological:      General: No focal deficit present. Mental Status: He is alert and oriented to person, place, and time. Psychiatric:         Mood and Affect: Mood normal.         Behavior: Behavior normal.         Thought Content:  Thought content normal.         Judgment: Judgment normal.          Martha Green PA-C  [facility]

## 2023-10-17 NOTE — ASSESSMENT & PLAN NOTE
Routine labs ordered. Encouraged patient to get screened for colon cancer, but he is not interested at this time. Refused flu shot.

## 2023-11-27 DIAGNOSIS — N52.9 ERECTILE DYSFUNCTION, UNSPECIFIED ERECTILE DYSFUNCTION TYPE: ICD-10-CM

## 2023-11-27 RX ORDER — SILDENAFIL 100 MG/1
TABLET, FILM COATED ORAL
Qty: 10 TABLET | Refills: 0 | Status: SHIPPED | OUTPATIENT
Start: 2023-11-27

## 2023-12-28 DIAGNOSIS — N52.9 ERECTILE DYSFUNCTION, UNSPECIFIED ERECTILE DYSFUNCTION TYPE: ICD-10-CM

## 2023-12-28 RX ORDER — SILDENAFIL 100 MG/1
TABLET, FILM COATED ORAL
Qty: 10 TABLET | Refills: 0 | Status: SHIPPED | OUTPATIENT
Start: 2023-12-28

## 2024-07-03 ENCOUNTER — TELEPHONE (OUTPATIENT)
Dept: FAMILY MEDICINE CLINIC | Facility: CLINIC | Age: 49
End: 2024-07-03

## 2025-08-06 ENCOUNTER — TELEPHONE (OUTPATIENT)
Dept: FAMILY MEDICINE CLINIC | Facility: CLINIC | Age: 50
End: 2025-08-06

## 2025-08-18 ENCOUNTER — OFFICE VISIT (OUTPATIENT)
Dept: URGENT CARE | Facility: MEDICAL CENTER | Age: 50
End: 2025-08-18
Payer: COMMERCIAL

## 2025-08-18 VITALS
DIASTOLIC BLOOD PRESSURE: 96 MMHG | TEMPERATURE: 98 F | BODY MASS INDEX: 31.64 KG/M2 | OXYGEN SATURATION: 95 % | HEART RATE: 68 BPM | SYSTOLIC BLOOD PRESSURE: 142 MMHG | RESPIRATION RATE: 18 BRPM | HEIGHT: 70 IN | WEIGHT: 221 LBS

## 2025-08-18 DIAGNOSIS — H69.93 ETD (EUSTACHIAN TUBE DYSFUNCTION), BILATERAL: Primary | ICD-10-CM

## 2025-08-18 PROCEDURE — 99213 OFFICE O/P EST LOW 20 MIN: CPT | Performed by: PHYSICIAN ASSISTANT

## 2025-08-18 RX ORDER — METHYLPREDNISOLONE 4 MG/1
TABLET ORAL
Qty: 1 EACH | Refills: 0 | Status: SHIPPED | OUTPATIENT
Start: 2025-08-18

## 2025-08-18 RX ORDER — FLUTICASONE PROPIONATE 50 MCG
1 SPRAY, SUSPENSION (ML) NASAL DAILY
Qty: 18.2 ML | Refills: 0 | Status: SHIPPED | OUTPATIENT
Start: 2025-08-18